# Patient Record
Sex: FEMALE | Race: WHITE | Employment: OTHER | URBAN - METROPOLITAN AREA
[De-identification: names, ages, dates, MRNs, and addresses within clinical notes are randomized per-mention and may not be internally consistent; named-entity substitution may affect disease eponyms.]

---

## 2017-02-20 PROBLEM — G47.33 OSA ON CPAP: Status: ACTIVE | Noted: 2017-02-20

## 2017-02-20 PROBLEM — Z99.89 OSA ON CPAP: Status: ACTIVE | Noted: 2017-02-20

## 2017-06-13 ENCOUNTER — HOSPITAL ENCOUNTER (OUTPATIENT)
Dept: LAB | Age: 67
Discharge: HOME | End: 2017-06-13
Attending: FAMILY MEDICINE | Admitting: FAMILY MEDICINE

## 2017-06-13 LAB
ALBUMIN SERPL-MCNC: 4.3 GM/DL (ref 3.4–5)
ALP BLD-CCNC: 100 IU/L (ref 40–129)
ALT SERPL-CCNC: 17 U/L (ref 10–40)
ANION GAP SERPL CALCULATED.3IONS-SCNC: 14 MMOL/L (ref 4–16)
AST SERPL-CCNC: 12 IU/L (ref 15–37)
BASOPHILS ABSOLUTE: 0 K/CU MM
BASOPHILS RELATIVE PERCENT: 0.4 % (ref 0–1)
BILIRUB SERPL-MCNC: 0.3 MG/DL (ref 0–1)
BUN BLDV-MCNC: 13 MG/DL (ref 6–23)
CALCIUM SERPL-MCNC: 9.6 MG/DL (ref 8.3–10.6)
CHLORIDE BLD-SCNC: 99 MMOL/L (ref 99–110)
CO2: 26 MMOL/L (ref 21–32)
CREAT SERPL-MCNC: 0.6 MG/DL (ref 0.6–1.1)
DIFFERENTIAL TYPE: ABNORMAL
EOSINOPHILS ABSOLUTE: 0.2 K/CU MM
EOSINOPHILS RELATIVE PERCENT: 2.9 % (ref 0–3)
GFR AFRICAN AMERICAN: >60 ML/MIN/1.73M2
GFR NON-AFRICAN AMERICAN: >60 ML/MIN/1.73M2
GLUCOSE FASTING: 124 MG/DL (ref 70–99)
HCT VFR BLD CALC: 39.9 % (ref 37–47)
HEMOGLOBIN: 12.6 GM/DL (ref 12.5–16)
IMMATURE NEUTROPHIL %: 0.3 % (ref 0–0.43)
LYMPHOCYTES ABSOLUTE: 2 K/CU MM
LYMPHOCYTES RELATIVE PERCENT: 27.9 % (ref 24–44)
MCH RBC QN AUTO: 29.4 PG (ref 27–31)
MCHC RBC AUTO-ENTMCNC: 31.6 % (ref 32–36)
MCV RBC AUTO: 93 FL (ref 78–100)
MONOCYTES ABSOLUTE: 0.5 K/CU MM
MONOCYTES RELATIVE PERCENT: 6.3 % (ref 0–4)
NUCLEATED RBC %: 0 %
PDW BLD-RTO: 12.7 % (ref 11.7–14.9)
PLATELET # BLD: 214 K/CU MM (ref 140–440)
PMV BLD AUTO: 9.3 FL (ref 7.5–11.1)
POTASSIUM SERPL-SCNC: 4.6 MMOL/L (ref 3.5–5.1)
RBC # BLD: 4.29 M/CU MM (ref 4.2–5.4)
SEGMENTED NEUTROPHILS ABSOLUTE COUNT: 4.4 K/CU MM
SEGMENTED NEUTROPHILS RELATIVE PERCENT: 62.2 % (ref 36–66)
SODIUM BLD-SCNC: 139 MMOL/L (ref 135–145)
T3 FREE: 2.8 PG/ML (ref 2.3–4.2)
T4 FREE: 0.86 NG/DL (ref 0.9–1.8)
TOTAL IMMATURE NEUTOROPHIL: 0.02 K/CU MM
TOTAL NUCLEATED RBC: 0 K/CU MM
TOTAL PROTEIN: 6.9 GM/DL (ref 6.4–8.2)
TSH HIGH SENSITIVITY: 1.71 UIU/ML (ref 0.27–4.2)
WBC # BLD: 7.1 K/CU MM (ref 4–10.5)

## 2017-08-11 ENCOUNTER — HOSPITAL ENCOUNTER (OUTPATIENT)
Dept: LAB | Age: 67
Discharge: HOME | End: 2017-08-11
Attending: FAMILY MEDICINE | Admitting: FAMILY MEDICINE

## 2017-08-11 LAB
T4 FREE: 0.92 NG/DL (ref 0.9–1.8)
TSH HIGH SENSITIVITY: 1.48 UIU/ML (ref 0.27–4.2)

## 2017-10-12 ENCOUNTER — HOSPITAL ENCOUNTER (OUTPATIENT)
Dept: LAB | Age: 67
Discharge: HOME | End: 2017-10-12
Attending: FAMILY MEDICINE | Admitting: FAMILY MEDICINE

## 2017-10-12 LAB
ALBUMIN SERPL-MCNC: 4.5 GM/DL (ref 3.4–5)
ALP BLD-CCNC: 107 IU/L (ref 40–128)
ALT SERPL-CCNC: 14 U/L (ref 10–40)
ANION GAP SERPL CALCULATED.3IONS-SCNC: 16 MMOL/L (ref 4–16)
AST SERPL-CCNC: 14 IU/L (ref 15–37)
BACTERIA: ABNORMAL /HPF
BILIRUB SERPL-MCNC: 0.3 MG/DL (ref 0–1)
BILIRUBIN URINE: NEGATIVE MG/DL
BLOOD, URINE: NEGATIVE
BUN BLDV-MCNC: 11 MG/DL (ref 6–23)
CALCIUM SERPL-MCNC: 9.6 MG/DL (ref 8.3–10.6)
CHLORIDE BLD-SCNC: 97 MMOL/L (ref 99–110)
CHOLESTEROL: 197 MG/DL
CLARITY: ABNORMAL
CO2: 26 MMOL/L (ref 21–32)
COLOR: YELLOW
CREAT SERPL-MCNC: 0.6 MG/DL (ref 0.6–1.1)
ESTIMATED AVERAGE GLUCOSE: 120 MG/DL
GFR AFRICAN AMERICAN: >60 ML/MIN/1.73M2
GFR NON-AFRICAN AMERICAN: >60 ML/MIN/1.73M2
GLUCOSE FASTING: 129 MG/DL (ref 70–99)
GLUCOSE, URINE: NEGATIVE MG/DL
HBA1C MFR BLD: 5.8 % (ref 4.2–6.3)
HDLC SERPL-MCNC: 51 MG/DL
KETONES, URINE: NEGATIVE MG/DL
LDL CHOLESTEROL DIRECT: 119 MG/DL
LEUKOCYTE ESTERASE, URINE: NEGATIVE
MUCUS: ABNORMAL HPF
NITRITE URINE, QUANTITATIVE: NEGATIVE
PH, URINE: 7 (ref 5–8)
POTASSIUM SERPL-SCNC: 4.3 MMOL/L (ref 3.5–5.1)
PROTEIN UA: NEGATIVE MG/DL
RBC URINE: 1 /HPF (ref 0–6)
SODIUM BLD-SCNC: 139 MMOL/L (ref 135–145)
SPECIFIC GRAVITY UA: 1.01 (ref 1–1.03)
SQUAMOUS EPITHELIAL: 26 /HPF
TOTAL PROTEIN: 7 GM/DL (ref 6.4–8.2)
TRICHOMONAS: ABNORMAL /HPF
TRIGL SERPL-MCNC: 235 MG/DL
UROBILINOGEN, URINE: NORMAL MG/DL (ref 0.2–1)
WBC UA: 1 /HPF (ref 0–5)

## 2018-01-15 ENCOUNTER — HOSPITAL ENCOUNTER (OUTPATIENT)
Dept: LAB | Age: 68
Discharge: HOME | End: 2018-01-15
Attending: FAMILY MEDICINE | Admitting: FAMILY MEDICINE

## 2018-01-15 LAB
CHOLESTEROL: 188 MG/DL
ESTIMATED AVERAGE GLUCOSE: 123 MG/DL
HBA1C MFR BLD: 5.9 % (ref 4.2–6.3)
HDLC SERPL-MCNC: 49 MG/DL
HEPATITIS C ANTIBODY: NON REACTIVE
LDL CHOLESTEROL DIRECT: 107 MG/DL
TRIGL SERPL-MCNC: 252 MG/DL

## 2018-01-23 ENCOUNTER — HOSPITAL ENCOUNTER (OUTPATIENT)
Dept: WOMENS IMAGING | Age: 68
Discharge: HOME | End: 2018-02-21
Attending: FAMILY MEDICINE | Admitting: FAMILY MEDICINE

## 2018-02-07 ENCOUNTER — HOSPITAL ENCOUNTER (OUTPATIENT)
Dept: WOMENS IMAGING | Age: 68
Discharge: HOME | End: 2018-03-08
Attending: FAMILY MEDICINE | Admitting: FAMILY MEDICINE

## 2018-02-15 ENCOUNTER — HOSPITAL ENCOUNTER (OUTPATIENT)
Dept: WOMENS IMAGING | Age: 68
Discharge: HOME | End: 2018-02-15
Attending: FAMILY MEDICINE | Admitting: FAMILY MEDICINE

## 2018-02-15 DIAGNOSIS — Z12.31 VISIT FOR SCREENING MAMMOGRAM: ICD-10-CM

## 2018-07-02 ENCOUNTER — HOSPITAL ENCOUNTER (OUTPATIENT)
Dept: LAB | Age: 68
Discharge: HOME | End: 2018-07-02
Attending: FAMILY MEDICINE | Admitting: FAMILY MEDICINE

## 2018-07-02 LAB
ALBUMIN SERPL-MCNC: 4.4 GM/DL (ref 3.4–5)
ALP BLD-CCNC: 99 IU/L (ref 40–128)
ALT SERPL-CCNC: 21 U/L (ref 10–40)
ANION GAP SERPL CALCULATED.3IONS-SCNC: 20 MMOL/L (ref 4–16)
AST SERPL-CCNC: 23 IU/L (ref 15–37)
BACTERIA: ABNORMAL /HPF
BASOPHILS ABSOLUTE: 0 K/CU MM
BASOPHILS RELATIVE PERCENT: 0.4 % (ref 0–1)
BILIRUB SERPL-MCNC: 0.3 MG/DL (ref 0–1)
BILIRUBIN URINE: NEGATIVE MG/DL
BLOOD, URINE: NEGATIVE
BUN BLDV-MCNC: 11 MG/DL (ref 6–23)
CALCIUM SERPL-MCNC: 9.4 MG/DL (ref 8.3–10.6)
CHLORIDE BLD-SCNC: 97 MMOL/L (ref 99–110)
CHOLESTEROL: 186 MG/DL
CLARITY: ABNORMAL
CO2: 22 MMOL/L (ref 21–32)
COLOR: YELLOW
CREAT SERPL-MCNC: 0.6 MG/DL (ref 0.6–1.1)
DIFFERENTIAL TYPE: ABNORMAL
EOSINOPHILS ABSOLUTE: 0.2 K/CU MM
EOSINOPHILS RELATIVE PERCENT: 3 % (ref 0–3)
ESTIMATED AVERAGE GLUCOSE: 148 MG/DL
GFR AFRICAN AMERICAN: >60 ML/MIN/1.73M2
GFR NON-AFRICAN AMERICAN: >60 ML/MIN/1.73M2
GLUCOSE BLD-MCNC: 125 MG/DL (ref 70–99)
GLUCOSE, URINE: NEGATIVE MG/DL
HBA1C MFR BLD: 6.8 % (ref 4.2–6.3)
HCT VFR BLD CALC: 42 % (ref 37–47)
HDLC SERPL-MCNC: 57 MG/DL
HEMOGLOBIN: 12.7 GM/DL (ref 12.5–16)
IMMATURE NEUTROPHIL %: 0.4 % (ref 0–0.43)
KETONES, URINE: NEGATIVE MG/DL
LDL CHOLESTEROL DIRECT: 126 MG/DL
LEUKOCYTE ESTERASE, URINE: ABNORMAL
LYMPHOCYTES ABSOLUTE: 2.1 K/CU MM
LYMPHOCYTES RELATIVE PERCENT: 26.8 % (ref 24–44)
MCH RBC QN AUTO: 28.2 PG (ref 27–31)
MCHC RBC AUTO-ENTMCNC: 30.2 % (ref 32–36)
MCV RBC AUTO: 93.1 FL (ref 78–100)
MONOCYTES ABSOLUTE: 0.5 K/CU MM
MONOCYTES RELATIVE PERCENT: 6 % (ref 0–4)
MUCUS: ABNORMAL HPF
NITRITE URINE, QUANTITATIVE: NEGATIVE
NUCLEATED RBC %: 0 %
PDW BLD-RTO: 13.5 % (ref 11.7–14.9)
PH, URINE: 6 (ref 5–8)
PLATELET # BLD: 240 K/CU MM (ref 140–440)
PMV BLD AUTO: 8.8 FL (ref 7.5–11.1)
POTASSIUM SERPL-SCNC: 3.9 MMOL/L (ref 3.5–5.1)
PROTEIN UA: NEGATIVE MG/DL
RBC # BLD: 4.51 M/CU MM (ref 4.2–5.4)
RBC URINE: <1 /HPF (ref 0–6)
SEGMENTED NEUTROPHILS ABSOLUTE COUNT: 4.9 K/CU MM
SEGMENTED NEUTROPHILS RELATIVE PERCENT: 63.4 % (ref 36–66)
SODIUM BLD-SCNC: 139 MMOL/L (ref 135–145)
SPECIFIC GRAVITY UA: 1.01 (ref 1–1.03)
SQUAMOUS EPITHELIAL: 6 /HPF
TOTAL IMMATURE NEUTOROPHIL: 0.03 K/CU MM
TOTAL NUCLEATED RBC: 0 K/CU MM
TOTAL PROTEIN: 6.9 GM/DL (ref 6.4–8.2)
TRANSITIONAL EPITHELIAL: <1 /HPF
TRICHOMONAS: ABNORMAL /HPF
TRIGL SERPL-MCNC: 218 MG/DL
UROBILINOGEN, URINE: NORMAL MG/DL (ref 0.2–1)
WBC # BLD: 7.8 K/CU MM (ref 4–10.5)
WBC UA: 9 /HPF (ref 0–5)

## 2018-09-23 ENCOUNTER — HOSPITAL ENCOUNTER (OUTPATIENT)
Age: 68
Discharge: HOME | End: 2018-09-23
Payer: COMMERCIAL

## 2018-09-23 LAB
ESTIMATED AVERAGE GLUCOSE: 128 MG/DL
HBA1C MFR BLD: 6.1 % (ref 4.2–6.3)

## 2018-09-23 PROCEDURE — 83036 HEMOGLOBIN GLYCOSYLATED A1C: CPT

## 2018-09-23 PROCEDURE — 36415 COLL VENOUS BLD VENIPUNCTURE: CPT

## 2019-01-16 ENCOUNTER — HOSPITAL ENCOUNTER (OUTPATIENT)
Age: 69
Discharge: HOME | End: 2019-01-16
Payer: COMMERCIAL

## 2019-01-16 LAB
ANION GAP SERPL CALCULATED.3IONS-SCNC: 13 MMOL/L (ref 4–16)
BUN BLDV-MCNC: 11 MG/DL (ref 6–23)
CALCIUM SERPL-MCNC: 9.4 MG/DL (ref 8.3–10.6)
CHLORIDE BLD-SCNC: 100 MMOL/L (ref 99–110)
CO2: 27 MMOL/L (ref 21–32)
CREAT SERPL-MCNC: 0.6 MG/DL (ref 0.6–1.1)
ESTIMATED AVERAGE GLUCOSE: 128 MG/DL
GFR AFRICAN AMERICAN: >60 ML/MIN/1.73M2
GFR NON-AFRICAN AMERICAN: >60 ML/MIN/1.73M2
GLUCOSE BLD-MCNC: 132 MG/DL (ref 70–99)
HBA1C MFR BLD: 6.1 % (ref 4.2–6.3)
POTASSIUM SERPL-SCNC: 4.6 MMOL/L (ref 3.5–5.1)
SODIUM BLD-SCNC: 140 MMOL/L (ref 135–145)

## 2019-01-16 PROCEDURE — 80048 BASIC METABOLIC PNL TOTAL CA: CPT

## 2019-01-16 PROCEDURE — 36415 COLL VENOUS BLD VENIPUNCTURE: CPT

## 2019-01-16 PROCEDURE — 83036 HEMOGLOBIN GLYCOSYLATED A1C: CPT

## 2019-01-30 ENCOUNTER — OFFICE VISIT (OUTPATIENT)
Dept: INTERNAL MEDICINE CLINIC | Age: 69
End: 2019-01-30
Payer: COMMERCIAL

## 2019-01-30 VITALS
WEIGHT: 234.6 LBS | BODY MASS INDEX: 46.06 KG/M2 | DIASTOLIC BLOOD PRESSURE: 64 MMHG | HEART RATE: 86 BPM | OXYGEN SATURATION: 98 % | HEIGHT: 60 IN | SYSTOLIC BLOOD PRESSURE: 116 MMHG

## 2019-01-30 DIAGNOSIS — J01.90 ACUTE NON-RECURRENT SINUSITIS, UNSPECIFIED LOCATION: Primary | ICD-10-CM

## 2019-01-30 DIAGNOSIS — F32.A DEPRESSION, UNSPECIFIED DEPRESSION TYPE: ICD-10-CM

## 2019-01-30 DIAGNOSIS — R73.03 PREDIABETES: ICD-10-CM

## 2019-01-30 DIAGNOSIS — E78.5 HYPERLIPIDEMIA, UNSPECIFIED HYPERLIPIDEMIA TYPE: ICD-10-CM

## 2019-01-30 PROCEDURE — 99213 OFFICE O/P EST LOW 20 MIN: CPT | Performed by: FAMILY MEDICINE

## 2019-01-30 RX ORDER — AMOXICILLIN AND CLAVULANATE POTASSIUM 875; 125 MG/1; MG/1
1 TABLET, FILM COATED ORAL 2 TIMES DAILY
Qty: 20 TABLET | Refills: 0 | Status: SHIPPED | OUTPATIENT
Start: 2019-01-30 | End: 2019-02-09

## 2019-01-30 RX ORDER — LOSARTAN POTASSIUM AND HYDROCHLOROTHIAZIDE 12.5; 5 MG/1; MG/1
TABLET ORAL
COMMUNITY
Start: 2018-12-30

## 2019-01-30 RX ORDER — ESCITALOPRAM OXALATE 10 MG/1
10 TABLET ORAL DAILY
Qty: 90 TABLET | Refills: 1 | Status: SHIPPED | OUTPATIENT
Start: 2019-01-30 | End: 2019-05-29 | Stop reason: SDUPTHER

## 2019-01-30 ASSESSMENT — ENCOUNTER SYMPTOMS
RHINORRHEA: 1
ABDOMINAL PAIN: 0
SHORTNESS OF BREATH: 0
EYES NEGATIVE: 1
SINUS PAIN: 1
NAUSEA: 0
WHEEZING: 0

## 2019-01-30 ASSESSMENT — PATIENT HEALTH QUESTIONNAIRE - PHQ9
1. LITTLE INTEREST OR PLEASURE IN DOING THINGS: 0
SUM OF ALL RESPONSES TO PHQ QUESTIONS 1-9: 0
SUM OF ALL RESPONSES TO PHQ9 QUESTIONS 1 & 2: 0
2. FEELING DOWN, DEPRESSED OR HOPELESS: 0
SUM OF ALL RESPONSES TO PHQ QUESTIONS 1-9: 0

## 2019-03-14 ENCOUNTER — TELEPHONE (OUTPATIENT)
Dept: INTERNAL MEDICINE CLINIC | Age: 69
End: 2019-03-14

## 2019-04-04 RX ORDER — SIMVASTATIN 20 MG
20 TABLET ORAL NIGHTLY
Qty: 90 TABLET | Refills: 0 | Status: SHIPPED | OUTPATIENT
Start: 2019-04-04 | End: 2019-06-29 | Stop reason: SDUPTHER

## 2019-05-29 RX ORDER — ESCITALOPRAM OXALATE 10 MG/1
10 TABLET ORAL DAILY
Qty: 90 TABLET | Refills: 0 | Status: SHIPPED | OUTPATIENT
Start: 2019-05-29 | End: 2019-07-30 | Stop reason: SDUPTHER

## 2019-07-01 RX ORDER — SIMVASTATIN 20 MG
TABLET ORAL
Qty: 90 TABLET | Refills: 0 | Status: SHIPPED | OUTPATIENT
Start: 2019-07-01 | End: 2019-09-27 | Stop reason: SDUPTHER

## 2019-07-22 ENCOUNTER — HOSPITAL ENCOUNTER (OUTPATIENT)
Age: 69
Discharge: HOME | End: 2019-07-22
Payer: COMMERCIAL

## 2019-07-22 DIAGNOSIS — R73.03 PREDIABETES: ICD-10-CM

## 2019-07-22 DIAGNOSIS — E78.5 HYPERLIPIDEMIA, UNSPECIFIED HYPERLIPIDEMIA TYPE: ICD-10-CM

## 2019-07-22 LAB
ALBUMIN SERPL-MCNC: 4.6 GM/DL (ref 3.4–5)
ALP BLD-CCNC: 111 IU/L (ref 40–129)
ALT SERPL-CCNC: 18 U/L (ref 10–40)
AST SERPL-CCNC: 16 IU/L (ref 15–37)
BILIRUB SERPL-MCNC: 0.3 MG/DL (ref 0–1)
BILIRUBIN DIRECT: 0.2 MG/DL (ref 0–0.3)
BILIRUBIN, INDIRECT: 0.1 MG/DL (ref 0–0.7)
CHOLESTEROL: 214 MG/DL
ESTIMATED AVERAGE GLUCOSE: 134 MG/DL
HBA1C MFR BLD: 6.3 % (ref 4.2–6.3)
HDLC SERPL-MCNC: 53 MG/DL
LDL CHOLESTEROL DIRECT: 141 MG/DL
TOTAL PROTEIN: 7.2 GM/DL (ref 6.4–8.2)
TRIGL SERPL-MCNC: 215 MG/DL

## 2019-07-22 PROCEDURE — 83721 ASSAY OF BLOOD LIPOPROTEIN: CPT

## 2019-07-22 PROCEDURE — 80076 HEPATIC FUNCTION PANEL: CPT

## 2019-07-22 PROCEDURE — 80061 LIPID PANEL: CPT

## 2019-07-22 PROCEDURE — 36415 COLL VENOUS BLD VENIPUNCTURE: CPT

## 2019-07-22 PROCEDURE — 83036 HEMOGLOBIN GLYCOSYLATED A1C: CPT

## 2019-07-30 ENCOUNTER — OFFICE VISIT (OUTPATIENT)
Dept: INTERNAL MEDICINE CLINIC | Age: 69
End: 2019-07-30
Payer: COMMERCIAL

## 2019-07-30 VITALS
OXYGEN SATURATION: 98 % | HEIGHT: 60 IN | SYSTOLIC BLOOD PRESSURE: 110 MMHG | WEIGHT: 237.8 LBS | BODY MASS INDEX: 46.68 KG/M2 | DIASTOLIC BLOOD PRESSURE: 78 MMHG | HEART RATE: 64 BPM

## 2019-07-30 DIAGNOSIS — N95.0 PMB (POSTMENOPAUSAL BLEEDING): ICD-10-CM

## 2019-07-30 DIAGNOSIS — R73.03 PREDIABETES: Primary | ICD-10-CM

## 2019-07-30 DIAGNOSIS — E78.5 HYPERLIPIDEMIA, UNSPECIFIED HYPERLIPIDEMIA TYPE: ICD-10-CM

## 2019-07-30 DIAGNOSIS — F32.A DEPRESSION, UNSPECIFIED DEPRESSION TYPE: ICD-10-CM

## 2019-07-30 PROCEDURE — 99214 OFFICE O/P EST MOD 30 MIN: CPT | Performed by: FAMILY MEDICINE

## 2019-07-30 RX ORDER — ESCITALOPRAM OXALATE 10 MG/1
10 TABLET ORAL DAILY
Qty: 90 TABLET | Refills: 0 | Status: SHIPPED | OUTPATIENT
Start: 2019-07-30 | End: 2019-12-03 | Stop reason: SDUPTHER

## 2019-08-03 ASSESSMENT — ENCOUNTER SYMPTOMS
CONSTIPATION: 0
DIARRHEA: 0
CHEST TIGHTNESS: 0
COUGH: 0
BACK PAIN: 0
BLOOD IN STOOL: 0
NAUSEA: 0
SHORTNESS OF BREATH: 0
ABDOMINAL PAIN: 0

## 2019-09-27 RX ORDER — SIMVASTATIN 20 MG
TABLET ORAL
Qty: 90 TABLET | Refills: 0 | Status: SHIPPED | OUTPATIENT
Start: 2019-09-27 | End: 2019-12-03 | Stop reason: SDUPTHER

## 2019-10-25 ENCOUNTER — HOSPITAL ENCOUNTER (OUTPATIENT)
Age: 69
Discharge: HOME | End: 2019-10-25
Payer: MEDICARE

## 2019-10-25 ENCOUNTER — HOSPITAL ENCOUNTER (OUTPATIENT)
Dept: GENERAL RADIOLOGY | Age: 69
Discharge: HOME | End: 2019-10-25
Payer: MEDICARE

## 2019-10-25 DIAGNOSIS — J43.2 CENTRILOBULAR EMPHYSEMA (HCC): ICD-10-CM

## 2019-10-25 PROCEDURE — 71046 X-RAY EXAM CHEST 2 VIEWS: CPT

## 2019-11-15 ENCOUNTER — TELEPHONE (OUTPATIENT)
Dept: INTERNAL MEDICINE CLINIC | Age: 69
End: 2019-11-15

## 2019-11-29 ENCOUNTER — HOSPITAL ENCOUNTER (OUTPATIENT)
Age: 69
Discharge: HOME | End: 2019-11-29
Payer: MEDICARE

## 2019-11-29 LAB
ESTIMATED AVERAGE GLUCOSE: 137 MG/DL
HBA1C MFR BLD: 6.4 % (ref 4.2–6.3)

## 2019-11-29 PROCEDURE — 83036 HEMOGLOBIN GLYCOSYLATED A1C: CPT

## 2019-11-29 PROCEDURE — 36415 COLL VENOUS BLD VENIPUNCTURE: CPT

## 2019-12-03 ENCOUNTER — OFFICE VISIT (OUTPATIENT)
Dept: INTERNAL MEDICINE CLINIC | Age: 69
End: 2019-12-03
Payer: MEDICARE

## 2019-12-03 VITALS
HEIGHT: 60 IN | WEIGHT: 235.6 LBS | DIASTOLIC BLOOD PRESSURE: 84 MMHG | OXYGEN SATURATION: 98 % | SYSTOLIC BLOOD PRESSURE: 135 MMHG | TEMPERATURE: 97.6 F | RESPIRATION RATE: 20 BRPM | HEART RATE: 80 BPM | BODY MASS INDEX: 46.26 KG/M2

## 2019-12-03 DIAGNOSIS — R73.03 PREDIABETES: Primary | ICD-10-CM

## 2019-12-03 DIAGNOSIS — E78.5 HYPERLIPIDEMIA, UNSPECIFIED HYPERLIPIDEMIA TYPE: ICD-10-CM

## 2019-12-03 DIAGNOSIS — F32.A DEPRESSION, UNSPECIFIED DEPRESSION TYPE: ICD-10-CM

## 2019-12-03 DIAGNOSIS — J20.9 ACUTE BRONCHITIS, UNSPECIFIED ORGANISM: ICD-10-CM

## 2019-12-03 DIAGNOSIS — B96.89 ACUTE BACTERIAL SINUSITIS: ICD-10-CM

## 2019-12-03 DIAGNOSIS — J01.90 ACUTE BACTERIAL SINUSITIS: ICD-10-CM

## 2019-12-03 DIAGNOSIS — N95.0 PMB (POSTMENOPAUSAL BLEEDING): ICD-10-CM

## 2019-12-03 PROCEDURE — 99214 OFFICE O/P EST MOD 30 MIN: CPT | Performed by: FAMILY MEDICINE

## 2019-12-03 RX ORDER — SIMVASTATIN 20 MG
20 TABLET ORAL NIGHTLY
Qty: 90 TABLET | Refills: 0 | Status: SHIPPED | OUTPATIENT
Start: 2019-12-03 | End: 2020-02-28

## 2019-12-03 RX ORDER — PREDNISONE 10 MG/1
10 TABLET ORAL 2 TIMES DAILY
Qty: 10 TABLET | Refills: 0 | Status: SHIPPED | OUTPATIENT
Start: 2019-12-03 | End: 2019-12-08

## 2019-12-03 RX ORDER — CEFDINIR 300 MG/1
CAPSULE ORAL
Refills: 0 | COMMUNITY
Start: 2019-11-25 | End: 2020-03-10

## 2019-12-03 RX ORDER — ESCITALOPRAM OXALATE 10 MG/1
10 TABLET ORAL DAILY
Qty: 90 TABLET | Refills: 0 | Status: SHIPPED | OUTPATIENT
Start: 2019-12-03 | End: 2020-03-10 | Stop reason: SDUPTHER

## 2019-12-03 RX ORDER — FLUTICASONE PROPIONATE 50 MCG
SPRAY, SUSPENSION (ML) NASAL
Refills: 0 | COMMUNITY
Start: 2019-11-25 | End: 2022-07-14

## 2019-12-05 ENCOUNTER — HOSPITAL ENCOUNTER (OUTPATIENT)
Dept: ULTRASOUND IMAGING | Age: 69
Discharge: HOME | End: 2019-12-05
Payer: MEDICARE

## 2019-12-05 DIAGNOSIS — N95.0 PMB (POSTMENOPAUSAL BLEEDING): ICD-10-CM

## 2019-12-05 PROCEDURE — 76856 US EXAM PELVIC COMPLETE: CPT

## 2019-12-08 ASSESSMENT — ENCOUNTER SYMPTOMS
DIARRHEA: 0
BLOOD IN STOOL: 0
RHINORRHEA: 1
SORE THROAT: 0
SHORTNESS OF BREATH: 0
BACK PAIN: 0
CHEST TIGHTNESS: 0
CONSTIPATION: 0
COUGH: 0
NAUSEA: 0
ABDOMINAL PAIN: 0

## 2019-12-11 ENCOUNTER — TELEPHONE (OUTPATIENT)
Dept: INTERNAL MEDICINE CLINIC | Age: 69
End: 2019-12-11

## 2019-12-11 DIAGNOSIS — N95.0 PMB (POSTMENOPAUSAL BLEEDING): Primary | ICD-10-CM

## 2020-02-12 ENCOUNTER — TELEPHONE (OUTPATIENT)
Dept: INTERNAL MEDICINE CLINIC | Age: 70
End: 2020-02-12

## 2020-02-12 NOTE — TELEPHONE ENCOUNTER
Per call from [address]: they have been unable to reach patient, she has not returned their messages.

## 2020-02-13 ENCOUNTER — TELEPHONE (OUTPATIENT)
Dept: INTERNAL MEDICINE CLINIC | Age: 70
End: 2020-02-13

## 2020-02-13 NOTE — TELEPHONE ENCOUNTER
[facility] called in stating that they received a referral and they do not take patients insurance so she will need to be referred somewhere else.

## 2020-02-28 RX ORDER — SIMVASTATIN 20 MG
TABLET ORAL
Qty: 90 TABLET | Refills: 0 | Status: SHIPPED | OUTPATIENT
Start: 2020-02-28 | End: 2020-05-29

## 2020-03-06 ENCOUNTER — HOSPITAL ENCOUNTER (OUTPATIENT)
Age: 70
Discharge: HOME | End: 2020-03-06
Payer: MEDICARE

## 2020-03-06 LAB
ESTIMATED AVERAGE GLUCOSE: 140 MG/DL
HBA1C MFR BLD: 6.5 % (ref 4.2–6.3)

## 2020-03-06 PROCEDURE — 83036 HEMOGLOBIN GLYCOSYLATED A1C: CPT

## 2020-03-06 PROCEDURE — 36415 COLL VENOUS BLD VENIPUNCTURE: CPT

## 2020-03-10 ENCOUNTER — TELEPHONE (OUTPATIENT)
Dept: INTERNAL MEDICINE CLINIC | Age: 70
End: 2020-03-10

## 2020-03-10 ENCOUNTER — OFFICE VISIT (OUTPATIENT)
Dept: INTERNAL MEDICINE CLINIC | Age: 70
End: 2020-03-10
Payer: MEDICARE

## 2020-03-10 VITALS
DIASTOLIC BLOOD PRESSURE: 82 MMHG | BODY MASS INDEX: 45.43 KG/M2 | HEIGHT: 60 IN | WEIGHT: 231.4 LBS | HEART RATE: 69 BPM | OXYGEN SATURATION: 98 % | SYSTOLIC BLOOD PRESSURE: 128 MMHG

## 2020-03-10 PROCEDURE — 99214 OFFICE O/P EST MOD 30 MIN: CPT | Performed by: FAMILY MEDICINE

## 2020-03-10 RX ORDER — DIPHENHYDRAMINE HYDROCHLORIDE 50 MG/30ML
LIQUID ORAL
COMMUNITY
End: 2022-07-14

## 2020-03-10 RX ORDER — ESCITALOPRAM OXALATE 10 MG/1
10 TABLET ORAL DAILY
Qty: 90 TABLET | Refills: 1 | Status: SHIPPED | OUTPATIENT
Start: 2020-03-10 | End: 2020-09-14 | Stop reason: SDUPTHER

## 2020-03-10 ASSESSMENT — ENCOUNTER SYMPTOMS
DIARRHEA: 0
BACK PAIN: 0
BLOOD IN STOOL: 0
ABDOMINAL PAIN: 0
NAUSEA: 0
COUGH: 0
CONSTIPATION: 0
CHEST TIGHTNESS: 0
SHORTNESS OF BREATH: 0

## 2020-03-10 ASSESSMENT — PATIENT HEALTH QUESTIONNAIRE - PHQ9
SUM OF ALL RESPONSES TO PHQ9 QUESTIONS 1 & 2: 0
1. LITTLE INTEREST OR PLEASURE IN DOING THINGS: 1
SUM OF ALL RESPONSES TO PHQ QUESTIONS 1-9: 0
SUM OF ALL RESPONSES TO PHQ9 QUESTIONS 1 & 2: 2
2. FEELING DOWN, DEPRESSED OR HOPELESS: 0
SUM OF ALL RESPONSES TO PHQ QUESTIONS 1-9: 0
SUM OF ALL RESPONSES TO PHQ QUESTIONS 1-9: 2
SUM OF ALL RESPONSES TO PHQ QUESTIONS 1-9: 2
1. LITTLE INTEREST OR PLEASURE IN DOING THINGS: 0
2. FEELING DOWN, DEPRESSED OR HOPELESS: 1

## 2020-03-10 NOTE — TELEPHONE ENCOUNTER
Called to let you know she called Dr Flakita Nguyen office and after giving them some information they said they will call her back and let her know if he can see her.

## 2020-03-10 NOTE — PROGRESS NOTES
Comfort Carter  1950  65 y.o.  female    Chief Complaint   Patient presents with    3 Month Follow-Up         History of Present Illness  Comfort Carter is a 65 y.o. female who presents today for a check up. Last labs reviewed. No Cp or Sob. Pt still has not followed with Gyn for her PMB. She is aware of the risks, but states her sister  of lung cancer. She states she tried to get a Gyn-female in [place] but many do not take her insurance. -DM II- Recent Hba1c 6.5. Pt to change diet and increase exercise  -Depression- On Lexapro. No SI or plan  -HLD- Mixed, , HDL 53, . On Zocor 20  -HTN-Stable on Hyzaar and Metoprolol  -Cardiomyopathy- managed by cardiology      Review of Systems   Constitutional: Negative for diaphoresis and fever. Respiratory: Negative for cough, chest tightness and shortness of breath. Cardiovascular: Negative for chest pain and palpitations. Gastrointestinal: Negative for abdominal pain, blood in stool, constipation, diarrhea and nausea. Genitourinary: Positive for vaginal bleeding (spotting). Negative for difficulty urinating, dysuria, pelvic pain and vaginal discharge. Musculoskeletal: Negative for back pain. Neurological: Negative for dizziness, light-headedness and headaches. Psychiatric/Behavioral: Positive for dysphoric mood. Negative for suicidal ideas.        Allergies   Allergen Reactions    Latex Rash    Norvasc [Amlodipine Besylate]     Ace Inhibitors      Cough    Soybean-Containing Drug Products        Past Medical History:   Diagnosis Date    Arthritis     Right Hip    Asthma     Cardiomyopathy, nonischemic ([address]     hypertensive type    COPD (chronic obstructive pulmonary disease) ([facility] 2014    per PFT 2014    Edema     Essential hypertension     History of blood transfusion     Hyperlipidemia     Impaired fasting glucose     Major depressive disorder, single episode, unspecified     Mitral valve regurgitation     Obesity  Obstructive sleep apnea on CPAP     Osteopenia     Reflux esophagitis        Past Surgical History:   Procedure Laterality Date    CARDIAC CATHETERIZATION      X 2 - Dr. Radha Archibald; No interventions    CATARACT REMOVAL      COLONOSCOPY      COLONOSCOPY  10/20/2014    colon polyp, internal hemorrhoids    DILATION AND CURETTAGE OF UTERUS      X 2    EYE SURGERY  8years old   [address] [place] [address]  as a child       History reviewed. No pertinent family history. Social History     Tobacco Use    Smoking status: Never Smoker    Smokeless tobacco: Never Used   Substance Use Topics    Alcohol use: Yes     Alcohol/week: 0.0 standard drinks     Comment: Occassional    Drug use: No       Current Outpatient Medications   Medication Sig Dispense Refill    diphenhydrAMINE HCl (SLEEP AID) 50 MG/30ML LIQD Take by mouth      escitalopram (LEXAPRO) 10 MG tablet Take 1 tablet by mouth daily 90 tablet 1    simvastatin (ZOCOR) 20 MG tablet TAKE 1 TABLET BY MOUTH EVERY NIGHT 90 tablet 0    montelukast (SINGULAIR) 10 MG tablet TAKE 1 TABLET BY MOUTH EVERY NIGHT 90 tablet 3    fluticasone (FLONASE) 50 MCG/ACT nasal spray SHAKE LQ AND U 2 SPRAYS IEN QD  0    Glycopyrrolate (LONHALA MAGNAIR REFILL KIT) 25 MCG/ML SOLN Inhale 1 vial into the lungs 2 times daily 60 mL 1    albuterol sulfate HFA (PROAIR HFA) 108 (90 Base) MCG/ACT inhaler Inhale 2 puffs into the lungs every 6 hours as needed for Wheezing 1 Inhaler 3    losartan-hydrochlorothiazide (HYZAAR) 50-12.5 MG per tablet       acetaminophen (TYLENOL) 500 MG tablet Take 500 mg by mouth every 6 hours as needed for Pain      Homeopathic Products ([employer] LEG CRAMPS PAIN RELIEF PO) Take by mouth      metoprolol succinate (TOPROL XL) 25 MG extended release tablet TK 1 T PO QD  11    Nasal Wash SOLN by Nasal route as needed. No current facility-administered medications for this visit.         OBJECTIVE:    /82   Pulse 69   Ht 5' (1.524 m)   Wt 231 lb She plans to call Dr. Dia November or check with her insurance again and let me know. Pt aware of the risk of postponing this issue  The patient is asked to make an attempt to improve diet and exercise patterns to aid in medical management of this problem. Keep f/u with cardiology and other specialists           Return in about 4 months (around 7/10/2020) for Depression.     Electronically Signed by Roberto Gaytan, DO

## 2020-05-29 RX ORDER — SIMVASTATIN 20 MG
TABLET ORAL
Qty: 90 TABLET | Refills: 0 | Status: SHIPPED | OUTPATIENT
Start: 2020-05-29 | End: 2020-08-06 | Stop reason: SDUPTHER

## 2020-06-15 ENCOUNTER — HOSPITAL ENCOUNTER (OUTPATIENT)
Age: 70
Discharge: HOME | End: 2020-06-15
Payer: MEDICARE

## 2020-06-15 LAB
ALBUMIN SERPL-MCNC: 4.3 GM/DL (ref 3.4–5)
ALP BLD-CCNC: 104 IU/L (ref 40–128)
ALT SERPL-CCNC: 19 U/L (ref 10–40)
ANION GAP SERPL CALCULATED.3IONS-SCNC: 13 MMOL/L (ref 4–16)
AST SERPL-CCNC: 16 IU/L (ref 15–37)
BILIRUB SERPL-MCNC: 0.3 MG/DL (ref 0–1)
BUN BLDV-MCNC: 13 MG/DL (ref 6–23)
CALCIUM SERPL-MCNC: 9.3 MG/DL (ref 8.3–10.6)
CHLORIDE BLD-SCNC: 101 MMOL/L (ref 99–110)
CHOLESTEROL: 196 MG/DL
CO2: 26 MMOL/L (ref 21–32)
CREAT SERPL-MCNC: 0.6 MG/DL (ref 0.6–1.1)
ESTIMATED AVERAGE GLUCOSE: 140 MG/DL
GFR AFRICAN AMERICAN: >60 ML/MIN/1.73M2
GFR NON-AFRICAN AMERICAN: >60 ML/MIN/1.73M2
GLUCOSE BLD-MCNC: 125 MG/DL (ref 70–99)
HBA1C MFR BLD: 6.5 % (ref 4.2–6.3)
HDLC SERPL-MCNC: 56 MG/DL
LDL CHOLESTEROL DIRECT: 113 MG/DL
POTASSIUM SERPL-SCNC: 3.8 MMOL/L (ref 3.5–5.1)
SODIUM BLD-SCNC: 140 MMOL/L (ref 135–145)
TOTAL PROTEIN: 6.7 GM/DL (ref 6.4–8.2)
TRIGL SERPL-MCNC: 265 MG/DL

## 2020-06-15 PROCEDURE — 36415 COLL VENOUS BLD VENIPUNCTURE: CPT

## 2020-06-15 PROCEDURE — 80061 LIPID PANEL: CPT

## 2020-06-15 PROCEDURE — 83036 HEMOGLOBIN GLYCOSYLATED A1C: CPT

## 2020-06-15 PROCEDURE — 80053 COMPREHEN METABOLIC PANEL: CPT

## 2020-06-15 PROCEDURE — 83721 ASSAY OF BLOOD LIPOPROTEIN: CPT

## 2020-06-26 ENCOUNTER — HOSPITAL ENCOUNTER (OUTPATIENT)
Age: 70
Discharge: HOME | End: 2020-06-26
Payer: MEDICARE

## 2020-06-26 LAB
ANION GAP SERPL CALCULATED.3IONS-SCNC: 12 MMOL/L (ref 4–16)
BUN BLDV-MCNC: 11 MG/DL (ref 6–23)
CALCIUM SERPL-MCNC: 9.4 MG/DL (ref 8.3–10.6)
CHLORIDE BLD-SCNC: 97 MMOL/L (ref 99–110)
CO2: 27 MMOL/L (ref 21–32)
CREAT SERPL-MCNC: 0.6 MG/DL (ref 0.6–1.1)
GFR AFRICAN AMERICAN: >60 ML/MIN/1.73M2
GFR NON-AFRICAN AMERICAN: >60 ML/MIN/1.73M2
GLUCOSE BLD-MCNC: 152 MG/DL (ref 70–99)
POTASSIUM SERPL-SCNC: 3.7 MMOL/L (ref 3.5–5.1)
SODIUM BLD-SCNC: 136 MMOL/L (ref 135–145)

## 2020-06-26 PROCEDURE — 36415 COLL VENOUS BLD VENIPUNCTURE: CPT

## 2020-06-26 PROCEDURE — 80048 BASIC METABOLIC PNL TOTAL CA: CPT

## 2020-08-06 RX ORDER — SIMVASTATIN 20 MG
TABLET ORAL
Qty: 90 TABLET | Refills: 0 | Status: SHIPPED | OUTPATIENT
Start: 2020-08-06 | End: 2020-09-14 | Stop reason: SDUPTHER

## 2020-09-14 ENCOUNTER — VIRTUAL VISIT (OUTPATIENT)
Dept: INTERNAL MEDICINE CLINIC | Age: 70
End: 2020-09-14
Payer: MEDICARE

## 2020-09-14 ENCOUNTER — TELEMEDICINE (OUTPATIENT)
Dept: INTERNAL MEDICINE CLINIC | Age: 70
End: 2020-09-14
Payer: MEDICARE

## 2020-09-14 VITALS — HEIGHT: 60 IN | WEIGHT: 230 LBS | BODY MASS INDEX: 45.16 KG/M2

## 2020-09-14 PROBLEM — Z79.4 TYPE 2 DIABETES MELLITUS WITHOUT COMPLICATION, WITH LONG-TERM CURRENT USE OF INSULIN (HCC): Status: ACTIVE | Noted: 2020-09-14

## 2020-09-14 PROBLEM — E11.9 TYPE 2 DIABETES MELLITUS WITHOUT COMPLICATION, WITH LONG-TERM CURRENT USE OF INSULIN (HCC): Status: ACTIVE | Noted: 2020-09-14

## 2020-09-14 PROBLEM — F32.A DEPRESSION: Status: ACTIVE | Noted: 2020-09-14

## 2020-09-14 PROCEDURE — G0438 PPPS, INITIAL VISIT: HCPCS | Performed by: FAMILY MEDICINE

## 2020-09-14 PROCEDURE — 99214 OFFICE O/P EST MOD 30 MIN: CPT | Performed by: FAMILY MEDICINE

## 2020-09-14 RX ORDER — ESCITALOPRAM OXALATE 10 MG/1
10 TABLET ORAL DAILY
Qty: 90 TABLET | Refills: 1 | Status: SHIPPED | OUTPATIENT
Start: 2020-09-14 | End: 2021-06-08

## 2020-09-14 RX ORDER — SIMVASTATIN 20 MG
TABLET ORAL
Qty: 90 TABLET | Refills: 1 | Status: SHIPPED | OUTPATIENT
Start: 2020-09-14 | End: 2021-12-27 | Stop reason: CLARIF

## 2020-09-14 ASSESSMENT — ENCOUNTER SYMPTOMS
CONSTIPATION: 0
ABDOMINAL PAIN: 0
CHEST TIGHTNESS: 0
DIARRHEA: 0
BLOOD IN STOOL: 0
NAUSEA: 0
BACK PAIN: 0
COUGH: 0
SHORTNESS OF BREATH: 0

## 2020-09-14 ASSESSMENT — PATIENT HEALTH QUESTIONNAIRE - PHQ9
2. FEELING DOWN, DEPRESSED OR HOPELESS: 0
SUM OF ALL RESPONSES TO PHQ9 QUESTIONS 1 & 2: 0
1. LITTLE INTEREST OR PLEASURE IN DOING THINGS: 0
SUM OF ALL RESPONSES TO PHQ QUESTIONS 1-9: 0
SUM OF ALL RESPONSES TO PHQ QUESTIONS 1-9: 0

## 2020-09-14 ASSESSMENT — LIFESTYLE VARIABLES: HOW OFTEN DO YOU HAVE A DRINK CONTAINING ALCOHOL: 0

## 2020-09-14 NOTE — PROGRESS NOTES
Medicare Annual Wellness Visit  Name: Petey Salas Date: 2020   MRN: [id number] Sex: Female   Age: 65 y.o. Ethnicity: Non-/Non    : 1950 Race: Katharine Soria is here for Medicare AWV    Screenings for behavioral, psychosocial and functional/safety risks, and cognitive dysfunction are all negative except as indicated below. These results, as well as other patient data from the [address] form, are documented in Flowsheets linked to this Encounter. Allergies   Allergen Reactions    Latex Rash    Norvasc [Amlodipine Besylate]     Ace Inhibitors      Cough    Soybean-Containing Drug Products          Prior to Visit Medications    Medication Sig Taking? Authorizing Provider   escitalopram (LEXAPRO) 10 MG tablet Take 1 tablet by mouth daily Yes Angelo Innocent, DO   simvastatin (ZOCOR) 20 MG tablet TAKE 1 TABLET BY MOUTH EVERY NIGHT Yes Angelo Innocent, DO   furosemide (LASIX) 20 MG tablet  Yes Historical Provider, MD   Glycopyrrolate (LONHALA MAGNAIR REFILL KIT) 25 MCG/ML SOLN Inhale 1 vial into the lungs 2 times daily Yes Yunior Resendez MD   diphenhydrAMINE HCl (SLEEP AID) 50 MG/30ML LIQD Take by mouth Yes Historical Provider, MD   montelukast (SINGULAIR) 10 MG tablet TAKE 1 TABLET BY MOUTH EVERY NIGHT Yes Yunior Resendez MD   fluticasone (FLONASE) 50 MCG/ACT nasal spray SHAKE LQ AND U 2 SPRAYS IEN QD Yes Historical Provider, MD   albuterol sulfate HFA (PROAIR HFA) 108 (90 Base) MCG/ACT inhaler Inhale 2 puffs into the lungs every 6 hours as needed for Wheezing Yes Yunior Resendez MD   losartan-hydrochlorothiazide (HYZAAR) 50-12.5 MG per tablet  Yes Historical Provider, MD   acetaminophen (TYLENOL) 500 MG tablet Take 500 mg by mouth every 6 hours as needed for Pain Yes Historical Provider, MD   metoprolol succinate (TOPROL XL) 25 MG extended release tablet TK 1 T PO QD Yes Historical Provider, MD   Nasal Wash SOLN by Nasal route as needed. Yes Historical Provider, MD         Past Medical History:   Diagnosis Date    Arthritis     Right Hip    Asthma     Cardiomyopathy, nonischemic ([facility]     hypertensive type    COPD (chronic obstructive pulmonary disease) ([facility] 2014    per PFT 2014    Edema     Essential hypertension     History of blood transfusion     Hyperlipidemia     Impaired fasting glucose     Major depressive disorder, single episode, unspecified     Mitral valve regurgitation     Obesity     Obstructive sleep apnea on CPAP     Osteopenia     Reflux esophagitis        Past Surgical History:   Procedure Laterality Date    CARDIAC CATHETERIZATION      X 2 - Dr. Layla White; No interventions    CATARACT REMOVAL      COLONOSCOPY      COLONOSCOPY  10/20/2014    colon polyp, internal hemorrhoids    DILATION AND CURETTAGE OF UTERUS      X 2    EYE SURGERY  8years old    WISDOM TOOTH EXTRACTION  as a child       No family history on file. CareTeam (Including outside providers/suppliers regularly involved in providing care):   Patient Care Team:  Kat Anaya DO as PCP - General  Kat Anaya DO as PCP - [facility] Empaneled Provider  Stephanie Escalante MD as Consulting Physician (Cardiology)  Aldo Enrique MD as Surgeon (Orthopedic Surgery)  Mahsa Mandel MD as Consulting Physician (Pulmonology)  [place] (Optometry)  Guerrero Dwyer MD as Consulting Physician (Ophthalmology)    Wt Readings from Last 3 Encounters:   09/14/20 230 lb (104.3 kg)   08/25/20 230 lb (104.3 kg)   05/21/20 235 lb (106.6 kg)     Vitals:    09/14/20 0859   Weight: 230 lb (104.3 kg)   Height: 5' (1.524 m)     Body mass index is 44.92 kg/m². Based upon direct observation of the patient, evaluation of cognition reveals recent and remote memory intact. Patient's complete Health Risk Assessment and screening values have been reviewed and are found in Flowsheets.  The following problems were reviewed today and where indicated follow up appointments were made and/or referrals ordered. Positive Risk Factor Screenings with Interventions:     General Health:  General  In general, how would you say your health is?: Good  In the past 7 days, have you experienced any of the following? New or Increased Pain, New or Increased Fatigue, Loneliness, Social Isolation, Stress or Anger?: (!) Stress  Do you get the social and emotional support that you need?: Yes  Do you have a Living Will?: (!) No  General Health Risk Interventions:  · Stress: patient's comments regarding reasons for stress and/or anger: Stress: patient's comments regarding reasons for stress and/or anger: Patient stated she has a neighbor who is slightly mentally impaired that will do things for her, but then expect pt to order her food. Advised pt to speak with the , and maybe come up with something to help relieve the situation, or speak with a family member of  her neighbor  · No Living Will: Patient stated she had previously completed a will, but never filed it. Advised her that she could contact an  and possibly do a video conference due to Rene. Also informed patient that we have the forms in our office for a Living Will    Health Habits/Nutrition:  Health Habits/Nutrition  Do you exercise for at least 20 minutes 2-3 times per week?: Yes  Have you lost any weight without trying in the past 3 months?: No  Do you eat fewer than 2 meals per day?: No  Have you seen a dentist within the past year?: Yes  Body mass index is 44.92 kg/m².   Health Habits/Nutrition Interventions:  · Nutritional issues:  patient is not ready to address his/her nutritional/weight issues at this time    Hearing/Vision:  No exam data present  Hearing/Vision  Do you or your family notice any trouble with your hearing?: (!) Yes  Do you have difficulty driving, watching TV, or doing any of your daily activities because of your eyesight?: No  Have you had an eye exam within the past year?: Yes  Hearing/Vision Interventions:  · Hearing concerns:  patient declines any further evaluation/treatment for hearing issues, Patient stated she does wear hearing aides and has the caption phone    Safety:  Safety  Do you have working smoke detectors?: Yes  Have all throw rugs been removed or fastened?: (!) No  Do you have non-slip mats or surfaces in all bathtubs/showers?: (!) No  Do all of your stairways have a railing or banister?: Yes  Are your doorways, halls and stairs free of clutter?: Yes  Do you always fasten your seatbelt when you are in a car?: Yes  Safety Interventions:  · Home safety tips provided verbally, encouraging patient to remove throw rugs and to purchase a non-slip mat for her shower    ADL:  ADLs  In the past 7 days, did you need help from others to perform any of the following everyday activities? Eating, dressing, grooming, bathing, toileting, or walking/balance?: None  In the past 7 days, did you need help from others to take care of any of the following? Laundry, housekeeping, banking/finances, shopping, telephone use, food preparation, transportation, or taking medications?: [employer], Shopping, Laundry  ADL Interventions:  · Patient declines any further evaluation/treatment for this issue stating she does have someone from [employer] that comes every 2 weeks to help with housekeeping and laundry, but stated she is trying to get to where she does all of her own laundry.  She stated she has someone do her shopping for her    Personalized Preventive Plan   Current Health Maintenance Status  Immunization History   Administered Date(s) Administered    Influenza Virus Vaccine 11/12/2014, 09/26/2019    Influenza, Triv, 3 Years and older, IM (Eva Sparks (5 yrs and older) 12/27/2018    Tdap (Boostrix, Adacel) 10/01/2017    Zoster Recombinant (Shingrix) 09/26/2019        Health Maintenance   Topic Date Due    Diabetic foot exam  02/26/1958    Diabetic retinal exam  02/26/1958  Diabetic microalbuminuria test  02/26/1966    Pneumococcal 65+ years Vaccine (1 of 1 - PPSV23) 02/26/2013    Colon cancer screen colonoscopy  10/20/2019    Annual Wellness Visit (AWV)  10/25/2019    Breast cancer screen  02/15/2020    Flu vaccine (1) 09/01/2020    A1C test (Diabetic or Prediabetic)  06/15/2021    Lipid screen  06/15/2021    Potassium monitoring  06/26/2021    Creatinine monitoring  06/26/2021    DTaP/Tdap/Td vaccine (2 - Td) 10/01/2027    DEXA (modify frequency per FRAX score)  Completed    Shingles Vaccine  Completed    Hepatitis C screen  Completed    Hepatitis A vaccine  Aged Out    Hepatitis B vaccine  Aged Out    Hib vaccine  Aged Out    Meningococcal (ACWY) vaccine  Aged Out     Recommendations for [employer] Due: see orders and patient instructions/AVS.    Unable to obtain 3 vital signs due to patient not having equipment to take temperature/BP. Recommended screening schedule for the next 5-10 years is provided to the patient in written form: see Patient Instructions/AVS.    Cathy Wasserman is a 65 y.o. female being evaluated by a Virtual Visit (audio visit) encounter to address concerns as mentioned above. A caregiver was present when appropriate. Due to this being a TeleHealth encounter (During HEMBR-38 public health emergency), evaluation of the following organ systems was limited: Vitals/Constitutional/EENT/Resp/CV/GI//MS/Neuro/Skin/Heme-Lymph-Imm. Pursuant to the emergency declaration under the [address] [place] [address] authority and the [employer] Act, this Virtual Visit was conducted with patient's (and/or legal guardian's) consent, to reduce the patient's risk of exposure to COVID-19 and provide necessary medical care.   The patient (and/or legal guardian) has also been advised to contact this office for worsening conditions or problems, and seek emergency medical treatment and/or call 911 if deemed necessary. Patient identification was verified at the start of the visit: Yes    Total time spent for this encounter: Not billed by time    Services were provided through a audio synchronous discussion virtually to substitute for in-person clinic visit. Patient and provider were located at their individual homes. --Sebastián Nearing on 9/14/2020 at 9:10 AM    An electronic signature was used to authenticate this note. Marisela Ribeiro, 9/14/2020, performed the documented evaluation under the direct supervision of the attending physician.

## 2020-09-14 NOTE — PATIENT INSTRUCTIONS
Personalized Preventive Plan for Kenney Casarez - 9/14/2020  Medicare offers a range of preventive health benefits. Some of the tests and screenings are paid in full while other may be subject to a deductible, co-insurance, and/or copay. Some of these benefits include a comprehensive review of your medical history including lifestyle, illnesses that may run in your family, and various assessments and screenings as appropriate. After reviewing your medical record and screening and assessments performed today your provider may have ordered immunizations, labs, imaging, and/or referrals for you. A list of these orders (if applicable) as well as your Preventive Care list are included within your After Visit Summary for your review. Other Preventive Recommendations:    · A preventive eye exam performed by an eye specialist is recommended every 1-2 years to screen for glaucoma; cataracts, macular degeneration, and other eye disorders. · A preventive dental visit is recommended every 6 months. · Try to get at least 150 minutes of exercise per week or 10,000 steps per day on a pedometer . · Order or download the FREE \"Exercise & Physical Activity: Your Everyday Guide\" from [employer]. Call 1-297.529.3833 or search [employer] online. · You need 3895-8168 mg of calcium and 4506-0208 IU of vitamin D per day. It is possible to meet your calcium requirement with diet alone, but a vitamin D supplement is usually necessary to meet this goal.  · When exposed to the sun, use a sunscreen that protects against both UVA and UVB radiation with an SPF of 30 or greater. Reapply every 2 to 3 hours or after sweating, drying off with a towel, or swimming. · Always wear a seat belt when traveling in a car. Always wear a helmet when riding a bicycle or motorcycle.

## 2020-09-19 PROBLEM — E78.5 HYPERLIPIDEMIA: Status: ACTIVE | Noted: 2020-09-19

## 2020-09-24 ENCOUNTER — HOSPITAL ENCOUNTER (OUTPATIENT)
Dept: WOMENS IMAGING | Age: 70
Discharge: HOME | End: 2020-09-24
Payer: MEDICARE

## 2020-09-24 PROCEDURE — 77067 SCR MAMMO BI INCL CAD: CPT

## 2020-10-07 ENCOUNTER — TELEPHONE (OUTPATIENT)
Dept: INTERNAL MEDICINE CLINIC | Age: 70
End: 2020-10-07

## 2020-10-07 NOTE — TELEPHONE ENCOUNTER
Patient left a voicemail with updates. She stated she seen her gynecologist and there was no cancer, no infection and was informed the discharge is normal for her. She stated the mammogram was negative. Patient stated she is scheduled for a consult for her colonoscopy. She seen Dr. Jose Adkins for her feet and he will be doing a pressure test on both of her legs.

## 2020-10-30 LAB — DIABETIC RETINOPATHY: NEGATIVE

## 2020-12-11 ENCOUNTER — TELEPHONE (OUTPATIENT)
Dept: INTERNAL MEDICINE CLINIC | Age: 70
End: 2020-12-11

## 2020-12-11 DIAGNOSIS — N95.1 POST MENOPAUSAL SYNDROME: Primary | ICD-10-CM

## 2020-12-11 NOTE — TELEPHONE ENCOUNTER
Patient called in requesting to have a Dexa Scan. Her GYN ordered but she doesn't want to have her scan in [place] she would like to have it here in [facility] and was told that she would have to get a new order.

## 2020-12-30 ENCOUNTER — HOSPITAL ENCOUNTER (OUTPATIENT)
Dept: WOMENS IMAGING | Age: 70
Discharge: HOME | End: 2020-12-30
Payer: MEDICARE

## 2020-12-30 PROCEDURE — 77080 DXA BONE DENSITY AXIAL: CPT

## 2021-03-02 ENCOUNTER — TELEPHONE (OUTPATIENT)
Dept: INTERNAL MEDICINE CLINIC | Age: 71
End: 2021-03-02

## 2021-06-08 RX ORDER — ESCITALOPRAM OXALATE 10 MG/1
10 TABLET ORAL DAILY
Qty: 90 TABLET | Refills: 0 | Status: SHIPPED | OUTPATIENT
Start: 2021-06-08 | End: 2022-04-07 | Stop reason: SDUPTHER

## 2021-09-13 ASSESSMENT — PATIENT HEALTH QUESTIONNAIRE - PHQ9
SUM OF ALL RESPONSES TO PHQ9 QUESTIONS 1 & 2: 0
1. LITTLE INTEREST OR PLEASURE IN DOING THINGS: 0
SUM OF ALL RESPONSES TO PHQ QUESTIONS 1-9: 0
SUM OF ALL RESPONSES TO PHQ QUESTIONS 1-9: 0
2. FEELING DOWN, DEPRESSED OR HOPELESS: 0
SUM OF ALL RESPONSES TO PHQ QUESTIONS 1-9: 0

## 2021-09-13 ASSESSMENT — LIFESTYLE VARIABLES: HOW OFTEN DO YOU HAVE A DRINK CONTAINING ALCOHOL: 0

## 2021-09-15 ASSESSMENT — LIFESTYLE VARIABLES
HOW OFTEN DO YOU HAVE A DRINK CONTAINING ALCOHOL: NEVER
AUDIT-C TOTAL SCORE: INCOMPLETE
AUDIT TOTAL SCORE: INCOMPLETE

## 2021-10-07 ENCOUNTER — TELEPHONE (OUTPATIENT)
Dept: INTERNAL MEDICINE CLINIC | Age: 71
End: 2021-10-07

## 2021-10-07 ENCOUNTER — HOSPITAL ENCOUNTER (OUTPATIENT)
Age: 71
Discharge: HOME | End: 2021-10-07
Payer: MEDICARE

## 2021-10-07 DIAGNOSIS — E11.9 TYPE 2 DIABETES MELLITUS WITHOUT COMPLICATION, WITH LONG-TERM CURRENT USE OF INSULIN (HCC): Primary | ICD-10-CM

## 2021-10-07 DIAGNOSIS — Z79.4 TYPE 2 DIABETES MELLITUS WITHOUT COMPLICATION, WITH LONG-TERM CURRENT USE OF INSULIN (HCC): Primary | ICD-10-CM

## 2021-10-07 DIAGNOSIS — E11.9 TYPE 2 DIABETES MELLITUS WITHOUT COMPLICATION, WITH LONG-TERM CURRENT USE OF INSULIN (HCC): ICD-10-CM

## 2021-10-07 DIAGNOSIS — Z79.4 TYPE 2 DIABETES MELLITUS WITHOUT COMPLICATION, WITH LONG-TERM CURRENT USE OF INSULIN (HCC): ICD-10-CM

## 2021-10-07 LAB
CREATININE URINE: 211.8 MG/DL (ref 28–217)
ESTIMATED AVERAGE GLUCOSE: 134 MG/DL
HBA1C MFR BLD: 6.3 % (ref 4.2–6.3)
MICROALBUMIN/CREAT 24H UR: 2.7 MG/DL
MICROALBUMIN/CREAT UR-RTO: 12.7 MG/G CREAT (ref 0–30)

## 2021-10-07 PROCEDURE — 82570 ASSAY OF URINE CREATININE: CPT

## 2021-10-07 PROCEDURE — 83036 HEMOGLOBIN GLYCOSYLATED A1C: CPT

## 2021-10-07 PROCEDURE — 36415 COLL VENOUS BLD VENIPUNCTURE: CPT

## 2021-10-07 PROCEDURE — 82043 UR ALBUMIN QUANTITATIVE: CPT

## 2021-10-07 NOTE — PROGRESS NOTES
[facility] lab calling to request new orders. Pt's previous A1c and Micro  2021. Orders entered.

## 2021-10-11 ENCOUNTER — VIRTUAL VISIT (OUTPATIENT)
Dept: INTERNAL MEDICINE CLINIC | Age: 71
End: 2021-10-11
Payer: MEDICARE

## 2021-10-11 DIAGNOSIS — Z00.00 ROUTINE GENERAL MEDICAL EXAMINATION AT A HEALTH CARE FACILITY: Primary | ICD-10-CM

## 2021-10-11 PROCEDURE — G0439 PPPS, SUBSEQ VISIT: HCPCS | Performed by: FAMILY MEDICINE

## 2021-10-11 RX ORDER — AMPICILLIN TRIHYDRATE 250 MG
1 CAPSULE ORAL DAILY
COMMUNITY
End: 2022-04-07

## 2021-10-11 SDOH — ECONOMIC STABILITY: FOOD INSECURITY: WITHIN THE PAST 12 MONTHS, THE FOOD YOU BOUGHT JUST DIDN'T LAST AND YOU DIDN'T HAVE MONEY TO GET MORE.: SOMETIMES TRUE

## 2021-10-11 SDOH — ECONOMIC STABILITY: FOOD INSECURITY: WITHIN THE PAST 12 MONTHS, YOU WORRIED THAT YOUR FOOD WOULD RUN OUT BEFORE YOU GOT MONEY TO BUY MORE.: SOMETIMES TRUE

## 2021-10-11 ASSESSMENT — SOCIAL DETERMINANTS OF HEALTH (SDOH): HOW HARD IS IT FOR YOU TO PAY FOR THE VERY BASICS LIKE FOOD, HOUSING, MEDICAL CARE, AND HEATING?: SOMEWHAT HARD

## 2021-10-11 ASSESSMENT — PATIENT HEALTH QUESTIONNAIRE - PHQ9
SUM OF ALL RESPONSES TO PHQ9 QUESTIONS 1 & 2: 0
SUM OF ALL RESPONSES TO PHQ QUESTIONS 1-9: 0
SUM OF ALL RESPONSES TO PHQ QUESTIONS 1-9: 0
1. LITTLE INTEREST OR PLEASURE IN DOING THINGS: 0
2. FEELING DOWN, DEPRESSED OR HOPELESS: 0
SUM OF ALL RESPONSES TO PHQ QUESTIONS 1-9: 0

## 2021-10-11 ASSESSMENT — LIFESTYLE VARIABLES: HOW OFTEN DO YOU HAVE A DRINK CONTAINING ALCOHOL: 0

## 2021-10-11 NOTE — PATIENT INSTRUCTIONS
Personalized Preventive Plan for Jono Thomas - 10/11/2021  Medicare offers a range of preventive health benefits. Some of the tests and screenings are paid in full while other may be subject to a deductible, co-insurance, and/or copay. Some of these benefits include a comprehensive review of your medical history including lifestyle, illnesses that may run in your family, and various assessments and screenings as appropriate. After reviewing your medical record and screening and assessments performed today your provider may have ordered immunizations, labs, imaging, and/or referrals for you. A list of these orders (if applicable) as well as your Preventive Care list are included within your After Visit Summary for your review. Other Preventive Recommendations:    · A preventive eye exam performed by an eye specialist is recommended every 1-2 years to screen for glaucoma; cataracts, macular degeneration, and other eye disorders. · A preventive dental visit is recommended every 6 months. · Try to get at least 150 minutes of exercise per week or 10,000 steps per day on a pedometer . · Order or download the FREE \"Exercise & Physical Activity: Your Everyday Guide\" from [employer]. Call 9-734.643.9698 or search [employer] online. · You need 3356-3328 mg of calcium and 1373-2480 IU of vitamin D per day. It is possible to meet your calcium requirement with diet alone, but a vitamin D supplement is usually necessary to meet this goal.  · When exposed to the sun, use a sunscreen that protects against both UVA and UVB radiation with an SPF of 30 or greater. Reapply every 2 to 3 hours or after sweating, drying off with a towel, or swimming. · Always wear a seat belt when traveling in a car. Always wear a helmet when riding a bicycle or motorcycle.

## 2021-10-11 NOTE — PROGRESS NOTES
CERTIFICATE OF WORK      December 27, 2020      Re: Natasha Yun  [address]  [facility] [address]      This is to certify that Natasha Yun has been under my care from 12/27/2020 and can return to regular work on 01/03/2020    RESTRICTIONS: none      REMARKS: none        SIGNATURE:___________________________________________          Natalee Gatica MD  [facility]  [address]  [facility] [address]  Dept Phone: [phone]     Medicare Annual Wellness Visit  Name: Mark Anthony Zhou Date: 10/11/2021   MRN: [id number] Sex: Female   Age: 66 y.o. Ethnicity: Non- / Non    : 1950 Race: White (non-)      Vania Luque is here for Medicare AWV    Screenings for behavioral, psychosocial and functional/safety risks, and cognitive dysfunction are all negative except as indicated below. These results, as well as other patient data from the [address] form, are documented in Flowsheets linked to this Encounter. Allergies   Allergen Reactions    Latex Rash    Norvasc [Amlodipine Besylate]     Ace Inhibitors      Cough    Soybean-Containing Drug Products        Prior to Visit Medications    Medication Sig Taking?  Authorizing Provider   Red Yeast Rice 600 MG CAPS Take 1 capsule by mouth daily Indications: Pt reports 1-2 daily Yes Historical Provider, MD   Coenzyme Q10 (COQ10 PO) Take 1 capsule by mouth daily Yes Historical Provider, MD   Potassium 99 MG TABS Take 1 tablet by mouth daily Indications: OTC Yes Historical Provider, MD   umeclidinium-vilanterol (ANORO ELLIPTA) 62.5-25 MCG/INH AEPB inhaler Inhale 1 puff into the lungs daily Yes Mariana Vogel MD   escitalopram (LEXAPRO) 10 MG tablet TAKE 1 TABLET BY MOUTH DAILY Yes Matilda Jama DO   Calcium Carb-Cholecalciferol 600-800 MG-UNIT CHEW DAILY Yes Historical Provider, MD   albuterol sulfate  (90 Base) MCG/ACT inhaler INHALE 2 PUFFS INTO THE LUNGS EVERY 6 HOURS AS NEEDED FOR WHEEZING Yes Mariana Vogel MD   montelukast (SINGULAIR) 10 MG tablet TAKE 1 TABLET BY MOUTH EVERY NIGHT Yes Mariana Vogel MD   diphenhydrAMINE HCl (SLEEP AID) 50 MG/30ML LIQD Take by mouth Yes Historical Provider, MD   fluticasone (FLONASE) 50 MCG/ACT nasal spray SHAKE LQ AND U 2 SPRAYS IEN QD Yes Historical Provider, MD   losartan-hydrochlorothiazide (HYZAAR) 50-12.5 MG per tablet  Yes Historical Provider, MD   acetaminophen (TYLENOL) 500 MG tablet Take 500 mg by mouth every 6 hours as needed for Pain Yes Historical Provider, MD   metoprolol succinate (TOPROL XL) 25 MG extended release tablet TK 1 T PO QD Yes Historical Provider, MD   simvastatin (ZOCOR) 20 MG tablet TAKE 1 TABLET BY MOUTH EVERY NIGHT  Patient not taking: Reported on 10/11/2021  Reggy Sicard, DO   furosemide (LASIX) 20 MG tablet   Historical Provider, MD   Glycopyrrolate [facility] RE[facility]) 25 MCG/ML SOLN Inhale 1 vial into the lungs 2 times daily  Patient not taking: Reported on 10/11/2021  Honor Ahumada, MD   Nasal Wash SOLN by Nasal route as needed.   Patient not taking: Reported on 10/11/2021  Historical Provider, MD       Past Medical History:   Diagnosis Date    Arthritis     Right Hip    Asthma     Cardiomyopathy, nonischemic ([address]     hypertensive type    COPD (chronic obstructive pulmonary disease) ([facility] 2014    per PFT 2014    Edema     Essential hypertension     History of blood transfusion     Hyperlipidemia     Major depressive disorder, single episode, unspecified     Mitral valve regurgitation     Obesity     Obstructive sleep apnea on CPAP     Osteopenia     Reflux esophagitis        Past Surgical History:   Procedure Laterality Date    CARDIAC CATHETERIZATION      X 2 - Dr. Rosalinda Azul; No interventions    CATARACT REMOVAL      COLONOSCOPY      COLONOSCOPY  10/20/2014    colon polyp, internal hemorrhoids    DILATION AND CURETTAGE OF UTERUS      X 2    EYE SURGERY  8years old   [facility]  as a child       Family History   Adopted: Yes   Problem Relation Age of Onset    Heart Disease Mother     Other Father         killed in the [facility] remains found    No Known Problems Sister     No Known Problems Brother        CareTeam (Including outside providers/suppliers regularly involved in providing care):   Patient Care Team:  Reggy Sicard, DO as PCP - General  Reggy Sicard, DO as PCP - [facility] Empaneled Provider  Petty Torsten Chiu MD as Consulting Physician (Cardiology)  Kymberly Wilson MD as Surgeon (Orthopedic Surgery)  Randy Enriquez MD as Consulting Physician (Pulmonology)  Dean Huber (Optometry)  Zee Hernandez MD as Consulting Physician (Ophthalmology)    Wt Readings from Last 3 Encounters:   08/25/21 239 lb 8 oz (108.6 kg)   04/21/21 232 lb (105.2 kg)   12/01/20 237 lb (107.5 kg)     There were no vitals filed for this visit. There is no height or weight on file to calculate BMI. Based upon direct observation of the patient, evaluation of cognition reveals recent and remote memory intact. Patient's complete Health Risk Assessment and screening values have been reviewed and are found in Flowsheets. The following problems were reviewed today and where indicated follow up appointments were made and/or referrals ordered. Positive Risk Factor Screenings with Interventions:          General Health and ACP:  General  In general, how would you say your health is?: Fair  In the past 7 days, have you experienced any of the following?  New or Increased Pain, New or Increased Fatigue, Loneliness, Social Isolation, Stress or Anger?: None of These  Do you get the social and emotional support that you need?: Yes  Do you have a Living Will?: (!) No  Advance Directives     Power of [address] [place] [address] Will ACP-Advance Directive ACP-Power of     Not on File Not on File Not on File Not on File      General Health Risk Interventions:  · No Living Will: Advance Care Planning addressed with patient today    Health Habits/Nutrition:  Health Habits/Nutrition  Do you exercise for at least 20 minutes 2-3 times per week?: (!) No  Have you lost any weight without trying in the past 3 months?: No  Do you eat only one meal per day?: No  Have you seen the dentist within the past year?: Yes     Health Habits/Nutrition Interventions:  · Inadequate physical activity:  patient is not ready to increase his/her physical activity level at this time    Hearing/Vision:  No exam data present  Hearing/Vision  Do you or your family notice any trouble with your hearing that hasn't been managed with hearing aids?: (!) Yes (pt states she has hearing aids, states they fall out of her ears easily and is going to find out what's going on)  Do you have difficulty driving, watching TV, or doing any of your daily activities because of your eyesight?: No  Have you had an eye exam within the past year?: Yes  Hearing/Vision Interventions:  · Hearing concerns:  patient declines any further evaluation/treatment for hearing issues, states she has another ENT she is going to see, states she doesn't need a referral for her insurance    Safety:  Safety  Do you have working smoke detectors?: Yes  Have all throw rugs been removed or fastened?: Yes  Do you have non-slip mats or surfaces in all bathtubs/showers?: (!) No  Do all of your stairways have a railing or banister?: Yes  Are your doorways, halls and stairs free of clutter?: Yes  Do you always fasten your seatbelt when you are in a car?: Yes  Safety Interventions:  · Home safety tips provided verbally     Personalized Preventive Plan   Current Health Maintenance Status  Immunization History   Administered Date(s) Administered    COVID-19, Ramos Peter, PF, 30mcg/0.3mL 02/02/2021, 02/23/2021    Influenza Virus Vaccine 11/12/2014, 09/26/2019    Influenza, Triv, 3 Years and older, IM (Afluria (5 yrs and older) 12/27/2018    Tdap (Boostrix, Adacel) 10/01/2017    Zoster Recombinant (Shingrix) 09/26/2019        Health Maintenance   Topic Date Due    Diabetic foot exam  Never done    Pneumococcal 65+ years Vaccine (1 of 1 - PPSV23) Never done    Lipid screen  06/15/2021    Potassium monitoring  06/26/2021    Creatinine monitoring  06/26/2021    Annual Wellness Visit (AWV)  09/15/2021    Breast cancer screen  09/24/2022    A1C test (Diabetic or Prediabetic)  10/07/2022    Diabetic microalbuminuria test 10/07/2022    Diabetic retinal exam  10/30/2022    Colon cancer screen colonoscopy  03/05/2026    DTaP/Tdap/Td vaccine (2 - Td or Tdap) 10/01/2027    DEXA (modify frequency per FRAX score)  Completed    Flu vaccine  Completed    Shingles Vaccine  Completed    COVID-19 Vaccine  Completed    Hepatitis C screen  Completed    Hepatitis A vaccine  Aged Out    Hib vaccine  Aged Out    Meningococcal (ACWY) vaccine  Aged Out     Recommendations for [place] Due: see orders and patient instructions/AVS. Discussed pneumococcal vaccination needed. Unable to obtain 3 vital signs due to patient not having equipment to take blood pressure/temperature. Recommended screening schedule for the next 5-10 years is provided to the patient in written form: see Patient Instructions/AVS. Patient states Dr. Alanis Cano does her foot exams-LPN requested notes for scanning/updating chart. Kalia Banks LPN, 43/95/3693, performed the documented evaluation under the direct supervision of the attending physician. Linda Hollins, was evaluated through a synchronous (real-time) audio encounter. The patient (or guardian if applicable) is aware that this is a billable service. Verbal consent to proceed has been obtained within the past 12 months. The visit was conducted pursuant to the emergency declaration under the [address] [place] [address], [address] [place] [address] authority and the [employer] General Act. Patient identification was verified, and a caregiver was present when appropriate. The patient was located in the state of [place], where the provider was credentialed to provide care. Total time spent for this encounter: Not billed by time    --Susan Emerson LPN on 23/35/9007 at 3:18 PM    An electronic signature was used to authenticate this note.

## 2022-04-07 ENCOUNTER — TELEMEDICINE (OUTPATIENT)
Dept: INTERNAL MEDICINE CLINIC | Age: 72
End: 2022-04-07
Payer: MEDICARE

## 2022-04-07 DIAGNOSIS — F32.A DEPRESSION, UNSPECIFIED DEPRESSION TYPE: ICD-10-CM

## 2022-04-07 DIAGNOSIS — L03.119 CELLULITIS OF LOWER EXTREMITY, UNSPECIFIED LATERALITY: ICD-10-CM

## 2022-04-07 DIAGNOSIS — Z79.4 TYPE 2 DIABETES MELLITUS WITHOUT COMPLICATION, WITH LONG-TERM CURRENT USE OF INSULIN (HCC): Primary | ICD-10-CM

## 2022-04-07 DIAGNOSIS — I10 ESSENTIAL HYPERTENSION: ICD-10-CM

## 2022-04-07 DIAGNOSIS — E11.9 TYPE 2 DIABETES MELLITUS WITHOUT COMPLICATION, WITH LONG-TERM CURRENT USE OF INSULIN (HCC): Primary | ICD-10-CM

## 2022-04-07 DIAGNOSIS — E78.5 HYPERLIPIDEMIA, UNSPECIFIED HYPERLIPIDEMIA TYPE: ICD-10-CM

## 2022-04-07 PROCEDURE — 99214 OFFICE O/P EST MOD 30 MIN: CPT | Performed by: FAMILY MEDICINE

## 2022-04-07 RX ORDER — SIMVASTATIN 20 MG
20 TABLET ORAL EVERY EVENING
Qty: 90 TABLET | Refills: 1 | Status: SHIPPED | OUTPATIENT
Start: 2022-04-07 | End: 2022-10-03

## 2022-04-07 RX ORDER — ESCITALOPRAM OXALATE 10 MG/1
10 TABLET ORAL DAILY
Qty: 90 TABLET | Refills: 1 | Status: SHIPPED | OUTPATIENT
Start: 2022-04-07 | End: 2022-10-03

## 2022-04-07 RX ORDER — CEPHALEXIN 500 MG/1
500 CAPSULE ORAL 4 TIMES DAILY
Qty: 20 CAPSULE | Refills: 0 | Status: SHIPPED | OUTPATIENT
Start: 2022-04-07 | End: 2022-07-14

## 2022-04-07 RX ORDER — IBUPROFEN 200 MG
400 TABLET ORAL EVERY 6 HOURS PRN
Status: ON HOLD | COMMUNITY
End: 2022-06-30 | Stop reason: HOSPADM

## 2022-04-07 ASSESSMENT — ENCOUNTER SYMPTOMS
NAUSEA: 0
BACK PAIN: 0
ABDOMINAL PAIN: 0
SHORTNESS OF BREATH: 1
COUGH: 0

## 2022-04-07 NOTE — PROGRESS NOTES
2022    TELEHEALTH EVALUATION -- Audio/Visual (During LDXUA-69 public health emergency)    HPI:    Wilmer Bang (:  1950) has requested an audio/video evaluation for the following concern(s):  Patient Active Problem List    Diagnosis Date Noted    Hyperlipidemia 2020    Cardi[facility]ischemic [facility]     Essential hypertension     Type 2 diabetes mellitus without complication, with long-term current use of insulin ([facility] 2020    Depression 2020    YOLANDA on CPAP 2017    Centrilobular emphysema ([facility] 10/22/2015    Mild intermittent asthma without complication      DM II- Pt managing with her diet  HTN- stable on mediations  HLD- She has been off her cholesterol medication for as  Depression- she wants to start Lexapro. She was using [employer] and it did not help  She has some slight redness and warmth to bilateral shins  Pt states she has been dealing with bedbugs and cockroaches in her home. She plans to get a new home  Pt getting meals on wheels    Review of Systems   Constitutional: Negative for diaphoresis and fever. Respiratory: Positive for shortness of breath (LEDBETTER- chronic). Negative for cough. Cardiovascular: Negative for chest pain and palpitations. Gastrointestinal: Negative for abdominal pain and nausea. Genitourinary: Negative for difficulty urinating. Musculoskeletal: Negative for back pain. Neurological: Negative for dizziness and headaches. Psychiatric/Behavioral: Negative for dysphoric mood. Prior to Visit Medications    Medication Sig Taking?  Authorizing Provider   ibuprofen (ADVIL;MOTRIN) 200 MG tablet Take 400 mg by mouth every 6 hours as needed for Pain Yes Historical Provider, MD   escitalopram (LEXAPRO) 10 MG tablet Take 1 tablet by mouth daily Yes Belia Looney DO   simvastatin (ZOCOR) 20 MG tablet Take 1 tablet by mouth every evening Yes Belia Looney DO   cephALEXin (KEFLEX) 500 MG capsule Take 1 capsule by mouth 4 times daily Yes Pawan Chairez DO   montelukast (SINGULAIR) 10 MG tablet Take 1 tablet by mouth nightly Yes Criselda Rollins MD   Coenzyme Q10 (COQ10 PO) Take 1 capsule by mouth daily Yes Historical Provider, MD   Potassium 99 MG TABS Take 1 tablet by mouth daily Indications: OTC Yes Historical Provider, MD   umeclidinium-vilanterol (ANORO ELLIPTA) 62.5-25 MCG/INH AEPB inhaler Inhale 1 puff into the lungs daily Yes Criselda Rollins MD   Calcium Carb-Cholecalciferol 600-800 MG-UNIT CHEW DAILY Yes Historical Provider, MD   albuterol sulfate  (90 Base) MCG/ACT inhaler INHALE 2 PUFFS INTO THE LUNGS EVERY 6 HOURS AS NEEDED FOR WHEEZING Yes Criselda Rollins MD   diphenhydrAMINE HCl (SLEEP AID) 50 MG/30ML LIQD Take by mouth Yes Historical Provider, MD   fluticasone (FLONASE) 50 MCG/ACT nasal spray SHAKE LQ AND U 2 SPRAYS IEN QD Yes Historical Provider, MD   losartan-hydrochlorothiazide (HYZAAR) 50-12.5 MG per tablet  Yes Historical Provider, MD   metoprolol succinate (TOPROL XL) 25 MG extended release tablet TK 1 T PO QD Yes Historical Provider, MD       Social History     Tobacco Use    Smoking status: Never Smoker    Smokeless tobacco: Never Used   Substance Use Topics    Alcohol use:  Yes     Alcohol/week: 0.0 standard drinks     Comment: Occassional    Drug use: No        Past Medical History:   Diagnosis Date    Arthritis     Right Hip    Asthma     [facility]     hypertensive type    COPD (chronic obstructive pulmonary disease) [facility] 2014    per PFT 2014    Edema     Essential hypertension     History of blood transfusion     Hyperlipidemia     Major depressive disorder, single episode, unspecified     Mitral valve regurgitation     Obesity     Obstructive sleep apnea on CPAP     Osteopenia     Reflux esophagitis        PHYSICAL EXAMINATION:  [ INSTRUCTIONS:  \"[x]\" Indicates a positive item  \"[]\" Indicates a negative item  -- DELETE ALL ITEMS NOT EXAMINED]  Vital Signs: (As obtained by patient/caregiver or practitioner observation)    Blood pressure- 142/98 (No meds used today)  Heart rate-75       Constitutional: [x] Appears well-developed and well-nourished [x] No apparent distress      [] Abnormal-   Mental status  [x] Alert and awake  [x] Oriented to person/place/time [x]Able to follow commands      Eyes:  EOM    [x]  Normal  [] Abnormal-  Sclera  [x]  Normal  [] Abnormal -         Discharge []  None visible  [] Abnormal -    HENT:   [x] Normocephalic, atraumatic. [] Abnormal   [] Mouth/Throat: Mucous membranes are moist.     External Ears [x] Normal  [] Abnormal-     Neck: [x] No visualized mass     Pulmonary/Chest: [x] Respiratory effort normal.  [x] No visualized signs of difficulty breathing or respiratory distress        [] Abnormal-      Musculoskeletal:   [] Normal gait with no signs of ataxia         [x] Normal range of motion of neck        [] Abnormal-       Neurological:        [x] No Facial Asymmetry (Cranial nerve 7 motor function) (limited exam to video visit)          [] No gaze palsy        [] Abnormal-         Skin:        [] No significant exanthematous lesions or discoloration noted on facial skin         [x] Abnormal- slightly red bilateral lower legs           Psychiatric:       [x] Normal Affect [] No Hallucinations        [] Abnormal-     Other pertinent observable physical exam findings-     ASSESSMENT/PLAN:  1. Type 2 diabetes mellitus without complication, with long-term current use of insulin ([facility] chronic    - Comprehensive Metabolic Panel; Future  - Hemoglobin A1C; Future    2. Depression, unspecified depression type, chronic    -Start Lexapro  - escitalopram (LEXAPRO) 10 MG tablet; Take 1 tablet by mouth daily  Dispense: 90 tablet; Refill: 1    3. Hyperlipidemia, unspecified hyperlipidemia type, chronic    -Start Zocor  - Lipid Panel; Future  - simvastatin (ZOCOR) 20 MG tablet;  Take 1 tablet by mouth every evening Dispense: 90 tablet; Refill: 1    4. Essential hypertension, chronic  Continue medications  - CBC with Auto Differential; Future    5. Cellulitis of lower extremity, unspecified laterality    *Start  - cephALEXin (KEFLEX) 500 MG capsule; Take 1 capsule by mouth 4 times daily  Dispense: 20 capsule; Refill: 0    Persist RTO or call  Return for Follow up in 3  for Diabetes, HTN, HLD. Su Alcaraz, was evaluated through a synchronous (real-time) audio-video encounter. The patient (or guardian if applicable) is aware that this is a billable service, which includes applicable co-pays. This Virtual Visit was conducted with patient's (and/or legal guardian's) consent. The visit was conducted pursuant to the emergency declaration under the [address] [place] [address], [address] [place] [address] waiver authority and the [employer] General Act. Patient identification was verified, and a caregiver was present when appropriate. The patient was located at home in a state where the provider was licensed to provide care. Total time spent on this encounter: Not billed by time    --Tyler Millan DO     An electronic signature was used to authenticate this note.

## 2022-04-20 ENCOUNTER — TELEPHONE (OUTPATIENT)
Dept: INTERNAL MEDICINE CLINIC | Age: 72
End: 2022-04-20

## 2022-04-20 NOTE — TELEPHONE ENCOUNTER
Pt called stating that several months ago she had vaginal bloody discharge. Pt saw a female GYN in [facility] and and exam with a cancer work up which showed no cancer. Bloody discharge stopped and now is back. Pt states the GYN she saw in [facility] moved to [place] and pt is looking for another female GYN in [place] [address]. Advised pt to call her ins co to see who is in network and call us back and we will send a referral. Pt voiced understanding.

## 2022-05-02 ENCOUNTER — TELEPHONE (OUTPATIENT)
Dept: INTERNAL MEDICINE CLINIC | Age: 72
End: 2022-05-02

## 2022-05-02 DIAGNOSIS — N95.0 PMB (POSTMENOPAUSAL BLEEDING): Primary | ICD-10-CM

## 2022-05-02 NOTE — TELEPHONE ENCOUNTER
Pt states she was seen by Dr. Jordan Rogers for her PMB. She states told it was 'not cancer' and she states the Gyn didn't pursue it. She called [facility] but they did not take her insurance. Pt still with symptoms.  I will refer to [facility]- Referral ordered

## 2022-05-02 NOTE — TELEPHONE ENCOUNTER
----- Message from Ashley Ekaterina sent at 4/26/2022  1:53 PM EDT -----  Subject: Referral Request    QUESTIONS   Reason for referral request? Pt needs a new referral for post menopausal   bleeding. The OBGYN that she was referred to was not in network. Has the physician seen you for this condition before? No   Preferred Specialist (if applicable)? Do you already have an appointment scheduled? Additional Information for Provider?   ---------------------------------------------------------------------------  --------------  CALL BACK INFO  What is the best way for the office to contact you? OK to leave message on   voicemail  Preferred Call Back Phone Number? [phone]  ---------------------------------------------------------------------------  --------------  SCRIPT ANSWERS  Relationship to Patient? Third Party  Third Party Type? Hospital?   Representative Name?  Mary Munoz

## 2022-06-09 ENCOUNTER — HOSPITAL ENCOUNTER (OUTPATIENT)
Age: 72
Discharge: HOME | End: 2022-06-09
Payer: MEDICARE

## 2022-06-09 DIAGNOSIS — E78.5 HYPERLIPIDEMIA, UNSPECIFIED HYPERLIPIDEMIA TYPE: ICD-10-CM

## 2022-06-09 DIAGNOSIS — E11.9 TYPE 2 DIABETES MELLITUS WITHOUT COMPLICATION, WITH LONG-TERM CURRENT USE OF INSULIN (HCC): ICD-10-CM

## 2022-06-09 DIAGNOSIS — Z79.4 TYPE 2 DIABETES MELLITUS WITHOUT COMPLICATION, WITH LONG-TERM CURRENT USE OF INSULIN (HCC): ICD-10-CM

## 2022-06-09 DIAGNOSIS — I10 ESSENTIAL HYPERTENSION: ICD-10-CM

## 2022-06-09 LAB
ALBUMIN SERPL-MCNC: 4.3 GM/DL (ref 3.4–5)
ALP BLD-CCNC: 97 IU/L (ref 40–128)
ALT SERPL-CCNC: 18 U/L (ref 10–40)
ANION GAP SERPL CALCULATED.3IONS-SCNC: 15 MMOL/L (ref 4–16)
AST SERPL-CCNC: 17 IU/L (ref 15–37)
BASOPHILS ABSOLUTE: 0 K/CU MM
BASOPHILS RELATIVE PERCENT: 0.3 % (ref 0–1)
BILIRUB SERPL-MCNC: 0.3 MG/DL (ref 0–1)
BUN BLDV-MCNC: 11 MG/DL (ref 6–23)
CALCIUM SERPL-MCNC: 8.8 MG/DL (ref 8.3–10.6)
CHLORIDE BLD-SCNC: 101 MMOL/L (ref 99–110)
CHOLESTEROL: 159 MG/DL
CO2: 26 MMOL/L (ref 21–32)
CREAT SERPL-MCNC: 0.6 MG/DL (ref 0.6–1.1)
DIFFERENTIAL TYPE: ABNORMAL
EOSINOPHILS ABSOLUTE: 0.3 K/CU MM
EOSINOPHILS RELATIVE PERCENT: 3.8 % (ref 0–3)
ESTIMATED AVERAGE GLUCOSE: 143 MG/DL
GFR AFRICAN AMERICAN: >60 ML/MIN/1.73M2
GFR NON-AFRICAN AMERICAN: >60 ML/MIN/1.73M2
GLUCOSE BLD-MCNC: 119 MG/DL (ref 70–99)
HBA1C MFR BLD: 6.6 % (ref 4.2–6.3)
HCT VFR BLD CALC: 39.5 % (ref 37–47)
HDLC SERPL-MCNC: 50 MG/DL
HEMOGLOBIN: 12 GM/DL (ref 12.5–16)
IMMATURE NEUTROPHIL %: 0.5 % (ref 0–0.43)
LDL CHOLESTEROL CALCULATED: 74 MG/DL
LYMPHOCYTES ABSOLUTE: 1.6 K/CU MM
LYMPHOCYTES RELATIVE PERCENT: 21.5 % (ref 24–44)
MCH RBC QN AUTO: 27.4 PG (ref 27–31)
MCHC RBC AUTO-ENTMCNC: 30.4 % (ref 32–36)
MCV RBC AUTO: 90.2 FL (ref 78–100)
MONOCYTES ABSOLUTE: 0.5 K/CU MM
MONOCYTES RELATIVE PERCENT: 6.1 % (ref 0–4)
NUCLEATED RBC %: 0 %
PDW BLD-RTO: 13.9 % (ref 11.7–14.9)
PLATELET # BLD: 224 K/CU MM (ref 140–440)
PMV BLD AUTO: 9.4 FL (ref 7.5–11.1)
POTASSIUM SERPL-SCNC: 4.1 MMOL/L (ref 3.5–5.1)
RBC # BLD: 4.38 M/CU MM (ref 4.2–5.4)
SEGMENTED NEUTROPHILS ABSOLUTE COUNT: 5 K/CU MM
SEGMENTED NEUTROPHILS RELATIVE PERCENT: 67.8 % (ref 36–66)
SODIUM BLD-SCNC: 142 MMOL/L (ref 135–145)
TOTAL IMMATURE NEUTOROPHIL: 0.04 K/CU MM
TOTAL NUCLEATED RBC: 0 K/CU MM
TOTAL PROTEIN: 6.5 GM/DL (ref 6.4–8.2)
TRIGL SERPL-MCNC: 176 MG/DL
WBC # BLD: 7.4 K/CU MM (ref 4–10.5)

## 2022-06-09 PROCEDURE — 83036 HEMOGLOBIN GLYCOSYLATED A1C: CPT

## 2022-06-09 PROCEDURE — 85025 COMPLETE CBC W/AUTO DIFF WBC: CPT

## 2022-06-09 PROCEDURE — 80061 LIPID PANEL: CPT

## 2022-06-09 PROCEDURE — 36415 COLL VENOUS BLD VENIPUNCTURE: CPT

## 2022-06-09 PROCEDURE — 80053 COMPREHEN METABOLIC PANEL: CPT

## 2022-06-22 LAB
LEFT VENTRICULAR EJECTION FRACTION MODE: NORMAL
LEFT VENTRICULAR EJECTION FRACTION MODE: NORMAL
LV EF: NORMAL %
LV EF: NORMAL %

## 2022-06-27 ENCOUNTER — HOSPITAL ENCOUNTER (OUTPATIENT)
Age: 72
Discharge: HOME | End: 2022-06-27
Payer: MEDICARE

## 2022-06-27 LAB
ANION GAP SERPL CALCULATED.3IONS-SCNC: 12 MMOL/L (ref 4–16)
APTT: 31.3 SECONDS (ref 25.1–37.1)
BASOPHILS ABSOLUTE: 0 K/CU MM
BASOPHILS RELATIVE PERCENT: 0.5 % (ref 0–1)
BUN BLDV-MCNC: 15 MG/DL (ref 6–23)
CALCIUM SERPL-MCNC: 9.5 MG/DL (ref 8.3–10.6)
CHLORIDE BLD-SCNC: 101 MMOL/L (ref 99–110)
CO2: 27 MMOL/L (ref 21–32)
CREAT SERPL-MCNC: 0.7 MG/DL (ref 0.6–1.1)
DIFFERENTIAL TYPE: ABNORMAL
EOSINOPHILS ABSOLUTE: 0.3 K/CU MM
EOSINOPHILS RELATIVE PERCENT: 3.4 % (ref 0–3)
GFR AFRICAN AMERICAN: >60 ML/MIN/1.73M2
GFR NON-AFRICAN AMERICAN: >60 ML/MIN/1.73M2
GLUCOSE BLD-MCNC: 122 MG/DL (ref 70–99)
HCT VFR BLD CALC: 41.2 % (ref 37–47)
HEMOGLOBIN: 12.7 GM/DL (ref 12.5–16)
IMMATURE NEUTROPHIL %: 0.2 % (ref 0–0.43)
INR BLD: 0.88 INDEX
LYMPHOCYTES ABSOLUTE: 2.2 K/CU MM
LYMPHOCYTES RELATIVE PERCENT: 27.3 % (ref 24–44)
MCH RBC QN AUTO: 27 PG (ref 27–31)
MCHC RBC AUTO-ENTMCNC: 30.8 % (ref 32–36)
MCV RBC AUTO: 87.7 FL (ref 78–100)
MONOCYTES ABSOLUTE: 0.5 K/CU MM
MONOCYTES RELATIVE PERCENT: 6 % (ref 0–4)
NUCLEATED RBC %: 0 %
PDW BLD-RTO: 13.8 % (ref 11.7–14.9)
PLATELET # BLD: 262 K/CU MM (ref 140–440)
PMV BLD AUTO: 9.3 FL (ref 7.5–11.1)
POTASSIUM SERPL-SCNC: 4.4 MMOL/L (ref 3.5–5.1)
PROTHROMBIN TIME: 11.3 SECONDS (ref 11.7–14.5)
RBC # BLD: 4.7 M/CU MM (ref 4.2–5.4)
SEGMENTED NEUTROPHILS ABSOLUTE COUNT: 5.1 K/CU MM
SEGMENTED NEUTROPHILS RELATIVE PERCENT: 62.6 % (ref 36–66)
SODIUM BLD-SCNC: 140 MMOL/L (ref 135–145)
TOTAL IMMATURE NEUTOROPHIL: 0.02 K/CU MM
TOTAL NUCLEATED RBC: 0 K/CU MM
WBC # BLD: 8.1 K/CU MM (ref 4–10.5)

## 2022-06-27 PROCEDURE — 36415 COLL VENOUS BLD VENIPUNCTURE: CPT

## 2022-06-27 PROCEDURE — 85025 COMPLETE CBC W/AUTO DIFF WBC: CPT

## 2022-06-27 PROCEDURE — 85610 PROTHROMBIN TIME: CPT

## 2022-06-27 PROCEDURE — 80048 BASIC METABOLIC PNL TOTAL CA: CPT

## 2022-06-27 PROCEDURE — 85730 THROMBOPLASTIN TIME PARTIAL: CPT

## 2022-06-30 ENCOUNTER — HOSPITAL ENCOUNTER (OUTPATIENT)
Dept: CARDIAC CATH/INVASIVE PROCEDURES | Age: 72
Discharge: HOME | End: 2022-06-30
Attending: INTERNAL MEDICINE | Admitting: INTERNAL MEDICINE
Payer: MEDICARE

## 2022-06-30 VITALS
SYSTOLIC BLOOD PRESSURE: 149 MMHG | WEIGHT: 230 LBS | TEMPERATURE: 95.6 F | OXYGEN SATURATION: 95 % | RESPIRATION RATE: 18 BRPM | BODY MASS INDEX: 45.16 KG/M2 | DIASTOLIC BLOOD PRESSURE: 89 MMHG | HEIGHT: 60 IN | HEART RATE: 80 BPM

## 2022-06-30 PROCEDURE — C1751 CATH, INF, PER/CENT/MIDLINE: HCPCS

## 2022-06-30 PROCEDURE — 6370000000 HC RX 637 (ALT 250 FOR IP): Performed by: INTERNAL MEDICINE

## 2022-06-30 PROCEDURE — 2500000003 HC RX 250 WO HCPCS

## 2022-06-30 PROCEDURE — 2580000003 HC RX 258: Performed by: INTERNAL MEDICINE

## 2022-06-30 PROCEDURE — C1894 INTRO/SHEATH, NON-LASER: HCPCS

## 2022-06-30 PROCEDURE — 2709999900 HC NON-CHARGEABLE SUPPLY

## 2022-06-30 PROCEDURE — 6360000002 HC RX W HCPCS

## 2022-06-30 PROCEDURE — 6360000004 HC RX CONTRAST MEDICATION

## 2022-06-30 PROCEDURE — 93460 R&L HRT ART/VENTRICLE ANGIO: CPT

## 2022-06-30 PROCEDURE — C1769 GUIDE WIRE: HCPCS

## 2022-06-30 PROCEDURE — C1887 CATHETER, GUIDING: HCPCS

## 2022-06-30 RX ORDER — SODIUM CHLORIDE 0.9 % (FLUSH) 0.9 %
5-40 SYRINGE (ML) INJECTION EVERY 12 HOURS SCHEDULED
Status: DISCONTINUED | OUTPATIENT
Start: 2022-06-30 | End: 2022-06-30 | Stop reason: HOSPADM

## 2022-06-30 RX ORDER — DIPHENHYDRAMINE HCL 25 MG
25 TABLET ORAL ONCE
Status: COMPLETED | OUTPATIENT
Start: 2022-06-30 | End: 2022-06-30

## 2022-06-30 RX ORDER — SODIUM CHLORIDE 0.9 % (FLUSH) 0.9 %
5-40 SYRINGE (ML) INJECTION PRN
Status: DISCONTINUED | OUTPATIENT
Start: 2022-06-30 | End: 2022-06-30 | Stop reason: HOSPADM

## 2022-06-30 RX ORDER — MORPHINE SULFATE 2 MG/ML
1 INJECTION, SOLUTION INTRAMUSCULAR; INTRAVENOUS
Status: DISCONTINUED | OUTPATIENT
Start: 2022-06-30 | End: 2022-06-30 | Stop reason: HOSPADM

## 2022-06-30 RX ORDER — ACETAMINOPHEN 325 MG/1
650 TABLET ORAL EVERY 4 HOURS PRN
Status: DISCONTINUED | OUTPATIENT
Start: 2022-06-30 | End: 2022-06-30 | Stop reason: HOSPADM

## 2022-06-30 RX ORDER — SODIUM CHLORIDE 9 MG/ML
INJECTION, SOLUTION INTRAVENOUS CONTINUOUS
Status: DISCONTINUED | OUTPATIENT
Start: 2022-06-30 | End: 2022-06-30 | Stop reason: HOSPADM

## 2022-06-30 RX ORDER — SODIUM CHLORIDE 9 MG/ML
INJECTION, SOLUTION INTRAVENOUS PRN
Status: DISCONTINUED | OUTPATIENT
Start: 2022-06-30 | End: 2022-06-30 | Stop reason: HOSPADM

## 2022-06-30 RX ORDER — DIAZEPAM 5 MG/1
5 TABLET ORAL ONCE
Status: COMPLETED | OUTPATIENT
Start: 2022-06-30 | End: 2022-06-30

## 2022-06-30 RX ORDER — ATROPINE SULFATE 0.4 MG/ML
0.5 AMPUL (ML) INJECTION
Status: DISCONTINUED | OUTPATIENT
Start: 2022-06-30 | End: 2022-06-30 | Stop reason: HOSPADM

## 2022-06-30 RX ADMIN — DIAZEPAM 5 MG: 5 TABLET ORAL at 08:25

## 2022-06-30 RX ADMIN — SODIUM CHLORIDE: 9 INJECTION, SOLUTION INTRAVENOUS at 08:25

## 2022-06-30 RX ADMIN — DIPHENHYDRAMINE HCL 25 MG: 25 TABLET ORAL at 08:25

## 2022-06-30 NOTE — PROGRESS NOTES
Discharge instructions reviewed. Questions answered. Belongings gathered and checked with admission list. PIV removed. Radial site WNL.

## 2022-06-30 NOTE — FLOWSHEET NOTE
Upon entering room, patient sitting up in chair and moaning. Skin warm and dry, breathing even, alert and oriented, denies any pain. Patient states \"this procedure has worn me out today! \" This RN gave emotional support and suggested that maybe she ought to stay in the hospital overnight if she was feeling weak, and that I would notify her physician. Patient states \"I'm not staying overnight! I won't do it. \" This RN re-enforced education regarding returning to ED if instance of bleeding, chest pain, or stroke-like symptoms. Patient verbalized understanding. Patient able to walk to wheelchair without additional distress.

## 2022-06-30 NOTE — H&P
[address] [place] [address], [address] [place] [address]                              HISTORY AND PHYSICAL    PATIENT NAME: Rishabh Francois                     :        1950  MED REC NO:   [id number]                          ROOM:  ACCOUNT NO:   [de-identified]                           ADMIT DATE: 2022  PROVIDER:     Harper Cullen MD    INDICATION:  Heart failure. HISTORY OF PRESENT ILLNESS:  This is a 64-year-old female patient who  has had a remote heart catheterization done about 20 years ago _____. At that time, she was found to have nonobstructive coronary artery  disease present. The patient underwent a stress test recently. Stress  test shows LV dysfunction present. EF is down to 28%. Her baseline is  around 49% in 2019, now it is down to 28%. LV is dilated. Mild to  moderate yaneli-infarct ischemia is noted. Balanced ischemia is also  present. Frequent PVCs were noted. Therefore, she is here for heart  catheterization. The patient does have dyspnea present. Stress test shows ischemia noted, EF is 28%. PAST MEDICAL HISTORY:  History of diastolic dysfunction, congestive  heart failure systolic, hypertension, hyperlipidemia, asthma, COPD  present. She sees Dr. Yue Garcia for that. No history of stroke. No  seizures. No diabetes. PAST SURGICAL HISTORY:  Cataract surgery, colonoscopy done. SOCIAL HISTORY:  Does not smoke. Does not drink. ALLERGIES:  _____ causing cough. MEDICATIONS:  She is on inhalers, Hyzaar 50/12.5 once a day, metoprolol  25 mg once a day, red yeast rice and Singulair. PHYSICAL EXAMINATION:  GENERAL:  The patient is awake, alert, and answering questions, not in  acute distress. VITAL SIGNS:  Temperature afebrile. Pulse is 70. Blood pressure is  139/88. HEENT:  Head is normocephalic and atraumatic. Pupils are equal and  reactive. CHEST:  Equal expansion.   LUNGS:  Clear to auscultation. No wheezing or rhonchi appreciated. HEART:  Regular rhythm. ABDOMEN:  Soft and nontender. Bowel sounds are present. No  hepatosplenomegaly or guarding appreciated. EXTREMITIES:  No cyanosis or clubbing noted. NEUROLOGIC:  Cranial nerves II through II are grossly intact. LABORATORY DATA:  BUN is 15, creatinine 0.7. CBC is normal.    IMPRESSION:  This is a 68-year-old female patient. She is here for  heart catheterization because of abnormal stress test.  The plan is for  heart cath right and left and we will make further recommendations based  on that.         Janessa Ponce MD    D: 06/30/2022 8:30:57       T: 06/30/2022 13:09:18     [id number]  Job#: [id number]     Doc#: [id number]    CC:

## 2022-06-30 NOTE — PROCEDURES
[address] [place] [address], [address] [place] [address]                            CARDIAC CATHETERIZATION    PATIENT NAME: Lise Pagan                     :        1950  MED REC NO:   [id number]                          ROOM:  ACCOUNT NO:   [de-identified]                           ADMIT DATE: 2022  PROVIDER:     Lizzette Benjamin MD    DATE OF PROCEDURE:  2022    PROCEDURE:  Right and left heart catheterization performed. PROCEDURE:  This is a 66-year-old female patient brought to the cath lab  today. Informed consent was obtained from the patient. The patient was  prepped and draped in the usual sterile fashion. The patient was  injected with 5 mL of 2% lidocaine in the right radial and right  brachial vein. A 5-Ugandan sheath was placed in the right brachial vein. 5/6-Ugandan sheath was placed in the right radial artery. [place] catheter was used. Right heart catheterization was performed. Using a TIG catheter, right coronary angiography was performed. The  right coronary angiogram revealed the right coronary artery is a  medium-sized vessel. It is a dominant vessel. Right coronary artery  has mild disease noted. CECILIA-3 flow is noted. Using a TIG catheter, left coronary angiography was performed. The left  coronary angiogram revealed the left main is short and it is patent. It  bifurcates into LAD and circumflex artery. Circ is a large-sized vessel, it gives off a large OM1 branch and OM2  branch. There is mild disease noted. CECILIA-3 flow is present. LAD is a  medium-sized vessel, it reaches and wraps the apex. It gives off a  medium-sized diagonal branch. LAD has mild disease present. Right heart catheterization shows the right RA pressure is 16/14 with a  mean of 13 and RV pressure is 58/11 with a mean of 16, pulmonary  capillary wedge pressure is 30/39 with a mean of 30 present. IMPRESSION:  1.   Left main is short and is patent. 2.  LAD is a medium-sized vessel, reaches and wraps the apex. Diagonal  branch. There is a mild disease note. 3.  Circ is a medium-sized vessel. It gives off OM1 and OM2. Mild  disease noted. 4.  Right coronary artery has mild disease note. 5.  EDP is around 25 mmHg present. The right heart pressures are elevated and pulmonary capillary wedge  mean pressure is around 30 mmHg present and PA mean is 30 mmHg also  present. 1.  The patient has a nonobstructive coronary artery disease present. 2.  The patient has elevated right heart pressure present. PLAN:  Evaluate for mitral regurgitation. We will see as an outpatient,  adjust her medications and probably consider KRISTAL to evaluate mitral  valve. The patient tolerated the procedure well. No complications were noted.     Blood loss 20cc    Delmar Ramirez MD    D: 06/30/2022 10:43:23       T: 06/30/2022 13:16:32     [email or web address]  Job#: [id number]     Doc#: [id number]    CC:

## 2022-06-30 NOTE — OP NOTE
Operative Note      Patient: Jono Thomas  YOB: 1950  MRN: [id number]    Date of Procedure: 6/30/22    Pre-Op Diagnosis: CHEST PAIN AND CHF    Post-Op Diagnosis: Same       Estimated Blood Loss (mL): Minimal    Complications: None      Electronically signed by Denita Parra MD on 6/30/2022 at 10:39 AM    DICTATED [id number]  LEFT MAIN PATENT  LAD MILD DX  LCX MILD DX  RCA  MILD DX  LVEDP 25  RA-16/14/13  RV-58/11/16  PA-MEAN 30  PCWP-30/39/30    NON OBSTRUCTIVE CAD  ELEVATED RIGHT HEART PRESSURES  WILL EVALUATE AS OUTPATIENT FOR MITRAL REGURGITATION   OPTIMIZE MEDICAL TREATMENT  HOME LATER TODAY  RIGHT RADIAL AND BRACHIAL APPROACH  NO COMPLICATIONS    Electronically signed by Denita Parra MD on 6/30/22 at 10:46 AM EDT

## 2022-07-13 ENCOUNTER — INITIAL CONSULT (OUTPATIENT)
Dept: OBGYN | Age: 72
End: 2022-07-13
Payer: MEDICARE

## 2022-07-13 DIAGNOSIS — N89.8 VAGINAL DISCHARGE: ICD-10-CM

## 2022-07-13 DIAGNOSIS — N95.0 PMB (POSTMENOPAUSAL BLEEDING): ICD-10-CM

## 2022-07-13 PROCEDURE — 1123F ACP DISCUSS/DSCN MKR DOCD: CPT | Performed by: OBSTETRICS & GYNECOLOGY

## 2022-07-13 PROCEDURE — 99203 OFFICE O/P NEW LOW 30 MIN: CPT | Performed by: OBSTETRICS & GYNECOLOGY

## 2022-07-13 RX ORDER — CARVEDILOL 6.25 MG/1
6.25 TABLET ORAL 2 TIMES DAILY WITH MEALS
COMMUNITY

## 2022-07-13 ASSESSMENT — PATIENT HEALTH QUESTIONNAIRE - PHQ9
SUM OF ALL RESPONSES TO PHQ9 QUESTIONS 1 & 2: 0
4. FEELING TIRED OR HAVING LITTLE ENERGY: 1
2. FEELING DOWN, DEPRESSED OR HOPELESS: 0
3. TROUBLE FALLING OR STAYING ASLEEP: 0
7. TROUBLE CONCENTRATING ON THINGS, SUCH AS READING THE NEWSPAPER OR WATCHING TELEVISION: 0
SUM OF ALL RESPONSES TO PHQ QUESTIONS 1-9: 2
6. FEELING BAD ABOUT YOURSELF - OR THAT YOU ARE A FAILURE OR HAVE LET YOURSELF OR YOUR FAMILY DOWN: 0
5. POOR APPETITE OR OVEREATING: 1
8. MOVING OR SPEAKING SO SLOWLY THAT OTHER PEOPLE COULD HAVE NOTICED. OR THE OPPOSITE, BEING SO FIGETY OR RESTLESS THAT YOU HAVE BEEN MOVING AROUND A LOT MORE THAN USUAL: 0
1. LITTLE INTEREST OR PLEASURE IN DOING THINGS: 0
9. THOUGHTS THAT YOU WOULD BE BETTER OFF DEAD, OR OF HURTING YOURSELF: 0
SUM OF ALL RESPONSES TO PHQ QUESTIONS 1-9: 2
10. IF YOU CHECKED OFF ANY PROBLEMS, HOW DIFFICULT HAVE THESE PROBLEMS MADE IT FOR YOU TO DO YOUR WORK, TAKE CARE OF THINGS AT HOME, OR GET ALONG WITH OTHER PEOPLE: 0

## 2022-07-13 NOTE — PROGRESS NOTES
7/13/22    Lora Lundberg  1950    Chief Complaint   Patient presents with    New Patient     pt here for pmb on and off x 2 yrs, small-large amount, bright red with clots. states had pap @ physicans and surgeons 4/25/22-normal.  small amount of discharge today.          Lora Lundberg is a 74 y.o. female who presents today for evaluation of PMB    Past Medical History:   Diagnosis Date    Anemia     Arthritis     Right Hip    Asthma     Cardiomyopathy, nonischemic ([address]     hypertensive type    COPD (chronic obstructive pulmonary disease) ([address] 2014    per PFT 2014    Edema     Essential hypertension     History of blood transfusion     Hyperlipidemia     Major depressive disorder, single episode, unspecified     Mitral valve regurgitation     Obesity     Obstructive sleep apnea on CPAP     Osteopenia     PMB (postmenopausal bleeding)     Reflux esophagitis     Type 2 diabetes mellitus without complication (HCC)     Vaginal discharge        Past Surgical History:   Procedure Laterality Date    CARDIAC CATHETERIZATION      X 2 - Dr. Mcmanus Falling; No interventions    CATARACT REMOVAL      COLONOSCOPY      COLONOSCOPY  10/20/2014    colon polyp, internal hemorrhoids    DILATION AND CURETTAGE OF UTERUS      X 2    EYE SURGERY  8years old   [facility] WISDOM TOOTH EXTRACTION  as a child       Social History     Tobacco Use    Smoking status: Never Smoker    Smokeless tobacco: Never Used   Vaping Use    Vaping Use: Never used   Substance Use Topics    Alcohol use: Not Currently     Alcohol/week: 0.0 standard drinks     Comment: Occassional    Drug use: No       Family History   Adopted: Yes   Problem Relation Age of Onset    Heart Disease Mother     Other Father         killed in the [address] [place] [address] remains found    No Known Problems Sister     No Known Problems Brother        Current Outpatient Medications   Medication Sig Dispense Refill    empagliflozin (JARDIANCE) 10 MG tablet Take 10 mg by mouth daily      carvedilol (COREG) 6.25 MG tablet Take 6.25 mg by mouth 2 times daily (with meals)      escitalopram (LEXAPRO) 10 MG tablet Take 1 tablet by mouth daily 90 tablet 1    simvastatin (ZOCOR) 20 MG tablet Take 1 tablet by mouth every evening 90 tablet 1    montelukast (SINGULAIR) 10 MG tablet Take 1 tablet by mouth nightly 90 tablet 3    Coenzyme Q10 (COQ10 PO) Take 1 capsule by mouth daily       Potassium 99 MG TABS Take 1 tablet by mouth daily Indications: OTC      umeclidinium-vilanterol (ANORO ELLIPTA) 62.5-25 MCG/INH AEPB inhaler Inhale 1 puff into the lungs daily 1 each 5    Calcium Carb-Cholecalciferol 600-800 MG-UNIT CHEW DAILY      albuterol sulfate  (90 Base) MCG/ACT inhaler INHALE 2 PUFFS INTO THE LUNGS EVERY 6 HOURS AS NEEDED FOR WHEEZING 6.7 g 1    diphenhydrAMINE HCl (SLEEP AID) 50 MG/30ML LIQD Take by mouth      fluticasone (FLONASE) 50 MCG/ACT nasal spray SHAKE LQ AND U 2 SPRAYS IEN QD  0    losartan-hydrochlorothiazide (HYZAAR) 50-12.5 MG per tablet       cephALEXin (KEFLEX) 500 MG capsule Take 1 capsule by mouth 4 times daily (Patient not taking: Reported on 6/30/2022) 20 capsule 0    metoprolol succinate (TOPROL XL) 25 MG extended release tablet TK 1 T PO QD  11     No current facility-administered medications for this visit. Allergies   Allergen Reactions    Latex Rash    Norvasc [Amlodipine Besylate]     Ace Inhibitors      Cough    Soybean-Containing Drug Products        No obstetric history on file.     Immunization History   Administered Date(s) Administered    COVID-19, PFIZER PURPLE top, DILUTE for use, (age 15 y+), 30mcg/0.3mL 02/02/2021, 02/23/2021, 11/23/2021    Influenza Virus Vaccine 11/12/2014, 09/26/2019    Influenza, Triv, 3 Years and older, IM (Afluria (5 yrs and older) 12/27/2018    Tdap (Boostrix, Adacel) 10/01/2017    Zoster Recombinant (Shingrix) 09/26/2019       Review of Systems  All other systems reviewed and are negative    There were no vitals taken for this visit. Physical Exam  Nursing note reviewed. HENT:      Head: Normocephalic. Nose: Nose normal.   Eyes:      Extraocular Movements: Extraocular movements intact. Pulmonary:      Effort: Pulmonary effort is normal.   Abdominal:      General: Abdomen is flat. Palpations: Abdomen is soft. Musculoskeletal:      Cervical back: Normal range of motion. Skin:     General: Skin is warm. Neurological:      Mental Status: She is alert. No results found for this visit on 07/13/22. ASSESSMENT AND PLAN   Diagnosis Orders   1. PMB (postmenopausal bleeding)  US NON OB TRANSVAGINAL   2. Vaginal discharge       HD  Return for follow up appointment, surgery, ultrasound.     Adalgisa Adorno MD

## 2022-07-14 ENCOUNTER — OFFICE VISIT (OUTPATIENT)
Dept: INTERNAL MEDICINE CLINIC | Age: 72
End: 2022-07-14
Payer: MEDICARE

## 2022-07-14 VITALS
HEIGHT: 60 IN | SYSTOLIC BLOOD PRESSURE: 112 MMHG | WEIGHT: 228 LBS | DIASTOLIC BLOOD PRESSURE: 80 MMHG | HEART RATE: 58 BPM | OXYGEN SATURATION: 94 % | BODY MASS INDEX: 44.76 KG/M2

## 2022-07-14 DIAGNOSIS — E11.9 TYPE 2 DIABETES MELLITUS WITHOUT COMPLICATION, WITH LONG-TERM CURRENT USE OF INSULIN (HCC): Primary | ICD-10-CM

## 2022-07-14 DIAGNOSIS — F32.A DEPRESSION, UNSPECIFIED DEPRESSION TYPE: ICD-10-CM

## 2022-07-14 DIAGNOSIS — I25.10 CORONARY ARTERY DISEASE INVOLVING NATIVE CORONARY ARTERY OF NATIVE HEART WITHOUT ANGINA PECTORIS: ICD-10-CM

## 2022-07-14 DIAGNOSIS — E78.5 HYPERLIPIDEMIA, UNSPECIFIED HYPERLIPIDEMIA TYPE: ICD-10-CM

## 2022-07-14 DIAGNOSIS — I38 VHD (VALVULAR HEART DISEASE): ICD-10-CM

## 2022-07-14 DIAGNOSIS — Z79.4 TYPE 2 DIABETES MELLITUS WITHOUT COMPLICATION, WITH LONG-TERM CURRENT USE OF INSULIN (HCC): Primary | ICD-10-CM

## 2022-07-14 DIAGNOSIS — I10 ESSENTIAL HYPERTENSION: ICD-10-CM

## 2022-07-14 PROCEDURE — 3044F HG A1C LEVEL LT 7.0%: CPT | Performed by: FAMILY MEDICINE

## 2022-07-14 PROCEDURE — 1123F ACP DISCUSS/DSCN MKR DOCD: CPT | Performed by: FAMILY MEDICINE

## 2022-07-14 PROCEDURE — 99214 OFFICE O/P EST MOD 30 MIN: CPT | Performed by: FAMILY MEDICINE

## 2022-07-14 ASSESSMENT — ENCOUNTER SYMPTOMS
ABDOMINAL PAIN: 0
COUGH: 0
SHORTNESS OF BREATH: 0
NAUSEA: 0

## 2022-07-14 NOTE — PROGRESS NOTES
Juliette eBrman (:  1950) is a 74 y.o. female,established patient, here for evaluation of the following chief complaint(s):  Follow-up, Diabetes, Depression, and Hyperlipidemia         ASSESSMENT/PLAN:  1. Type 2 diabetes mellitus without complication, with long-term current use of insulin ([facility] chronic  - Hemoglobin A1C; Future  Continue Jardiance    2. Depression, unspecified depression type, chronic  Continue Lexapro    3. Hyperlipidemia, unspecified hyperlipidemia type  Continue Simvastatin  The patient is asked to make an attempt to improve diet and exercise patterns     4. Essential hypertension  Continue medications    5. Coronary artery disease involving native coronary artery of native heart without angina pectoris    6. VHD (valvular heart disease)  Patient to have a KRISTAL  Keep f/u with cardiology    On this date 2022 I have spent 30 minutes reviewing previous notes, test results and face to face with the patient discussing the diagnosis and importance of compliance with the treatment plan as well as documenting on the day of the visit. Return for Follow up in 4 to 5 months for DM. HTN. HLD.        Lab Results   Component Value Date    WBC 8.1 2022    HGB 12.7 2022    HCT 41.2 2022    MCV 87.7 2022     2022     Lab Results   Component Value Date    CHOL 159 2022     Lab Results   Component Value Date    TRIG 176 (H) 2022     Lab Results   Component Value Date    HDL 50 2022     Lab Results   Component Value Date    LDLCALC 74 2022    LDLDIRECT 113 (H) 06/15/2020     Lab Results   Component Value Date    LABA1C 6.6 (H) 2022     Lab Results   Component Value Date     2022     Lab Results   Component Value Date     2022    K 4.4 2022     2022    CO2 27 2022    BUN 15 2022    CREATININE 0.7 2022    GLUCOSE 122 (H) 2022    CALCIUM 9.5 2022    PROT 6.5 06/09/2022    LABALBU 4.3 06/09/2022    BILITOT 0.3 06/09/2022    ALKPHOS 97 06/09/2022    AST 17 06/09/2022    ALT 18 06/09/2022    LABGLOM >60 06/27/2022    GFRAA >60 06/27/2022         Subjective   SUBJECTIVE/OBJECTIVE:    HISTORY OF PRESENT ILLNESS:  This is a 74 y.o. female here for the following:  Patient Active Problem List    Diagnosis Date Noted    PMB (postmenopausal bleeding)     Vaginal discharge     Hyperlipidemia 09/19/2020    Cardiomyopathy, nonischemic ([facility]     Essential hypertension     Type 2 diabetes mellitus without complication, with long-term current use of insulin ([facility] 09/14/2020    Depression 09/14/2020    YOLANDA on CPAP 02/20/2017    Centrilobular emphysema ([facility] 10/22/2015    Mild intermittent asthma without complication 07/56/0388      DM II- On Jardiance per cardiology due to her heart. Last Hba1c 6.6 controlled  HLD- On Simvastatin  Depression- On Lexapro 10 mg  VHD- moderate to severe MR, EF 26 %-Patient was seen by cardiology. Pt to have a KRISTAL  CAD-LHC- nonobstructive CAD, elevated right heart pressure present  HTN- stable on medications  PMB- she was seen by Gyn and had a repeat pelvic Us      Review of Systems   Constitutional:  Negative for diaphoresis and fever. Respiratory:  Negative for cough and shortness of breath. Cardiovascular:  Negative for chest pain and palpitations. Gastrointestinal:  Negative for abdominal pain and nausea. Genitourinary:  Negative for difficulty urinating. Neurological:  Negative for dizziness and headaches. Psychiatric/Behavioral:  Negative for dysphoric mood.       Allergies   Allergen Reactions    Latex Rash    Norvasc [Amlodipine Besylate]     Ace Inhibitors      Cough    Soybean-Containing Drug Products      Current Outpatient Medications   Medication Sig Dispense Refill    NONFORMULARY Sleep aide(Equate)      empagliflozin (JARDIANCE) 10 MG tablet Take 10 mg by mouth daily      carvedilol (COREG) 6.25 MG tablet Take 6.25 mg by mouth 2 times daily (with meals)      escitalopram (LEXAPRO) 10 MG tablet Take 1 tablet by mouth daily 90 tablet 1    simvastatin (ZOCOR) 20 MG tablet Take 1 tablet by mouth every evening 90 tablet 1    montelukast (SINGULAIR) 10 MG tablet Take 1 tablet by mouth nightly 90 tablet 3    Coenzyme Q10 (COQ10 PO) Take 1 capsule by mouth daily       Potassium 99 MG TABS Take 1 tablet by mouth daily Indications: OTC      umeclidinium-vilanterol (ANORO ELLIPTA) 62.5-25 MCG/INH AEPB inhaler Inhale 1 puff into the lungs daily 1 each 5    Calcium Carb-Cholecalciferol 600-800 MG-UNIT CHEW DAILY      albuterol sulfate  (90 Base) MCG/ACT inhaler INHALE 2 PUFFS INTO THE LUNGS EVERY 6 HOURS AS NEEDED FOR WHEEZING 6.7 g 1    losartan-hydrochlorothiazide (HYZAAR) 50-12.5 MG per tablet        No current facility-administered medications for this visit. Vitals:    07/14/22 1436   BP: 112/80   Site: Right Upper Arm   Position: Sitting   Cuff Size: Medium Adult   Pulse: 58   SpO2: 94%   Weight: 228 lb (103.4 kg)   Height: 5' (1.524 m)     Objective   Physical Exam  Vitals reviewed. Constitutional:       General: She is not in acute distress. Eyes:      Extraocular Movements: Extraocular movements intact. Cardiovascular:      Rate and Rhythm: Normal rate and regular rhythm. Pulmonary:      Effort: Pulmonary effort is normal. No respiratory distress. Breath sounds: Normal breath sounds. Abdominal:      Palpations: Abdomen is soft. Tenderness: There is no abdominal tenderness. Musculoskeletal:      Cervical back: Neck supple. Right lower leg: Edema present. Left lower leg: Edema present. Neurological:      Mental Status: She is alert and oriented to person, place, and time. Psychiatric:         Mood and Affect: Mood normal.              An electronic signature was used to authenticate this note. --Aparna Ricci,      This dictation was generated by voice recognition computer software.   Although

## 2022-07-27 ENCOUNTER — HOSPITAL ENCOUNTER (OUTPATIENT)
Dept: NON INVASIVE DIAGNOSTICS | Age: 72
Discharge: HOME | End: 2022-07-27
Payer: MEDICARE

## 2022-07-27 VITALS
HEART RATE: 57 BPM | RESPIRATION RATE: 18 BRPM | SYSTOLIC BLOOD PRESSURE: 118 MMHG | OXYGEN SATURATION: 98 % | DIASTOLIC BLOOD PRESSURE: 67 MMHG

## 2022-07-27 LAB
LV EF: 35 %
LVEF MODALITY: NORMAL

## 2022-07-27 PROCEDURE — 93312 ECHO TRANSESOPHAGEAL: CPT

## 2022-07-27 PROCEDURE — 7100000001 HC PACU RECOVERY - ADDTL 15 MIN

## 2022-07-27 PROCEDURE — 7100000000 HC PACU RECOVERY - FIRST 15 MIN

## 2022-07-27 NOTE — H&P
[address] [place] [address], [address] [facility]                              HISTORY AND PHYSICAL    PATIENT NAME: Diego Luu                     :        1950  MED REC NO:   [id number]                          ROOM:  ACCOUNT NO:   [de-identified]                           ADMIT DATE: 2022  PROVIDER:     Brook Garcia MD    INDICATION:  The patient is here for KRISTAL. HISTORY OF PRESENT ILLNESS:  This is a 66-year-old female patient who  underwent a heart catheterization. Her heart catheterization showed  that left main was patent. LAD was a medium size vessel, it reaches and  wraps the apex. Mild disease is noted. Circumflex has mild disease  noted, RCA has mild disease noted. She was found to have elevated right  heart pressures noted. She is referred for KRISTAL because she had mitral  regurgitation noted. The patient has no complaints at this time present. Her EF is 28%. PAST MEDICAL HISTORY:  She is having systolic heart failure present. EF  is 28%. Hypertension, hyperlipidemia, mitral regurgitation, asthma,  COPD. She sees Dr. Thi Silverio for her lung issues. No history of  stroke, no diabetes, and no seizures present. PAST SURGICAL HISTORY:  Cataract surgery, colonoscopy. Heart  catheterization done in 2022. CURRENT MEDICATIONS:  She is on Jardiance, Coreg, Lexapro, Zocor, and  titrating her medications for heart failure. Losartan and  hydrochlorothiazide. ALLERGIES:  NORVASC, LATEX, _____, ACE INHIBITORS. PHYSICAL EXAMINATION:  GENERAL:  The patient is awake, alert, and answered questions, appears  in no acute distress. VITAL SIGNS:  Temperature is afebrile, pulse is 65, blood pressure is  135/70. HEENT:  Head is atraumatic. Pupils are equal and reactive to light. CHEST:  Equal to expansion. LUNGS:  Clear to auscultation. No wheezing or rhonchi appreciated.   CARDIOVASCULAR:  Regular rhythm. ABDOMEN:  Soft and nontender. Bowel sounds are present. No  hepatosplenomegaly or guarding appreciated. EXTREMITIES: No cyanosis or clubbing noted. NEUROLOGIC:  Cranial nerves are grossly intact. IMPRESSION:  This is a 68-year-old female patient who has a history of  nonischemic cardiomyopathy present. PLAN:  The plan is to have routine visit. She is here for KRISTAL to  evaluate for mitral regurgitation. ASA is III. Mallampati is III.         Janessa Ponce MD    D: 07/27/2022 9:03:56       T: 07/27/2022 11:14:16     [email or web address]  Job#: [id number]     Doc#: [id number]    CC:

## 2022-07-27 NOTE — PROCEDURES
[address] [place] [address], [address] [place] [address]                                 ECHOCARDIOGRAM    PATIENT NAME: Padma Renee                     :        1950  MED REC NO:   [id number]                          ROOM:  ACCOUNT NO:   [de-identified]                           ADMIT DATE: 2022  PROVIDER:     Denita Parra MD    KRISTAL    INDICATION:  Mitral regurgitation. This is a 20-year-old female patient, who is brought to noninvasive lab. Informed consent was obtained from the patient. The patient's posterior  oropharynx was anesthetized using lidocaine gel. A mouthguard was  placed. The patient received 5 mg of Versed and 50 mcg of fentanyl. KRISTAL probe was advanced to the posterior oropharynx and mid esophagus. Images were obtained. Left atrium is moderately enlarged. No clot or thrombus noted in the  left atrium or left atrial appendage. Mitral valve leaflets appear  normal.  There is a central jet regurgitation of moderate mitral  regurgitation noted in the central jet. The annulus is dilated. No  mitral annular calcification noted. No ruptured chordae noted. LV  dysfunction is present. EF is around 30-40% range present. No  pericardial effusion noted. No ASD or PFO noted. Color Doppler and  bubble study both were negative. Tricuspid valve is normal.  Pulmonic  valve is normal.  Aortic valve is normal.  Ascending aorta is normal.    IMPRESSION:  1. Left atrium is moderately enlarged. 2.  No clot or thrombus noted in the left atrium or left atrial  appendage. 3.  Central mitral regurgitation noted, moderate mitral regurgitation  noted with central jet, single jet present due to LV dysfunction and  mitral _____ dilatation present. 4.  No ASD or PFO noted. 5.  Tricuspid valve, pulmonic valve and aortic valve is normal.    PLAN:  1. The plan at this time is to optimize medical treatment.   2.  QRS is not wide, so I am not sure if she will tolerate BiV AICD. 3.  Continue medical treatment for heart failure. The patient tolerated the procedure well. No complications noted.         Delmar Ramirez MD    D: 07/27/2022 10:32:48       T: 07/27/2022 12:05:48     [email or web address]  Job#: [id number]     Doc#: [id number]    CC:

## 2022-08-05 NOTE — PROGRESS NOTES
Received cardiac clearance, able to schedule patient. Called patient to schedule.  Left message on voice mail

## 2022-08-16 NOTE — PROGRESS NOTES
left a message on pt's mobile phone encouraging pt. to call back to complete PAT phone assessment and to go over pre-op instructions. PAT number given.

## 2022-08-17 ENCOUNTER — ANESTHESIA EVENT (OUTPATIENT)
Dept: OPERATING ROOM | Age: 72
End: 2022-08-17
Payer: MEDICARE

## 2022-08-17 ASSESSMENT — COPD QUESTIONNAIRES: CAT_SEVERITY: MODERATE

## 2022-08-17 NOTE — ANESTHESIA PRE PROCEDURE
Department of Anesthesiology  Preprocedure Note       Name:  Braxton Agustin   Age:  74 y.o.  :  1950                                          MRN:  [id number]         Date:  2022      Surgeon: Joey Phillips):  Miguel Merino MD    Procedure: Procedure(s):  DILATATION AND CURETTAGE HYSTEROSCOPY    Medications prior to admission:   Prior to Admission medications    Medication Sig Start Date End Date Taking? Authorizing Provider   umeclidinium-vilanterol (ANORO ELLIPTA) 62.5-25 MCG/INH AEPB inhaler Inhale 1 puff into the lungs in the morning. 22   Cristal Miles MD   NONFORMULARY Sleep aide(Equate)    Historical Provider, MD   empagliflozin (JARDIANCE) 10 MG tablet Take 10 mg by mouth daily    Historical Provider, MD   carvedilol (COREG) 6.25 MG tablet Take 6.25 mg by mouth 2 times daily (with meals)    Historical Provider, MD   escitalopram (LEXAPRO) 10 MG tablet Take 1 tablet by mouth daily 22   Grzegorz Dykes DO   simvastatin (ZOCOR) 20 MG tablet Take 1 tablet by mouth every evening 22   Grzegorz Dykes DO   montelukast (SINGULAIR) 10 MG tablet Take 1 tablet by mouth nightly 21   Cristal Miles MD   Coenzyme Q10 (COQ10 PO) Take 1 capsule by mouth daily     Historical Provider, MD   Potassium 99 MG TABS Take 1 tablet by mouth daily Indications: OTC    Historical Provider, MD   Calcium Carb-Cholecalciferol 600-800 MG-UNIT [address] 3/5/21   Historical Provider, MD   albuterol sulfate  (90 Base) MCG/ACT inhaler INHALE 2 PUFFS INTO THE LUNGS EVERY 6 HOURS AS NEEDED FOR WHEEZING 3/17/21   Clemente Mac MD   losartan-hydrochlorothiazide (HYZAAR) 50-12.5 MG per tablet  18   Historical Provider, MD       Current medications:    No current facility-administered medications for this encounter.      Current Outpatient Medications   Medication Sig Dispense Refill    umeclidinium-vilanterol (ANORO ELLIPTA) 62.5-25 MCG/INH AEPB inhaler Inhale 1 puff into the lungs in the morning. 1 each 5    NONFORMULARY Sleep aide(Equate)      empagliflozin (JARDIANCE) 10 MG tablet Take 10 mg by mouth daily      carvedilol (COREG) 6.25 MG tablet Take 6.25 mg by mouth 2 times daily (with meals)      escitalopram (LEXAPRO) 10 MG tablet Take 1 tablet by mouth daily 90 tablet 1    simvastatin (ZOCOR) 20 MG tablet Take 1 tablet by mouth every evening 90 tablet 1    montelukast (SINGULAIR) 10 MG tablet Take 1 tablet by mouth nightly 90 tablet 3    Coenzyme Q10 (COQ10 PO) Take 1 capsule by mouth daily       Potassium 99 MG TABS Take 1 tablet by mouth daily Indications: OTC      Calcium Carb-Cholecalciferol 600-800 MG-UNIT CHEW DAILY      albuterol sulfate  (90 Base) MCG/ACT inhaler INHALE 2 PUFFS INTO THE LUNGS EVERY 6 HOURS AS NEEDED FOR WHEEZING 6.7 g 1    losartan-hydrochlorothiazide (HYZAAR) 50-12.5 MG per tablet          Allergies: Allergies   Allergen Reactions    Latex Rash    Norvasc [Amlodipine Besylate]     Ace Inhibitors      Cough    Soybean-Containing Drug Products        Problem List:    Patient Active Problem List   Diagnosis Code    Centrilobular emphysema (HCC) J43.2    Mild intermittent asthma without complication A52.09    YOLANDA on CPAP G47.33, Z99.89    Type 2 diabetes mellitus without complication, with long-term current use of insulin (HCC) E11.9, Z79.4    Depression F32. A    Hyperlipidemia E78.5    Cardiomyopathy, nonischemic (HCC) I42.8    Essential hypertension I10    PMB (postmenopausal bleeding) N95.0    Vaginal discharge N89.8       Past Medical History:        Diagnosis Date    Anemia     Arthritis     Right Hip    Asthma     Cardiomyopathy, nonischemic ([address]     hypertensive type    COPD [facility]ch[facility] obstructive [facility] disease) [facility] 2014    per PFT 2014    Edema     Essential hypertension     History of blood transfusion     Hyperlipidemia     Major depressive disorder, single episode, unspecified     Mitral valve regurgitation     Obesity     Obstructive sleep apnea on CPAP     Osteopenia     PMB (postmenopausal bleeding)     Reflux esophagitis     Type 2 diabetes mellitus without complication (HCC)     Vaginal discharge        Past Surgical History:        Procedure Laterality Date    CARDIAC CATHETERIZATION      X 2 - Dr. Shanna Pitt; No interventions    CATARACT REMOVAL      COLONOSCOPY      COLONOSCOPY  10/20/2014    colon polyp, internal hemorrhoids    DILATION AND CURETTAGE OF UTERUS      X 2    EYE SURGERY  8years old   [facility] WISDOM TOOTH EXTRACTION  as a child       Social History:    Social History     Tobacco Use    Smoking status: Never    Smokeless tobacco: Never   Substance Use Topics    Alcohol use: Not Currently     Alcohol/week: 0.0 standard drinks     Comment: Occassional                                Counseling given: Not Answered      Vital Signs (Current): There were no vitals filed for this visit.                                            BP Readings from Last 3 Encounters:   07/27/22 118/67   07/14/22 112/80   06/30/22 (!) 149/89       NPO Status:                                                                                 BMI:   Wt Readings from Last 3 Encounters:   08/01/22 228 lb (103.4 kg)   07/14/22 228 lb (103.4 kg)   06/30/22 230 lb (104.3 kg)     There is no height or weight on file to calculate BMI.    CBC:   Lab Results   Component Value Date/Time    WBC 8.1 06/27/2022 09:16 AM    RBC 4.70 06/27/2022 09:16 AM    HGB 12.7 06/27/2022 09:16 AM    HCT 41.2 06/27/2022 09:16 AM    MCV 87.7 06/27/2022 09:16 AM    RDW 13.8 06/27/2022 09:16 AM     06/27/2022 09:16 AM       CMP:   Lab Results   Component Value Date/Time     06/27/2022 09:16 AM    K 4.4 06/27/2022 09:16 AM     06/27/2022 09:16 AM    CO2 27 06/27/2022 09:16 AM    BUN 15 06/27/2022 09:16 AM    CREATININE 0.7 06/27/2022 09:16 AM    GFRAA >60 06/27/2022 09:16 AM    LABGLOM >60 06/27/2022 09:16 AM    GLUCOSE 122 06/27/2022 09:16 AM    PROT 6.5 06/09/2022 10:01 AM    PROT 6.6 06/15/2012 08:46 AM    CALCIUM 9.5 06/27/2022 09:16 AM    BILITOT 0.3 06/09/2022 10:01 AM    ALKPHOS 97 06/09/2022 10:01 AM    AST 17 06/09/2022 10:01 AM    ALT 18 06/09/2022 10:01 AM       POC Tests: No results for input(s): POCGLU, POCNA, POCK, POCCL, POCBUN, POCHEMO, POCHCT in the last 72 hours. Coags:   Lab Results   Component Value Date/Time    PROTIME 11.3 06/27/2022 09:16 AM    INR 0.88 06/27/2022 09:16 AM    APTT 31.3 06/27/2022 09:16 AM       HCG (If Applicable): No results found for: PREGTESTUR, PREGSERUM, HCG, HCGQUANT     ABGs: No results found for: PHART, PO2ART, RER9ISI, AYT8FGQ, BEART, F2CONXMX     Type & Screen (If Applicable):  No results found for: LABABO, LABRH    Drug/Infectious Status (If Applicable):  Lab Results   Component Value Date/Time    HEPCAB NON REACTIVE 01/15/2018 10:14 AM       COVID-19 Screening (If Applicable): No results found for: COVID19        Anesthesia Evaluation  Patient summary reviewed  Airway:           Dental:          Pulmonary:   (+) COPD: moderate,  sleep apnea: on CPAP,  asthma:                            Cardiovascular:  Exercise tolerance: poor (<4 METS),   (+) hypertension:, valvular problems/murmurs: MR, CHF:, LEDBETTER:,             Echocardiogram reviewed               ROS comment: D/W Dr Keo Isabel, Clears for surgery,  NICM  Keep IVF low, Tx pressors    Her EF is 28%.     PAST MEDICAL HISTORY:  She is having systolic heart failure present. EF  is 28%. Hypertension, hyperlipidemia, mitral regurgitation, asthma,  COPD. She sees Dr. Connie Navarro for her lung issues. No history of  stroke, no diabetes, and no seizures present. Conclusions      Summary   Left ventricular systolic function is abnormal.   No evidence of thrombus within the left atrial appendage. Negative bubble study; no ASD or PFO noted. Severe mitral regurgitation.  central jet   EF around 30-40% range   AV is normal trileaflet   No

## 2022-08-17 NOTE — PROGRESS NOTES
.Surgery @ [facility] on 8/23/22 you will be called 8/22/22 with times               1. Do not eat or drink anything after midnight - unless instructed by your doctor prior to surgery. This includes                   no water, ice chips, chewing gum or mints. 2. Follow your directions as prescribed by the doctor for your procedure and medications. May take your carvedilol the morning of the procedure with a tiny sip of water. Use your inhalers the morning of the procedure and bring them with you the day of your procedure. Also bring your cpap with you. 3. Check with your Doctor regarding stopping vitamins, supplements, blood thinners (Plavix, Coumadin, Lovenox, Effient, Pradaxa, Xarelto, Fragmin or                   other blood thinners) and follow their instructions. Stop vitamins, supplements and NSAIDS:    4. Do not smoke, vape or use chewing tobacco morning of surgery. Do not drink any alcoholic beverages 24 hours prior to surgery. This includes NA Beer. No street drugs 7 days prior to surgery. 5. You may brush your teeth and gargle the morning of surgery. DO NOT SWALLOW WATER   6. You MUST make arrangements for a responsible adult to take you home after your surgery and be able to check on you every couple                   hours for the day. You will not be allowed to leave alone or drive yourself home. It is strongly suggested someone stay with you the first 24                   hrs. Your surgery will be cancelled if you do not have a ride home. 7. Please wear simple, loose fitting clothing to the hospital.  Valeria Garcia not bring valuables (money, credit cards, checkbooks, etc.) Do not wear any                   makeup (including no eye makeup) or nail polish on your fingers or toes. 8. DO NOT wear any jewelry or piercings on day of surgery. All body piercing jewelry must be removed.              9. If you have dentures, they will be removed before going to the OR; we will provide you a container. If you wear contact lenses or glasses,                  they will be removed; please bring a case for them. 10. If you  have a Living Will and Durable Power of  for Healthcare, please bring in a copy. 11. Please bring picture ID,  insurance card, paperwork from the doctors office    (H & P, Consent, & card for implantable devices). 12. Take a shower the morning of your procedure with Hibiclens or an anti-bacterial soap. Do not apply any make-up, deodorant, lotion, oil or powder. 13.  Enter thru the main entrance wearing a mask on the day of surgery.

## 2022-08-18 ENCOUNTER — OFFICE VISIT (OUTPATIENT)
Dept: OBGYN | Age: 72
End: 2022-08-18
Payer: MEDICARE

## 2022-08-18 VITALS — DIASTOLIC BLOOD PRESSURE: 73 MMHG | BODY MASS INDEX: 44.14 KG/M2 | SYSTOLIC BLOOD PRESSURE: 114 MMHG | WEIGHT: 226 LBS

## 2022-08-18 DIAGNOSIS — N95.0 PMB (POSTMENOPAUSAL BLEEDING): Primary | ICD-10-CM

## 2022-08-18 PROCEDURE — 99213 OFFICE O/P EST LOW 20 MIN: CPT | Performed by: OBSTETRICS & GYNECOLOGY

## 2022-08-18 PROCEDURE — 1123F ACP DISCUSS/DSCN MKR DOCD: CPT | Performed by: OBSTETRICS & GYNECOLOGY

## 2022-08-23 ENCOUNTER — HOSPITAL ENCOUNTER (OUTPATIENT)
Age: 72
Setting detail: OUTPATIENT SURGERY
Discharge: HOME | End: 2022-08-23
Attending: OBSTETRICS & GYNECOLOGY | Admitting: OBSTETRICS & GYNECOLOGY
Payer: MEDICARE

## 2022-08-23 ENCOUNTER — ANESTHESIA (OUTPATIENT)
Dept: OPERATING ROOM | Age: 72
End: 2022-08-23
Payer: MEDICARE

## 2022-08-23 VITALS
HEART RATE: 60 BPM | RESPIRATION RATE: 18 BRPM | OXYGEN SATURATION: 96 % | SYSTOLIC BLOOD PRESSURE: 149 MMHG | TEMPERATURE: 97.3 F | DIASTOLIC BLOOD PRESSURE: 80 MMHG

## 2022-08-23 DIAGNOSIS — N89.8 DISCHARGE OF VAGINA: ICD-10-CM

## 2022-08-23 DIAGNOSIS — N95.0 POSTMENOPAUSAL BLEEDING: ICD-10-CM

## 2022-08-23 DIAGNOSIS — N95.0 PMB (POSTMENOPAUSAL BLEEDING): Primary | ICD-10-CM

## 2022-08-23 LAB
ANION GAP SERPL CALCULATED.3IONS-SCNC: 9 MMOL/L (ref 4–16)
BASOPHILS ABSOLUTE: 0 K/CU MM
BASOPHILS RELATIVE PERCENT: 0.3 % (ref 0–1)
BUN BLDV-MCNC: 14 MG/DL (ref 6–23)
CALCIUM SERPL-MCNC: 9.5 MG/DL (ref 8.3–10.6)
CHLORIDE BLD-SCNC: 101 MMOL/L (ref 99–110)
CO2: 31 MMOL/L (ref 21–32)
CREAT SERPL-MCNC: 0.6 MG/DL (ref 0.6–1.1)
DIFFERENTIAL TYPE: ABNORMAL
EOSINOPHILS ABSOLUTE: 0.2 K/CU MM
EOSINOPHILS RELATIVE PERCENT: 3.8 % (ref 0–3)
GFR AFRICAN AMERICAN: >60 ML/MIN/1.73M2
GFR NON-AFRICAN AMERICAN: >60 ML/MIN/1.73M2
GLUCOSE BLD-MCNC: 116 MG/DL (ref 70–99)
GLUCOSE BLD-MCNC: 128 MG/DL (ref 70–99)
HCT VFR BLD CALC: 40.2 % (ref 37–47)
HEMOGLOBIN: 12.7 GM/DL (ref 12.5–16)
IMMATURE NEUTROPHIL %: 0.3 % (ref 0–0.43)
LYMPHOCYTES ABSOLUTE: 1.6 K/CU MM
LYMPHOCYTES RELATIVE PERCENT: 24.4 % (ref 24–44)
MCH RBC QN AUTO: 27.6 PG (ref 27–31)
MCHC RBC AUTO-ENTMCNC: 31.6 % (ref 32–36)
MCV RBC AUTO: 87.4 FL (ref 78–100)
MONOCYTES ABSOLUTE: 0.4 K/CU MM
MONOCYTES RELATIVE PERCENT: 5.7 % (ref 0–4)
NUCLEATED RBC %: 0 %
PDW BLD-RTO: 14.4 % (ref 11.7–14.9)
PLATELET # BLD: 199 K/CU MM (ref 140–440)
PMV BLD AUTO: 8.9 FL (ref 7.5–11.1)
POTASSIUM SERPL-SCNC: 3.9 MMOL/L (ref 3.5–5.1)
PREGNANCY TEST URINE, POC: NEGATIVE
RBC # BLD: 4.6 M/CU MM (ref 4.2–5.4)
SEGMENTED NEUTROPHILS ABSOLUTE COUNT: 4.2 K/CU MM
SEGMENTED NEUTROPHILS RELATIVE PERCENT: 65.5 % (ref 36–66)
SODIUM BLD-SCNC: 141 MMOL/L (ref 135–145)
TOTAL IMMATURE NEUTOROPHIL: 0.02 K/CU MM
TOTAL NUCLEATED RBC: 0 K/CU MM
WBC # BLD: 6.3 K/CU MM (ref 4–10.5)

## 2022-08-23 PROCEDURE — 7100000001 HC PACU RECOVERY - ADDTL 15 MIN: Performed by: OBSTETRICS & GYNECOLOGY

## 2022-08-23 PROCEDURE — 85025 COMPLETE CBC W/AUTO DIFF WBC: CPT

## 2022-08-23 PROCEDURE — 3700000001 HC ADD 15 MINUTES (ANESTHESIA): Performed by: OBSTETRICS & GYNECOLOGY

## 2022-08-23 PROCEDURE — 80048 BASIC METABOLIC PNL TOTAL CA: CPT

## 2022-08-23 PROCEDURE — 3600000003 HC SURGERY LEVEL 3 BASE: Performed by: OBSTETRICS & GYNECOLOGY

## 2022-08-23 PROCEDURE — 58558 HYSTEROSCOPY BIOPSY: CPT | Performed by: OBSTETRICS & GYNECOLOGY

## 2022-08-23 PROCEDURE — 3700000000 HC ANESTHESIA ATTENDED CARE: Performed by: OBSTETRICS & GYNECOLOGY

## 2022-08-23 PROCEDURE — 7100000010 HC PHASE II RECOVERY - FIRST 15 MIN: Performed by: OBSTETRICS & GYNECOLOGY

## 2022-08-23 PROCEDURE — 81025 URINE PREGNANCY TEST: CPT

## 2022-08-23 PROCEDURE — 6360000002 HC RX W HCPCS: Performed by: NURSE ANESTHETIST, CERTIFIED REGISTERED

## 2022-08-23 PROCEDURE — 7100000011 HC PHASE II RECOVERY - ADDTL 15 MIN: Performed by: OBSTETRICS & GYNECOLOGY

## 2022-08-23 PROCEDURE — 2500000003 HC RX 250 WO HCPCS: Performed by: NURSE ANESTHETIST, CERTIFIED REGISTERED

## 2022-08-23 PROCEDURE — 3600000013 HC SURGERY LEVEL 3 ADDTL 15MIN: Performed by: OBSTETRICS & GYNECOLOGY

## 2022-08-23 PROCEDURE — 2580000003 HC RX 258: Performed by: ANESTHESIOLOGY

## 2022-08-23 PROCEDURE — 82962 GLUCOSE BLOOD TEST: CPT

## 2022-08-23 PROCEDURE — 6360000002 HC RX W HCPCS: Performed by: ANESTHESIOLOGY

## 2022-08-23 PROCEDURE — 6370000000 HC RX 637 (ALT 250 FOR IP): Performed by: OBSTETRICS & GYNECOLOGY

## 2022-08-23 PROCEDURE — 2709999900 HC NON-CHARGEABLE SUPPLY: Performed by: OBSTETRICS & GYNECOLOGY

## 2022-08-23 PROCEDURE — 88305 TISSUE EXAM BY PATHOLOGIST: CPT

## 2022-08-23 PROCEDURE — 7100000000 HC PACU RECOVERY - FIRST 15 MIN: Performed by: OBSTETRICS & GYNECOLOGY

## 2022-08-23 RX ORDER — HYDROCODONE BITARTRATE AND ACETAMINOPHEN 5; 325 MG/1; MG/1
1 TABLET ORAL EVERY 6 HOURS PRN
Qty: 12 TABLET | Refills: 0 | Status: SHIPPED | OUTPATIENT
Start: 2022-08-23 | End: 2022-08-26

## 2022-08-23 RX ORDER — OXYCODONE HYDROCHLORIDE 5 MG/1
5 TABLET ORAL
Status: DISCONTINUED | OUTPATIENT
Start: 2022-08-23 | End: 2022-08-23 | Stop reason: HOSPADM

## 2022-08-23 RX ORDER — FENTANYL CITRATE 50 UG/ML
50 INJECTION, SOLUTION INTRAMUSCULAR; INTRAVENOUS EVERY 5 MIN PRN
Status: DISCONTINUED | OUTPATIENT
Start: 2022-08-23 | End: 2022-08-23 | Stop reason: HOSPADM

## 2022-08-23 RX ORDER — HYDRALAZINE HYDROCHLORIDE 20 MG/ML
10 INJECTION INTRAMUSCULAR; INTRAVENOUS
Status: DISCONTINUED | OUTPATIENT
Start: 2022-08-23 | End: 2022-08-23 | Stop reason: HOSPADM

## 2022-08-23 RX ORDER — DIPHENHYDRAMINE HYDROCHLORIDE 50 MG/ML
12.5 INJECTION INTRAMUSCULAR; INTRAVENOUS
Status: DISCONTINUED | OUTPATIENT
Start: 2022-08-23 | End: 2022-08-23 | Stop reason: HOSPADM

## 2022-08-23 RX ORDER — HALOPERIDOL 5 MG/ML
1 INJECTION INTRAMUSCULAR
Status: DISCONTINUED | OUTPATIENT
Start: 2022-08-23 | End: 2022-08-23 | Stop reason: HOSPADM

## 2022-08-23 RX ORDER — ETOMIDATE 2 MG/ML
INJECTION INTRAVENOUS PRN
Status: DISCONTINUED | OUTPATIENT
Start: 2022-08-23 | End: 2022-08-23 | Stop reason: SDUPTHER

## 2022-08-23 RX ORDER — PROCHLORPERAZINE EDISYLATE 5 MG/ML
5 INJECTION INTRAMUSCULAR; INTRAVENOUS
Status: DISCONTINUED | OUTPATIENT
Start: 2022-08-23 | End: 2022-08-23 | Stop reason: HOSPADM

## 2022-08-23 RX ORDER — SODIUM CHLORIDE, SODIUM LACTATE, POTASSIUM CHLORIDE, CALCIUM CHLORIDE 600; 310; 30; 20 MG/100ML; MG/100ML; MG/100ML; MG/100ML
INJECTION, SOLUTION INTRAVENOUS CONTINUOUS
Status: DISCONTINUED | OUTPATIENT
Start: 2022-08-23 | End: 2022-08-23 | Stop reason: HOSPADM

## 2022-08-23 RX ORDER — LABETALOL HYDROCHLORIDE 5 MG/ML
10 INJECTION, SOLUTION INTRAVENOUS
Status: DISCONTINUED | OUTPATIENT
Start: 2022-08-23 | End: 2022-08-23 | Stop reason: HOSPADM

## 2022-08-23 RX ORDER — EPHEDRINE SULFATE 50 MG/ML
INJECTION INTRAVENOUS PRN
Status: DISCONTINUED | OUTPATIENT
Start: 2022-08-23 | End: 2022-08-23 | Stop reason: SDUPTHER

## 2022-08-23 RX ORDER — MIDAZOLAM HYDROCHLORIDE 2 MG/2ML
2 INJECTION, SOLUTION INTRAMUSCULAR; INTRAVENOUS
Status: DISCONTINUED | OUTPATIENT
Start: 2022-08-23 | End: 2022-08-23 | Stop reason: HOSPADM

## 2022-08-23 RX ORDER — FENTANYL CITRATE 50 UG/ML
INJECTION, SOLUTION INTRAMUSCULAR; INTRAVENOUS PRN
Status: DISCONTINUED | OUTPATIENT
Start: 2022-08-23 | End: 2022-08-23 | Stop reason: SDUPTHER

## 2022-08-23 RX ORDER — MEPERIDINE HYDROCHLORIDE 25 MG/ML
6.25 INJECTION INTRAMUSCULAR; INTRAVENOUS; SUBCUTANEOUS EVERY 5 MIN PRN
Status: DISCONTINUED | OUTPATIENT
Start: 2022-08-23 | End: 2022-08-23 | Stop reason: HOSPADM

## 2022-08-23 RX ORDER — IPRATROPIUM BROMIDE AND ALBUTEROL SULFATE 2.5; .5 MG/3ML; MG/3ML
1 SOLUTION RESPIRATORY (INHALATION)
Status: DISCONTINUED | OUTPATIENT
Start: 2022-08-23 | End: 2022-08-23 | Stop reason: HOSPADM

## 2022-08-23 RX ORDER — ONDANSETRON 2 MG/ML
INJECTION INTRAMUSCULAR; INTRAVENOUS PRN
Status: DISCONTINUED | OUTPATIENT
Start: 2022-08-23 | End: 2022-08-23 | Stop reason: SDUPTHER

## 2022-08-23 RX ORDER — LIDOCAINE HYDROCHLORIDE 20 MG/ML
INJECTION, SOLUTION INTRAVENOUS PRN
Status: DISCONTINUED | OUTPATIENT
Start: 2022-08-23 | End: 2022-08-23 | Stop reason: SDUPTHER

## 2022-08-23 RX ORDER — CELECOXIB 200 MG/1
200 CAPSULE ORAL ONCE
Status: COMPLETED | OUTPATIENT
Start: 2022-08-23 | End: 2022-08-23

## 2022-08-23 RX ORDER — ACETAMINOPHEN 500 MG
1000 TABLET ORAL ONCE
Status: COMPLETED | OUTPATIENT
Start: 2022-08-23 | End: 2022-08-23

## 2022-08-23 RX ORDER — PROPOFOL 10 MG/ML
INJECTION, EMULSION INTRAVENOUS PRN
Status: DISCONTINUED | OUTPATIENT
Start: 2022-08-23 | End: 2022-08-23 | Stop reason: SDUPTHER

## 2022-08-23 RX ADMIN — EPHEDRINE SULFATE 5 MG: 50 INJECTION, SOLUTION INTRAVENOUS at 12:29

## 2022-08-23 RX ADMIN — PROPOFOL 20 MG: 10 INJECTION, EMULSION INTRAVENOUS at 12:25

## 2022-08-23 RX ADMIN — FENTANYL CITRATE 50 MCG: 50 INJECTION, SOLUTION INTRAMUSCULAR; INTRAVENOUS at 12:58

## 2022-08-23 RX ADMIN — LIDOCAINE HYDROCHLORIDE 40 MG: 20 INJECTION, SOLUTION INTRAVENOUS at 12:00

## 2022-08-23 RX ADMIN — FENTANYL CITRATE 50 MCG: 50 INJECTION, SOLUTION INTRAMUSCULAR; INTRAVENOUS at 12:23

## 2022-08-23 RX ADMIN — LIDOCAINE HYDROCHLORIDE 60 MG: 20 INJECTION, SOLUTION INTRAVENOUS at 11:59

## 2022-08-23 RX ADMIN — PROPOFOL 20 MG: 10 INJECTION, EMULSION INTRAVENOUS at 12:01

## 2022-08-23 RX ADMIN — ONDANSETRON 4 MG: 2 INJECTION INTRAMUSCULAR; INTRAVENOUS at 12:35

## 2022-08-23 RX ADMIN — ACETAMINOPHEN 1000 MG: 500 TABLET ORAL at 10:30

## 2022-08-23 RX ADMIN — EPHEDRINE SULFATE 5 MG: 50 INJECTION, SOLUTION INTRAVENOUS at 12:30

## 2022-08-23 RX ADMIN — ETOMIDATE 4 MG: 2 INJECTION INTRAVENOUS at 11:59

## 2022-08-23 RX ADMIN — PROPOFOL 20 MG: 10 INJECTION, EMULSION INTRAVENOUS at 11:59

## 2022-08-23 RX ADMIN — FENTANYL CITRATE 50 MCG: 50 INJECTION, SOLUTION INTRAMUSCULAR; INTRAVENOUS at 11:59

## 2022-08-23 RX ADMIN — ETOMIDATE 4 MG: 2 INJECTION INTRAVENOUS at 12:01

## 2022-08-23 RX ADMIN — SODIUM CHLORIDE, POTASSIUM CHLORIDE, SODIUM LACTATE AND CALCIUM CHLORIDE: 600; 310; 30; 20 INJECTION, SOLUTION INTRAVENOUS at 10:28

## 2022-08-23 RX ADMIN — CELECOXIB 200 MG: 200 CAPSULE ORAL at 10:30

## 2022-08-23 ASSESSMENT — PAIN DESCRIPTION - ONSET
ONSET: ON-GOING

## 2022-08-23 ASSESSMENT — PAIN DESCRIPTION - FREQUENCY
FREQUENCY: CONTINUOUS

## 2022-08-23 ASSESSMENT — PAIN DESCRIPTION - ORIENTATION
ORIENTATION: LOWER

## 2022-08-23 ASSESSMENT — PAIN DESCRIPTION - LOCATION
LOCATION: ABDOMEN

## 2022-08-23 ASSESSMENT — PAIN SCALES - GENERAL
PAINLEVEL_OUTOF10: 0
PAINLEVEL_OUTOF10: 7
PAINLEVEL_OUTOF10: 2
PAINLEVEL_OUTOF10: 2

## 2022-08-23 ASSESSMENT — PAIN DESCRIPTION - PAIN TYPE
TYPE: SURGICAL PAIN

## 2022-08-23 ASSESSMENT — PAIN - FUNCTIONAL ASSESSMENT
PAIN_FUNCTIONAL_ASSESSMENT: PREVENTS OR INTERFERES SOME ACTIVE ACTIVITIES AND ADLS
PAIN_FUNCTIONAL_ASSESSMENT: ACTIVITIES ARE NOT PREVENTED
PAIN_FUNCTIONAL_ASSESSMENT: ACTIVITIES ARE NOT PREVENTED

## 2022-08-23 ASSESSMENT — PAIN DESCRIPTION - DESCRIPTORS
DESCRIPTORS: CRAMPING

## 2022-08-23 NOTE — H&P
Department of Gynecology  Attending Pre-operative History and Physical        DIAGNOSIS: Postmenopausal bleeding    INDICATION: Same    PROCEDURE: Hysteroscopy D&C    History obtained from patient    HISTORY OF PRESENT ILLNESS:                     The patient is a 74 y.o. female with significant past medical history of thickened endometrial stripe and postmenopausal bleeding      Past Medical History:        Diagnosis Date    Anemia     Arthritis     Right Hip    Asthma     Cardiomyopathy, nonischemic ([address]     hypertensive type    COPD (chronic obstructive pulmonary disease) ([address] 2014    per PFT 2014    Edema     Essential hypertension     History of blood transfusion     Hyperlipidemia     Major depressive disorder, single episode, unspecified     Mitral valve regurgitation     Obesity     Obstructive sleep apnea on CPAP     Osteopenia     PMB (postmenopausal bleeding)     Reflux esophagitis     Type 2 diabetes mellitus without complication ([address]     Vaginal discharge      Past Surgical History:        Procedure Laterality Date    CARDIAC CATHETERIZATION      X 2 - Dr. Laney Edward; No interventions    CATARACT REMOVAL      COLONOSCOPY      COLONOSCOPY  10/20/2014    colon polyp, internal hemorrhoids    DILATION AND CURETTAGE OF UTERUS      X 2    EYE SURGERY  8years old    [address]  as a child       OB History   No obstetric history on file. Medications Prior to Admission:   Medications Prior to Admission: umeclidinium-vilanterol (ANORO ELLIPTA) 62.5-25 MCG/INH AEPB inhaler, Inhale 1 puff into the lungs in the morning.   NONFORMULARY, Sleep aide(Equate)  empagliflozin (JARDIANCE) 10 MG tablet, Take 10 mg by mouth daily  carvedilol (COREG) 6.25 MG tablet, Take 6.25 mg by mouth 2 times daily (with meals)  escitalopram (LEXAPRO) 10 MG tablet, Take 1 tablet by mouth daily  simvastatin (ZOCOR) 20 MG tablet, Take 1 tablet by mouth every evening  montelukast (SINGULAIR) 10 MG tablet, Take 1 tablet by mouth nightly  Coenzyme Q10 (COQ10 PO), Take 1 capsule by mouth daily   Potassium 99 MG TABS, Take 1 tablet by mouth daily Indications: OTC  Calcium Carb-Cholecalciferol 600-800 MG-UNIT CHEW, DAILY  albuterol sulfate  (90 Base) MCG/ACT inhaler, INHALE 2 PUFFS INTO THE LUNGS EVERY 6 HOURS AS NEEDED FOR WHEEZING  losartan-hydrochlorothiazide (HYZAAR) 50-12.5 MG per tablet,     Allergies:  Latex, Norvasc [amlodipine besylate], Ace inhibitors, and Soybean-containing drug products     Family History:       Adopted: Yes   Problem Relation Age of Onset    Heart Disease Mother     Other Father         killed in the [address] [place] [address] remains found    No Known Problems Sister     No Known Problems Brother        PHYSICAL EXAM:    Vitals:  BP (!) 148/72   Pulse 64   Temp 97.8 °F (36.6 °C) (Temporal)   Resp 18   SpO2 97%     E  ASSESSMENT AND PLAN:      1. Patient is a 74 y.o. female with above specified procedure planned hysteroscopy D&C with anesthesia    2. Procedure options, risks and benefits reviewed with patient. Patient expresses understanding.

## 2022-08-23 NOTE — PROGRESS NOTES
1247- pt received from OR. Monitors placed and alarms on. Report received from [facility]. Nu pad placed. 1258- pt medicated for complaints of pain. 1305- pt resting comfortably with eyes closed. Arouses easily to name being called. 1310- pt denies any nausea. Tolerating ice chips well. 1315- pt resting comfortably. Denies any chest pain or shortness of breath. Dr Denis Montoya at bedside speaking with pt.  1325- pt repositioned in bed. Linens adjusted. Pt clean and dry. Nu pad changed, scant amount of blood tinged drainage noted. 1330- pt transported to [facility] by RN and bedside report given to [employer].

## 2022-08-23 NOTE — OP NOTE
Department of Obstetrics and Gynecology   Operative Report        Pre-operative Diagnosis: Thickened endometrial stripe, postmenopausal bleeding  Post-operative Diagnosis:  Same and endometrial polyp    Procedure: Hysteroscopy D&C with polypectomy  Surgeon:  Stacey Palencia MD     Assistant(s): None  Anesthesia: MAC  Findings: Large endometrial polyp  Estimated blood loss: 10 cc  Specimens: Polyp with endometrial curette scrapings    Complications:  none    Condition:  good    Patient was taken the operating room where anesthesia established and found to be adequate. Patient was placed in a low lithotomy position utilizing yellowfin stirrups and prepped and draped in usual sterile fashion. Cervix was grasped single-tooth tenaculum and sequentially dilated. 30 degree hysteroscope with normal saline irrigation was then passed to the fundus. A cavity filling polyp was noted. No other lesions were seen. With a laparoscopic fundal grasper and multiple passes the polyp was grasped and pulled free. Endometrial scrapings were obtained with a Pipelle. All instruments removed from the vagina. Patient tolerated the procedure well. All counts correct in the procedure. Patient was taken recovery room in satisfactory condition.

## 2022-08-23 NOTE — DISCHARGE INSTRUCTIONS
exercise regularly, you should not restart until the doctor tells you that it is OK to do so. It is OK to walk as your body tolerates. Medication:     You may resume all your usual medications after surgery, unless told otherwise. While you will be sent home with strong pain medications containing small amounts of narcotics, you may also take milder medications such as Naprosyn (Aleve), Acetaminophen (Tylenol), or Ibuprofen (Motrin) for your pain. At first, take the narcotic pain medicines during the day, and use the milder medications to take the edge off in between. Take your pain medication when you first begin to feel discomfort. Do not wait until your pain is intense to take your medications. The narcotic pain medications may cause nausea or constipation. As you heal, you may find that you feel better without those medications. If Acetaminophen (Tylenol) or Ibuprofen (Motrin) relieves the pain, use these medications instead. Incision:     Abdominal incision:   You may get the incision wet in the shower but avoid tub baths for two weeks. If the incision appears dirty or caked, you may clean it using hydrogen peroxide or a cotton swab. If you have small plastic bandages called steri-strips on the incision, they may gradually fall off in the shower. You may trim the curled edges with scissors. They should fall off within one to two weeks; if not, you may gently pull them off. Vaginal Bleeding: It is normal to experience vaginal spotting and light discharge for up to a month after surgery, whether incision was abdominal or vaginal.     Bladder Catheter: If you are discharged from the hospital with a catheter, you should return to the physicians office as instructed by your surgeon for catheter removal.     Return Visit: You will need to return to see your surgeon two weeks after the date of your surgery.  At that time, your incision will be checked and any questions you may have will be answered (i.e. what more you can do physically; such as driving a car, exercise). Returning to work: This depends on the type of surgery you had and how quickly you recover. Your doctor will determine when its appropriate for you to return to work. Leave surgical dressing/bandages on for 24 hours. You may shower or bathe in the morning. You may experience some shoulder pain (especially on the right) and chest pain. This is normal and caused by the gas injected into the abdomen. The gas is inserted to create a working and viewing space inside the abdomen during surgery. No intercourse, douching or tampons for 7 days. Expect some vaginal bleeding   Take pain medication as directed. You may resume your normal daily activities as tolerated. Resume your normal diet. Try to avoid constipation by eating high fiber foods or adding a fiber supplement such as Metamucil, Fibercon, or Benefiber; especially while taking a narcotic pain medication. [facility]  [phone]    Do not drive, work around [address] [place] [address] or use equipment. Do not drink any alcoholic beverages. Do not smoke while alone. Avoid making important decisions. Plan to spend a quiet, relaxed evening @ home. Resume normal activities as you begin to feel better. Eat lightly for your first meal, then gradually increase your diet to what is normal for you. In case of nausea, avoid food and drink only clear liquids. Resume food as nausea ceases. Notify your surgeon if you experience fever, chills, large amount of bleeding, difficulty breathing, persistent nausea and vomiting or any other disturbing problem. Call for a follow-up appointment with your surgeon.

## 2022-08-23 NOTE — PROGRESS NOTES
1410- Discharge instructions provided to patient and friend Mi Serra called. Understanding voiced. 1422- Out to car per wheelchair. Home with friend.

## 2022-08-23 NOTE — ANESTHESIA POSTPROCEDURE EVALUATION
Department of Anesthesiology  Postprocedure Note    Patient: Theresa Fields  MRN: [id number]  YOB: 1950  Date of evaluation: 8/23/2022      Procedure Summary     Date: 08/23/22 Room / Location: [facility] OR [address] [place] [address]    Anesthesia Start: 1152 Anesthesia Stop: 1245    Procedure: DILATATION AND CURETTAGE HYSTEROSCOPY (Vagina ) Diagnosis:       Postmenopausal bleeding      Discharge of vagina      (Postmenopausal bleeding [N95.0])      (Discharge of vagina [N89.8])    Surgeons: Daljit Dominguez MD Responsible Provider: Jan Recinos MD    Anesthesia Type: General ASA Status: 3          Anesthesia Type: General    Ben Phase I:      Ben Phase II:        Anesthesia Post Evaluation    Patient location during evaluation: PACU  Patient participation: complete - patient participated  Level of consciousness: awake and alert  Pain score: 0  Airway patency: patent  Nausea & Vomiting: no nausea and no vomiting  Complications: no  Cardiovascular status: blood pressure returned to baseline and hemodynamically stable  Respiratory status: acceptable, spontaneous ventilation, nonlabored ventilation and face mask  Hydration status: stable

## 2022-08-31 NOTE — PROGRESS NOTES
8/31/22    Abhay Cho  1950    Chief Complaint   Patient presents with    Pre-op Exam     Pre-op today. Hysteroscopy D&C 8/23/22 due to PMB and vaginal discharge. Consent signed.          Abhay Cho is a 74 y.o. female who presents today for evaluation of complaints as above including postmenopausal bleeding    Past Medical History:   Diagnosis Date    Anemia     Arthritis     Right Hip    Asthma     Cardiomyopathy, nonischemic ([address]     hypertensive type    COPD (chronic obstructive pulmonary disease) ([address] 2014    per PFT 2014    Edema     Essential hypertension     History of blood transfusion     Hyperlipidemia     Major depressive disorder, single episode, unspecified     Mitral valve regurgitation     Obesity     Obstructive sleep apnea on CPAP     Osteopenia     PMB (postmenopausal bleeding)     Reflux esophagitis     Type 2 diabetes mellitus without complication ([address]     Vaginal discharge        Past Surgical History:   Procedure Laterality Date    CARDIAC CATHETERIZATION      X 2 - Dr. Juan Carlos Kelly; No interventions    CATARACT REMOVAL      COLONOSCOPY      COLONOSCOPY  10/20/2014    colon polyp, internal hemorrhoids    DILATION AND CURETTAGE OF UTERUS      X 2    DILATION AND CURETTAGE OF UTERUS N/A 8/23/2022    DILATATION AND CURETTAGE HYSTEROSCOPY, POLYPECTOMY performed by David Rosenbaum MD at [address] [place] [address]  8years old    [address]  as a child       Social History     Tobacco Use    Smoking status: Never    Smokeless tobacco: Never   Vaping Use    Vaping Use: Never used   Substance Use Topics    Alcohol use: Not Currently     Alcohol/week: 0.0 standard drinks     Comment: Occassional    Drug use: No       Family History   Adopted: Yes   Problem Relation Age of Onset    Heart Disease Mother     Other Father         killed in the [address] [place] [address] remains found    No Known Problems Sister     No Known Problems Brother        Current Outpatient Medications   Medication Sig Dispense Refill    umeclidinium-vilanterol (ANORO ELLIPTA) 62.5-25 MCG/INH AEPB inhaler Inhale 1 puff into the lungs in the morning. 1 each 5    NONFORMULARY Sleep aide(Equate)      empagliflozin (JARDIANCE) 10 MG tablet Take 10 mg by mouth daily      carvedilol (COREG) 6.25 MG tablet Take 6.25 mg by mouth 2 times daily (with meals)      escitalopram (LEXAPRO) 10 MG tablet Take 1 tablet by mouth daily 90 tablet 1    simvastatin (ZOCOR) 20 MG tablet Take 1 tablet by mouth every evening 90 tablet 1    montelukast (SINGULAIR) 10 MG tablet Take 1 tablet by mouth nightly 90 tablet 3    Coenzyme Q10 (COQ10 PO) Take 1 capsule by mouth daily       Potassium 99 MG TABS Take 1 tablet by mouth daily Indications: OTC      Calcium Carb-Cholecalciferol 600-800 MG-UNIT CHEW DAILY      albuterol sulfate  (90 Base) MCG/ACT inhaler INHALE 2 PUFFS INTO THE LUNGS EVERY 6 HOURS AS NEEDED FOR WHEEZING 6.7 g 1    losartan-hydrochlorothiazide (HYZAAR) 50-12.5 MG per tablet        No current facility-administered medications for this visit. Allergies   Allergen Reactions    Latex Rash    Norvasc [Amlodipine Besylate]     Ace Inhibitors      Cough    Soybean-Containing Drug Products        No obstetric history on file. Immunization History   Administered Date(s) Administered    COVID-19, PFIZER PURPLE top, DILUTE for use, (age 15 y+), 30mcg/0.3mL 02/02/2021, 02/23/2021, 11/23/2021    Influenza Virus Vaccine 11/12/2014, 09/26/2019    Influenza, Triv, 3 Years and older, IM (Afluria (5 yrs and older) 12/27/2018    Tdap (Boostrix, Adacel) 10/01/2017    Zoster Recombinant (Shingrix) 09/26/2019       Review of Systems  All other systems reviewed and are negative    /73   Wt 226 lb (102.5 kg)   BMI 44.14 kg/m²     Physical Exam    No results found for this visit on 08/18/22. ASSESSMENT AND PLAN   Diagnosis Orders   1. PMB (postmenopausal bleeding)        Hysteroscopy D&C    Return for surgery, postop.     Cherise Maynard, MD

## 2022-09-03 NOTE — ANESTHESIA PRE PROCEDURE
Department of Anesthesiology  Preprocedure Note       Name:  Jono Thomas   Age:  74 y.o.  :  1950                                          MRN:  [id number]         Date:  9/3/2022      Surgeon: Lord Taylor):  Girish Murray MD    Procedure: Procedure(s):  DILATATION AND CURETTAGE HYSTEROSCOPY, POLYPECTOMY    Medications prior to admission:   Prior to Admission medications    Medication Sig Start Date End Date Taking? Authorizing Provider   umeclidinium-vilanterol (ANORO ELLIPTA) 62.5-25 MCG/INH AEPB inhaler Inhale 1 puff into the lungs in the morning. 22   Steven Griffin MD   NONFORMULARY Sleep aide(Equate)    Historical Provider, MD   empagliflozin (JARDIANCE) 10 MG tablet Take 10 mg by mouth daily    Historical Provider, MD   carvedilol (COREG) 6.25 MG tablet Take 6.25 mg by mouth 2 times daily (with meals)    Historical Provider, MD   escitalopram (LEXAPRO) 10 MG tablet Take 1 tablet by mouth daily 22   Aparna Ricci DO   simvastatin (ZOCOR) 20 MG tablet Take 1 tablet by mouth every evening 22   Aparna Ricci, DO   montelukast (SINGULAIR) 10 MG tablet Take 1 tablet by mouth nightly 21   Steven Griffin MD   Coenzyme Q10 (COQ10 PO) Take 1 capsule by mouth daily     Historical Provider, MD   Potassium 99 MG TABS Take 1 tablet by mouth daily Indications: OTC    Historical Provider, MD   Calcium Carb-Cholecalciferol 600-800 MG-UNIT [address] 3/5/21   Historical Provider, MD   albuterol sulfate  (90 Base) MCG/ACT inhaler INHALE 2 PUFFS INTO THE LUNGS EVERY 6 HOURS AS NEEDED FOR WHEEZING 3/17/21   Clemente Lundy MD   losartan-hydrochlorothiazide (HYZAAR) 50-12.5 MG per tablet  18   Historical Provider, MD       Current medications:    No current facility-administered medications for this encounter.      Current Outpatient Medications   Medication Sig Dispense Refill    umeclidinium-vilanterol (ANORO ELLIPTA) 62.5-25 MCG/INH AEPB inhaler Inhale 1 puff into the lungs in the morning. 1 each 5    NONFORMULARY Sleep aide(Equate)      empagliflozin (JARDIANCE) 10 MG tablet Take 10 mg by mouth daily      carvedilol (COREG) 6.25 MG tablet Take 6.25 mg by mouth 2 times daily (with meals)      escitalopram (LEXAPRO) 10 MG tablet Take 1 tablet by mouth daily 90 tablet 1    simvastatin (ZOCOR) 20 MG tablet Take 1 tablet by mouth every evening 90 tablet 1    montelukast (SINGULAIR) 10 MG tablet Take 1 tablet by mouth nightly 90 tablet 3    Coenzyme Q10 (COQ10 PO) Take 1 capsule by mouth daily       Potassium 99 MG TABS Take 1 tablet by mouth daily Indications: OTC      Calcium Carb-Cholecalciferol 600-800 MG-UNIT CHEW DAILY      albuterol sulfate  (90 Base) MCG/ACT inhaler INHALE 2 PUFFS INTO THE LUNGS EVERY 6 HOURS AS NEEDED FOR WHEEZING 6.7 g 1    losartan-hydrochlorothiazide (HYZAAR) 50-12.5 MG per tablet          Allergies: Allergies   Allergen Reactions    Latex Rash    Norvasc [Amlodipine Besylate]     Ace Inhibitors      Cough    Soybean-Containing Drug Products        Problem List:    Patient Active Problem List   Diagnosis Code    Centrilobular emphysema (HCC) J43.2    Mild intermittent asthma without complication D77.97    YOLANDA on CPAP G47.33, Z99.89    Type 2 diabetes mellitus without complication, with long-term current use of insulin (HCC) E11.9, Z79.4    Depression F32. A    Hyperlipidemia E78.5    Cardiomyopathy, nonischemic (HCC) I42.8    Essential hypertension I10    PMB (postmenopausal bleeding) N95.0    Vaginal discharge N89.8       Past Medical History:        Diagnosis Date    Anemia     Arthritis     Right Hip    Asthma     Cardiomy[facility], [facility]ischemic [facility]     hypertensive type    COPD (chronic obstructive pulmonary disease) ([facility] 2014    per PFT 2014    Edema     Essential hypertension     History of blood transfusion     Hyperlipidemia     Major depressive disorder, single episode, unspecified     Mitral valve regurgitation     Obesity     Obstructive sleep apnea on CPAP     Osteopenia     PMB (postmenopausal bleeding)     Reflux esophagitis     Type 2 diabetes mellitus without complication (HCC)     Vaginal discharge        Past Surgical History:        Procedure Laterality Date    CARDIAC CATHETERIZATION      X 2 - Dr. Toby James; No interventions    CATARACT REMOVAL      COLONOSCOPY      COLONOSCOPY  10/20/2014    colon polyp, internal hemorrhoids    DILATION AND CURETTAGE OF UTERUS      X 2    DILATION AND CURETTAGE OF UTERUS N/A 8/23/2022    DILATATION AND CURETTAGE HYSTEROSCOPY, POLYPECTOMY performed by Miguel Merino MD at [facility]  8years old   [place] WISDOM TOOTH EXTRACTION  as a child       Social History:    Social History     Tobacco Use    Smoking status: Never    Smokeless tobacco: Never   Substance Use Topics    Alcohol use: Not Currently     Alcohol/week: 0.0 standard drinks     Comment: Occassional                                Counseling given: Not Answered      Vital Signs (Current):   Vitals:    08/23/22 1320 08/23/22 1327 08/23/22 1331 08/23/22 1400   BP: (!) 153/79  (!) 150/74 (!) 149/80   Pulse: 67 66 60 60   Resp: 21 18 18   Temp: 97.4 °F (36.3 °C)  96.9 °F (36.1 °C) 97.3 °F (36.3 °C)   TempSrc: Temporal  Temporal Temporal   SpO2: 95%  95% 96%                                              BP Readings from Last 3 Encounters:   08/23/22 (!) 149/80   08/18/22 114/73   07/27/22 118/67       NPO Status: Time of last liquid consumption: 0000                        Time of last solid consumption: 1900                        Date of last liquid consumption: 08/23/22                        Date of last solid food consumption: 08/22/22    BMI:   Wt Readings from Last 3 Encounters:   08/18/22 226 lb (102.5 kg)   08/01/22 228 lb (103.4 kg)   07/14/22 228 lb (103.4 kg)     There is no height or weight on file to calculate BMI.    CBC:   Lab Results   Component Value Date/Time    WBC 6.3 08/23/2022 10:22 AM    RBC 4.60 08/23/2022 10:22 AM    HGB 12.7 08/23/2022 10:22 AM    HCT 40.2 08/23/2022 10:22 AM    MCV 87.4 08/23/2022 10:22 AM    RDW 14.4 08/23/2022 10:22 AM     08/23/2022 10:22 AM       CMP:   Lab Results   Component Value Date/Time     08/23/2022 10:22 AM    K 3.9 08/23/2022 10:22 AM     08/23/2022 10:22 AM    CO2 31 08/23/2022 10:22 AM    BUN 14 08/23/2022 10:22 AM    CREATININE 0.6 08/23/2022 10:22 AM    GFRAA >60 08/23/2022 10:22 AM    LABGLOM >60 08/23/2022 10:22 AM    GLUCOSE 128 08/23/2022 10:22 AM    PROT 6.5 06/09/2022 10:01 AM    PROT 6.6 06/15/2012 08:46 AM    CALCIUM 9.5 08/23/2022 10:22 AM    BILITOT 0.3 06/09/2022 10:01 AM    ALKPHOS 97 06/09/2022 10:01 AM    AST 17 06/09/2022 10:01 AM    ALT 18 06/09/2022 10:01 AM       POC Tests: No results for input(s): POCGLU, POCNA, POCK, POCCL, POCBUN, POCHEMO, POCHCT in the last 72 hours. Coags:   Lab Results   Component Value Date/Time    PROTIME 11.3 06/27/2022 09:16 AM    INR 0.88 06/27/2022 09:16 AM    APTT 31.3 06/27/2022 09:16 AM       HCG (If Applicable):   Lab Results   Component Value Date    PREGTESTUR NEGATIVE 08/23/2022        ABGs: No results found for: PHART, PO2ART, XXU4MMQ, UNK3YTR, BEART, Z3IRZXZL     Type & Screen (If Applicable):  No results found for: LABABO, LABRH    Drug/Infectious Status (If Applicable):  Lab Results   Component Value Date/Time    HEPCAB NON REACTIVE 01/15/2018 10:14 AM       COVID-19 Screening (If Applicable): No results found for: COVID19        Anesthesia Evaluation    Airway: Mallampati: Unable to assess / NA          Dental:          Pulmonary:                              Cardiovascular:                      Neuro/Psych:               GI/Hepatic/Renal:             Endo/Other:                     Abdominal:             Vascular:           Other Findings:           Anesthesia Plan      [facility] [address] MD   9/3/2022

## 2022-10-02 DIAGNOSIS — E78.5 HYPERLIPIDEMIA, UNSPECIFIED HYPERLIPIDEMIA TYPE: ICD-10-CM

## 2022-10-02 DIAGNOSIS — F32.A DEPRESSION, UNSPECIFIED DEPRESSION TYPE: ICD-10-CM

## 2022-10-03 RX ORDER — ESCITALOPRAM OXALATE 10 MG/1
10 TABLET ORAL DAILY
Qty: 90 TABLET | Refills: 0 | Status: SHIPPED | OUTPATIENT
Start: 2022-10-03

## 2022-10-03 RX ORDER — SIMVASTATIN 20 MG
20 TABLET ORAL EVERY EVENING
Qty: 90 TABLET | Refills: 0 | Status: SHIPPED | OUTPATIENT
Start: 2022-10-03

## 2022-10-07 ENCOUNTER — HOSPITAL ENCOUNTER (OUTPATIENT)
Age: 72
Discharge: HOME | End: 2022-10-07
Payer: MEDICARE

## 2022-10-07 ENCOUNTER — HOSPITAL ENCOUNTER (OUTPATIENT)
Dept: GENERAL RADIOLOGY | Age: 72
Discharge: HOME | End: 2022-10-07
Payer: MEDICARE

## 2022-10-07 DIAGNOSIS — E11.9 TYPE 2 DIABETES MELLITUS WITHOUT COMPLICATION, WITH LONG-TERM CURRENT USE OF INSULIN (HCC): ICD-10-CM

## 2022-10-07 DIAGNOSIS — Z79.4 TYPE 2 DIABETES MELLITUS WITHOUT COMPLICATION, WITH LONG-TERM CURRENT USE OF INSULIN (HCC): ICD-10-CM

## 2022-10-07 DIAGNOSIS — J43.2 CENTRILOBULAR EMPHYSEMA (HCC): ICD-10-CM

## 2022-10-07 LAB
ESTIMATED AVERAGE GLUCOSE: 126 MG/DL
HBA1C MFR BLD: 6 % (ref 4.2–6.3)

## 2022-10-07 PROCEDURE — 36415 COLL VENOUS BLD VENIPUNCTURE: CPT

## 2022-10-07 PROCEDURE — 83036 HEMOGLOBIN GLYCOSYLATED A1C: CPT

## 2022-10-07 PROCEDURE — 71046 X-RAY EXAM CHEST 2 VIEWS: CPT

## 2022-10-10 SDOH — HEALTH STABILITY: PHYSICAL HEALTH: ON AVERAGE, HOW MANY DAYS PER WEEK DO YOU ENGAGE IN MODERATE TO STRENUOUS EXERCISE (LIKE A BRISK WALK)?: 0 DAYS

## 2022-10-10 SDOH — HEALTH STABILITY: PHYSICAL HEALTH: ON AVERAGE, HOW MANY MINUTES DO YOU ENGAGE IN EXERCISE AT THIS LEVEL?: 0 MIN

## 2022-10-10 ASSESSMENT — PATIENT HEALTH QUESTIONNAIRE - PHQ9
1. LITTLE INTEREST OR PLEASURE IN DOING THINGS: 3
7. TROUBLE CONCENTRATING ON THINGS, SUCH AS READING THE NEWSPAPER OR WATCHING TELEVISION: 3
10. IF YOU CHECKED OFF ANY PROBLEMS, HOW DIFFICULT HAVE THESE PROBLEMS MADE IT FOR YOU TO DO YOUR WORK, TAKE CARE OF THINGS AT HOME, OR GET ALONG WITH OTHER PEOPLE: 1
8. MOVING OR SPEAKING SO SLOWLY THAT OTHER PEOPLE COULD HAVE NOTICED. OR THE OPPOSITE, BEING SO FIGETY OR RESTLESS THAT YOU HAVE BEEN MOVING AROUND A LOT MORE THAN USUAL: 2
9. THOUGHTS THAT YOU WOULD BE BETTER OFF DEAD, OR OF HURTING YOURSELF: 0
6. FEELING BAD ABOUT YOURSELF - OR THAT YOU ARE A FAILURE OR HAVE LET YOURSELF OR YOUR FAMILY DOWN: 0
3. TROUBLE FALLING OR STAYING ASLEEP: 3
SUM OF ALL RESPONSES TO PHQ QUESTIONS 1-9: 15
SUM OF ALL RESPONSES TO PHQ9 QUESTIONS 1 & 2: 3
SUM OF ALL RESPONSES TO PHQ QUESTIONS 1-9: 15
SUM OF ALL RESPONSES TO PHQ QUESTIONS 1-9: 15
4. FEELING TIRED OR HAVING LITTLE ENERGY: 2
SUM OF ALL RESPONSES TO PHQ QUESTIONS 1-9: 15
5. POOR APPETITE OR OVEREATING: 2
2. FEELING DOWN, DEPRESSED OR HOPELESS: 0

## 2022-10-10 ASSESSMENT — LIFESTYLE VARIABLES
HOW OFTEN DO YOU HAVE A DRINK CONTAINING ALCOHOL: NEVER
HOW MANY STANDARD DRINKS CONTAINING ALCOHOL DO YOU HAVE ON A TYPICAL DAY: 0
HOW OFTEN DO YOU HAVE SIX OR MORE DRINKS ON ONE OCCASION: 1
HOW OFTEN DO YOU HAVE A DRINK CONTAINING ALCOHOL: 1
HOW MANY STANDARD DRINKS CONTAINING ALCOHOL DO YOU HAVE ON A TYPICAL DAY: PATIENT DOES NOT DRINK

## 2022-10-11 ENCOUNTER — TELEPHONE (OUTPATIENT)
Dept: CARDIOLOGY CLINIC | Age: 72
End: 2022-10-11

## 2022-10-17 ENCOUNTER — TELEMEDICINE (OUTPATIENT)
Dept: INTERNAL MEDICINE CLINIC | Age: 72
End: 2022-10-17
Payer: MEDICARE

## 2022-10-17 DIAGNOSIS — Z00.00 MEDICARE ANNUAL WELLNESS VISIT, SUBSEQUENT: Primary | ICD-10-CM

## 2022-10-17 PROCEDURE — 1123F ACP DISCUSS/DSCN MKR DOCD: CPT | Performed by: FAMILY MEDICINE

## 2022-10-17 PROCEDURE — G0439 PPPS, SUBSEQ VISIT: HCPCS | Performed by: FAMILY MEDICINE

## 2022-10-17 RX ORDER — IBUPROFEN 200 MG
200 TABLET ORAL PRN
COMMUNITY

## 2022-10-17 RX ORDER — FUROSEMIDE 40 MG/1
40 TABLET ORAL 2 TIMES DAILY
COMMUNITY

## 2022-10-17 RX ORDER — ACETAMINOPHEN 325 MG/1
650 TABLET ORAL PRN
COMMUNITY

## 2022-10-17 SDOH — ECONOMIC STABILITY: FOOD INSECURITY: WITHIN THE PAST 12 MONTHS, THE FOOD YOU BOUGHT JUST DIDN'T LAST AND YOU DIDN'T HAVE MONEY TO GET MORE.: NEVER TRUE

## 2022-10-17 SDOH — ECONOMIC STABILITY: FOOD INSECURITY: WITHIN THE PAST 12 MONTHS, YOU WORRIED THAT YOUR FOOD WOULD RUN OUT BEFORE YOU GOT MONEY TO BUY MORE.: NEVER TRUE

## 2022-10-17 ASSESSMENT — PATIENT HEALTH QUESTIONNAIRE - PHQ9
SUM OF ALL RESPONSES TO PHQ QUESTIONS 1-9: 5
8. MOVING OR SPEAKING SO SLOWLY THAT OTHER PEOPLE COULD HAVE NOTICED. OR THE OPPOSITE, BEING SO FIGETY OR RESTLESS THAT YOU HAVE BEEN MOVING AROUND A LOT MORE THAN USUAL: 0
3. TROUBLE FALLING OR STAYING ASLEEP: 1
2. FEELING DOWN, DEPRESSED OR HOPELESS: 0
5. POOR APPETITE OR OVEREATING: 1
SUM OF ALL RESPONSES TO PHQ QUESTIONS 1-9: 5
9. THOUGHTS THAT YOU WOULD BE BETTER OFF DEAD, OR OF HURTING YOURSELF: 0
SUM OF ALL RESPONSES TO PHQ QUESTIONS 1-9: 5
1. LITTLE INTEREST OR PLEASURE IN DOING THINGS: 1
10. IF YOU CHECKED OFF ANY PROBLEMS, HOW DIFFICULT HAVE THESE PROBLEMS MADE IT FOR YOU TO DO YOUR WORK, TAKE CARE OF THINGS AT HOME, OR GET ALONG WITH OTHER PEOPLE: 1
SUM OF ALL RESPONSES TO PHQ9 QUESTIONS 1 & 2: 1
4. FEELING TIRED OR HAVING LITTLE ENERGY: 2
6. FEELING BAD ABOUT YOURSELF - OR THAT YOU ARE A FAILURE OR HAVE LET YOURSELF OR YOUR FAMILY DOWN: 0
SUM OF ALL RESPONSES TO PHQ QUESTIONS 1-9: 5
7. TROUBLE CONCENTRATING ON THINGS, SUCH AS READING THE NEWSPAPER OR WATCHING TELEVISION: 0

## 2022-10-17 ASSESSMENT — LIFESTYLE VARIABLES
HOW OFTEN DO YOU HAVE A DRINK CONTAINING ALCOHOL: NEVER
HOW MANY STANDARD DRINKS CONTAINING ALCOHOL DO YOU HAVE ON A TYPICAL DAY: PATIENT DOES NOT DRINK

## 2022-10-17 ASSESSMENT — SOCIAL DETERMINANTS OF HEALTH (SDOH): HOW HARD IS IT FOR YOU TO PAY FOR THE VERY BASICS LIKE FOOD, HOUSING, MEDICAL CARE, AND HEATING?: NOT VERY HARD

## 2022-10-17 NOTE — PATIENT INSTRUCTIONS
Personalized Preventive Plan for Kayla Branham - 10/17/2022  Medicare offers a range of preventive health benefits. Some of the tests and screenings are paid in full while other may be subject to a deductible, co-insurance, and/or copay. Some of these benefits include a comprehensive review of your medical history including lifestyle, illnesses that may run in your family, and various assessments and screenings as appropriate. After reviewing your medical record and screening and assessments performed today your provider may have ordered immunizations, labs, imaging, and/or referrals for you. A list of these orders (if applicable) as well as your Preventive Care list are included within your After Visit Summary for your review. Other Preventive Recommendations:    A preventive eye exam performed by an eye specialist is recommended every 1-2 years to screen for glaucoma; cataracts, macular degeneration, and other eye disorders. A preventive dental visit is recommended every 6 months. Try to get at least 150 minutes of exercise per week or 10,000 steps per day on a pedometer . Order or download the FREE \"Exercise & Physical Activity: Your Everyday Guide\" from [employer]. Call 3-410.332.2388 or search [employer] online. You need 8462-8100 mg of calcium and 7925-5740 IU of vitamin D per day. It is possible to meet your calcium requirement with diet alone, but a vitamin D supplement is usually necessary to meet this goal.  When exposed to the sun, use a sunscreen that protects against both UVA and UVB radiation with an SPF of 30 or greater. Reapply every 2 to 3 hours or after sweating, drying off with a towel, or swimming. Always wear a seat belt when traveling in a car. Always wear a helmet when riding a bicycle or motorcycle.

## 2022-10-17 NOTE — PROGRESS NOTES
Medicare Annual Wellness Visit    Sharyle Funk is here for Medicare AWV    Assessment & Plan   Medicare annual wellness visit, subsequent    Recommendations for Preventive Services Due: see orders and patient instructions/AVS.  Recommended screening schedule for the next 5-10 years is provided to the patient in written form: see Patient Instructions/AVS.     No follow-ups on file. Subjective       Patient's complete Health Risk Assessment and screening values have been reviewed and are found in Flowsheets. The following problems were reviewed today and where indicated follow up appointments were made and/or referrals ordered. Positive Risk Factor Screenings with Interventions:      Depression:  PHQ-2 Score: 1  PHQ-9 Total Score: 5    Severity:1-4 = minimal depression, 5-9 = mild depression, 10-14 = moderate depression, 15-19 = moderately severe depression, 20-27 = severe depression  Depression Interventions:  LPN INTERVENTION GUIDE: SCORE 5-14 = MODERATE DEPRESSION: FOLLOW UP IN 1 WEEK  Patient denies suicidal ideation and declines an office visit for depression at this time.           General Health and ACP:  General  In general, how would you say your health is?: Fair  In the past 7 days, have you experienced any of the following: New or Increased Pain, New or Increased Fatigue, Loneliness, Social Isolation, Stress or Anger?: No  Do you get the social and emotional support that you need?: Yes  Do you have a Living Will?: (!) No    Advance Directives       Power of [address] [place] [address] Will ACP-Advance Directive ACP-Power of     Not on File Not on File Not on File Not on File          General Health Risk Interventions:  No Living Will: Advance Care Planning addressed with patient today    Health Habits/Nutrition:  Physical Activity: Inactive    Days of Exercise per Week: 0 days    Minutes of Exercise per Session: 0 min     Have you lost any weight without trying in the past 3 months?: No     Have you seen the dentist within the past year?: Yes  Health Habits/Nutrition Interventions:  Inadequate physical activity:  patient is not ready to increase his/her physical activity level at this time  Nutritional issues:  patient is not ready to address his/her nutritional/weight issues at this time    Hearing/Vision:  Do you or your family notice any trouble with your hearing that hasn't been managed with hearing aids?: (!) Yes (is getting hearing checked again, states she does have hearing aids but the battery only lasts about 3 days)  Do you have difficulty driving, watching TV, or doing any of your daily activities because of your eyesight?: No  Have you had an eye exam within the past year?: Yes  No results found. Hearing/Vision Interventions:  Hearing concerns:  patient states she is going to get her hearing tested again. States she has hearing aids, but the battery only lasts about 3 days. ADLs:  In the past 7 days, did you need help from others to perform any of the following everyday activities: Eating, dressing, grooming, bathing, toileting, or walking/balance?: No  In the past 7 days, did you need help from others to take care of any of the following: Laundry, housekeeping, banking/finances, shopping, telephone use, food preparation, transportation, or taking medications?: (!) Yes  Select all that apply: [employer], Food Preparation (shops online and has groceries delivered; receives meals on wheels about 5 times/week-orders out frequently)  ADL Interventions:  Patient declines any further evaluation/treatment for this issue  States she shops online and has groceries delivered. Receives meals on wheels about 5 times per week and orders out frequently. States she can do simple meals at her apartment.           Objective      Patient-Reported Vitals  Patient-Reported Systolic (Top): 103 mmHg  Patient-Reported Diastolic (Bottom): 96 mmHg  BP Observations: Yes, BP was taken on electronic monitoring device with digital readout  Patient-Reported Pulse: 65  Patient-Reported Weight: 230lb  Patient-Reported Height: 5'            Allergies   Allergen Reactions    Latex Rash    Norvasc [Amlodipine Besylate]     Ace Inhibitors      Cough    Soybean-Containing Drug Products      Prior to Visit Medications    Medication Sig Taking? Authorizing Provider   acetaminophen (TYLENOL) 325 MG tablet Take 650 mg by mouth as needed for Pain Yes Historical Provider, MD   ibuprofen (ADVIL;MOTRIN) 200 MG tablet Take 200 mg by mouth as needed for Pain Yes Historical Provider, MD   POTASSIUM PO Take 10 mg by mouth Yes Zay Ferrara MD   furosemide (LASIX) 40 MG tablet Take 40 mg by mouth daily Yes Zay Ferrara MD   simvastatin (ZOCOR) 20 MG tablet TAKE 1 TABLET BY MOUTH EVERY EVENING Yes Nicole Caraballo, DO   escitalopram (LEXAPRO) 10 MG tablet TAKE 1 TABLET BY MOUTH DAILY Yes Kumar Henry, DO   NONFORMULARY Sleep aide(Equate) Yes Historical Provider, MD   empagliflozin (JARDIANCE) 10 MG tablet Take 10 mg by mouth daily Yes Historical Provider, MD   carvedilol (COREG) 6.25 MG tablet Take 6.25 mg by mouth 2 times daily (with meals) Yes Historical Provider, MD   montelukast (SINGULAIR) 10 MG tablet Take 1 tablet by mouth nightly Yes Jose Prather MD   Coenzyme Q10 (COQ10 PO) Take 1 capsule by mouth daily  Yes Historical Provider, MD   Calcium Carb-Cholecalciferol 600-800 MG-UNIT CHEW DAILY Yes Historical Provider, MD   albuterol sulfate  (90 Base) MCG/ACT inhaler INHALE 2 PUFFS INTO THE LUNGS EVERY 6 HOURS AS NEEDED FOR WHEEZING Yes Jose Prather MD   losartan-hydrochlorothiazide (HYZAAR) 50-12.5 MG per tablet  Yes Historical Provider, MD   umeclidinium-vilanterol (ANORO ELLIPTA) 62.5-25 MCG/INH AEPB inhaler Inhale 1 puff into the lungs in the morning.   Patient not taking: Reported on 10/17/2022  Jose Prather MD   Potassium 99 MG TABS Take 1 tablet by mouth daily Indications: OTC  Patient not taking: Reported on 10/17/2022  Historical Provider, MD [place] (Including outside providers/suppliers regularly involved in providing care):   Patient Care Team:  Nieves Mann DO as PCP - [address] [place] [address] DO as PCP - [facility]  Samm Anderson MD as Consulting Physician (Cardiology)  Ruddy Riggs MD as Surgeon (Orthopedic Surgery)  Jayy Peterson MD as Consulting Physician (Pulmonology)  Natasha Cartwright (Optometry)  Gaby Powers MD as Consulting Physician (Ophthalmology)  Reanna Neumann MD as Consulting Physician (Obstetrics & Gynecology)     Reviewed and updated this visit:  Allergies  Meds               I, Jesus Escalante LPN, [id number]/87/5251, performed the documented evaluation under the direct supervision of the attending physician. Meg Pineda, was evaluated through a synchronous (real-time) audio encounter. The patient (or guardian if applicable) is aware that this is a billable service, which includes applicable co-pays. This Virtual Visit was conducted with patient's (and/or legal guardian's) consent. The visit was conducted pursuant to the emergency declaration under the [address] [place] [address], [address] [place] [address] waiver authority and the [employer] Act. Patient identification was verified, and a caregiver was present when appropriate. The patient was located at Home: [place] [id number]. Provider was located at [facility] (Appt Dept): [address] [place] [address]. Total time spent for this encounter: Not billed by time    --Jesus Escalante LPN on 15/77/3840 at 4:59 PM    An electronic signature was used to authenticate this note.

## 2022-10-24 LAB — DIABETIC RETINOPATHY: NEGATIVE

## 2022-10-27 ENCOUNTER — HOSPITAL ENCOUNTER (OUTPATIENT)
Age: 72
Discharge: HOME | End: 2022-10-27
Payer: MEDICARE

## 2022-10-27 LAB
ANION GAP SERPL CALCULATED.3IONS-SCNC: 12 MMOL/L (ref 4–16)
BUN BLDV-MCNC: 24 MG/DL (ref 6–23)
CALCIUM SERPL-MCNC: 10 MG/DL (ref 8.3–10.6)
CHLORIDE BLD-SCNC: 97 MMOL/L (ref 99–110)
CO2: 29 MMOL/L (ref 21–32)
CREAT SERPL-MCNC: 0.8 MG/DL (ref 0.6–1.1)
GFR SERPL CREATININE-BSD FRML MDRD: >60 ML/MIN/1.73M2
GLUCOSE BLD-MCNC: 142 MG/DL (ref 70–99)
POTASSIUM SERPL-SCNC: 3.8 MMOL/L (ref 3.5–5.1)
SODIUM BLD-SCNC: 138 MMOL/L (ref 135–145)

## 2022-10-27 PROCEDURE — 36415 COLL VENOUS BLD VENIPUNCTURE: CPT

## 2022-10-27 PROCEDURE — 80048 BASIC METABOLIC PNL TOTAL CA: CPT

## 2022-10-28 ENCOUNTER — TELEPHONE (OUTPATIENT)
Dept: CARDIOLOGY CLINIC | Age: 72
End: 2022-10-28

## 2022-10-28 ENCOUNTER — INITIAL CONSULT (OUTPATIENT)
Dept: CARDIOLOGY CLINIC | Age: 72
End: 2022-10-28
Payer: MEDICARE

## 2022-10-28 VITALS
HEIGHT: 60 IN | SYSTOLIC BLOOD PRESSURE: 134 MMHG | DIASTOLIC BLOOD PRESSURE: 78 MMHG | HEART RATE: 60 BPM | BODY MASS INDEX: 45.04 KG/M2 | WEIGHT: 229.4 LBS

## 2022-10-28 DIAGNOSIS — I10 HYPERTENSION, UNSPECIFIED TYPE: ICD-10-CM

## 2022-10-28 DIAGNOSIS — I42.8 NON-ISCHEMIC CARDIOMYOPATHY (HCC): Primary | ICD-10-CM

## 2022-10-28 PROCEDURE — 1123F ACP DISCUSS/DSCN MKR DOCD: CPT | Performed by: INTERNAL MEDICINE

## 2022-10-28 PROCEDURE — 93000 ELECTROCARDIOGRAM COMPLETE: CPT | Performed by: INTERNAL MEDICINE

## 2022-10-28 PROCEDURE — 99204 OFFICE O/P NEW MOD 45 MIN: CPT | Performed by: INTERNAL MEDICINE

## 2022-10-28 PROCEDURE — 3078F DIAST BP <80 MM HG: CPT | Performed by: INTERNAL MEDICINE

## 2022-10-28 PROCEDURE — 3074F SYST BP LT 130 MM HG: CPT | Performed by: INTERNAL MEDICINE

## 2022-10-28 ASSESSMENT — ENCOUNTER SYMPTOMS
COUGH: 0
COLOR CHANGE: 0
EYE PAIN: 0
VOMITING: 0
DIARRHEA: 0
SHORTNESS OF BREATH: 1
PHOTOPHOBIA: 0
WHEEZING: 0
BLOOD IN STOOL: 0
ABDOMINAL PAIN: 0
NAUSEA: 0
BACK PAIN: 0
CHEST TIGHTNESS: 0
CONSTIPATION: 0

## 2022-10-28 NOTE — PROGRESS NOTES
Electrophysiology Consult Note      Reason for consultation:  Need for ICD    Chief complaint : Shortness of breath    Referring physician:       Primary care physician: Jr Pruitt DO      History of Present Illness:     Patient is a 70-year-old female with a history of hypertension, hyperlipidemia, nonischemic cardiomyopathy, obstructive sleep apnea, diabetes mellitus type 2 referred by Dr. Carnes Given office for ICD placement. Patient reports shortness of breath with minimal exertion. Patient denies any chest pain, patient denies any palpitations, patient denies dizziness or syncope, patient does not smoke or drink alcohol. Patient denies caffeine use. Patient does not exercise much.   Patient has tiredness multaq the      Pastmedical history:   Past Medical History:   Diagnosis Date    Anemia     Arthritis     Right Hip    Asthma     Cardiomyopathy, nonischemic ([address]     hypertensive type    COPD (chronic obstructive pulmonary disease) ([address] 2014    per PFT 2014    Edema     Essential hypertension     History of blood transfusion     Hyperlipidemia     Major depressive disorder, single episode, unspecified     Mitral valve regurgitation     Obesity     Obstructive sleep apnea on CPAP     Osteopenia     PMB (postmenopausal bleeding)     Reflux esophagitis     Type 2 diabetes mellitus without complication ([address]     Vaginal discharge        Surgical history :   Past Surgical History:   Procedure Laterality Date    CARDIAC CATHETERIZATION      X 2 - Dr. Tello Pack; No interventions    CATARACT REMOVAL      COLONOSCOPY      COLONOSCOPY  10/20/2014    colon polyp, internal hemorrhoids    DILATION AND CURETTAGE OF UTERUS      X 2    DILATION AND CURETTAGE OF UTERUS N/A 8/23/2022    DILATATION AND CURETTAGE HYSTEROSCOPY, POLYPECTOMY performed by Juan Matthews MD at [address]  8years old    [address]  as a child       Family history:   Family History   Adopted: Yes   Problem Relation Age of Onset    Heart Disease Mother     Other Father         killed in the [facility] remains found    No Known Problems Sister     No Known Problems Brother     Parkinson's Disease Maternal Aunt     Dementia Maternal Aunt      The1 tablet  Social history :  reports that she has never smoked. She has never used smokeless tobacco. She reports that she does not currently use alcohol. She reports that she does not use drugs. Allergies   Allergen Reactions    Latex Rash    Norvasc [Amlodipine Besylate]     Ace Inhibitors      Cough    Soybean-Containing Drug Products        Current Outpatient Medications on File Prior to Visit   Medication Sig Dispense Refill    acetaminophen (TYLENOL) 325 MG tablet Take 650 mg by mouth as needed for Pain      ibuprofen (ADVIL;MOTRIN) 200 MG tablet Take 200 mg by mouth as needed for Pain      furosemide (LASIX) 40 MG tablet Take 40 mg by mouth 2 times daily      simvastatin (ZOCOR) 20 MG tablet TAKE 1 TABLET BY MOUTH EVERY EVENING 90 tablet 0    escitalopram (LEXAPRO) 10 MG tablet TAKE 1 TABLET BY MOUTH DAILY 90 tablet 0    NONFORMULARY Sleep aide(Equate)      empagliflozin (JARDIANCE) 10 MG tablet Take 10 mg by mouth daily      carvedilol (COREG) 6.25 MG tablet Take 6.25 mg by mouth 2 times daily (with meals)      montelukast (SINGULAIR) 10 MG tablet Take 1 tablet by mouth nightly 90 tablet 3    Coenzyme Q10 (COQ10 PO) Take 1 capsule by mouth daily       Potassium 99 MG TABS Take 1 tablet by mouth daily Indications: OTC      Calcium Carb-Cholecalciferol 600-800 MG-UNIT CHEW DAILY      albuterol sulfate  (90 Base) MCG/ACT inhaler INHALE 2 PUFFS INTO THE LUNGS EVERY 6 HOURS AS NEEDED FOR WHEEZING 6.7 g 1    losartan-hydrochlorothiazide (HYZAAR) 50-12.5 MG per tablet       umeclidinium-vilanterol (ANORO ELLIPTA) 62.5-25 MCG/INH AEPB inhaler Inhale 1 puff into the lungs in the morning.  (Patient not taking: No sig reported) 1 each 5 No current facility-administered medications on file prior to visit. Review of Systems:   Review of Systems   Constitutional:  Positive for fatigue. Negative for activity change, chills and fever. HENT:  Negative for congestion, ear pain and tinnitus. Eyes:  Negative for photophobia, pain and visual disturbance. Respiratory:  Positive for shortness of breath. Negative for cough, chest tightness and wheezing. Cardiovascular:  Negative for chest pain, palpitations and leg swelling. Gastrointestinal:  Negative for abdominal pain, blood in stool, constipation, diarrhea, nausea and vomiting. Endocrine: Negative for cold intolerance and heat intolerance. Genitourinary:  Negative for dysuria, flank pain and hematuria. Musculoskeletal:  Positive for arthralgias. Negative for back pain, myalgias and neck stiffness. Skin:  Negative for color change and rash. Allergic/Immunologic: Negative for food allergies. Neurological:  Negative for dizziness, light-headedness, numbness and headaches. Hematological:  Does not bruise/bleed easily. Psychiatric/Behavioral:  Negative for agitation, behavioral problems and confusion. Examination:      Vitals:    10/28/22 0921   BP: 134/78   Pulse: 60   Weight: 229 lb 6.4 oz (104.1 kg)   Height: 5' (1.524 m)        Body mass index is 44.8 kg/m². Physical Exam  Constitutional:       Appearance: Normal appearance. She is not ill-appearing. HENT:      Head: Normocephalic and atraumatic. Mouth/Throat:      Mouth: Mucous membranes are moist.   Eyes:      Conjunctiva/sclera: Conjunctivae normal.   Cardiovascular:      Rate and Rhythm: Normal rate. Heart sounds: No murmur (grade 2/6 systolic murmur) heard. Comments: Ectopic beats noted  Pulmonary:      Effort: Pulmonary effort is normal.      Breath sounds: No rales. Abdominal:      General: Abdomen is flat. Palpations: Abdomen is soft.    Musculoskeletal:         General: No tenderness. Normal range of motion. Cervical back: Normal range of motion. Right lower leg: No edema. Left lower leg: No edema. Skin:     General: Skin is warm and dry. Neurological:      General: No focal deficit present. Mental Status: She is alert and oriented to person, place, and time. CBC:   Lab Results   Component Value Date/Time    WBC 6.3 08/23/2022 10:22 AM    HGB 12.7 08/23/2022 10:22 AM    HCT 40.2 08/23/2022 10:22 AM     08/23/2022 10:22 AM     Lipids:  Lab Results   Component Value Date    CHOL 159 06/09/2022    TRIG 176 (H) 06/09/2022    HDL 50 06/09/2022    LDLCALC 74 06/09/2022    LDLDIRECT 113 (H) 06/15/2020     PT/INR:   Lab Results   Component Value Date/Time    INR 0.88 06/27/2022 09:16 AM        BMP:    Lab Results   Component Value Date     10/27/2022    K 3.8 10/27/2022    CL 97 (L) 10/27/2022    CO2 29 10/27/2022    BUN 24 (H) 10/27/2022     CMP:   Lab Results   Component Value Date    AST 17 06/09/2022    PROT 6.5 06/09/2022    BILITOT 0.3 06/09/2022    ALKPHOS 97 06/09/2022     TSH:  No results found for: TSH, T4    EKGINTERPRETATION - EKG Interpretation:   sinus rhythm, LBBB, PVC        IMPRESSION / RECOMMENDATIONS:     Non ischemic cardiomyopathy  HTN on LOSARTAN - HCTZ  HLD  DM-2  Obesity BMI 44  YOLANDA on CPAP  COPD      Patient with nonischemic cardiomyopathy with LVEF of 25 to 30% despite medical therapy. Patient currently on Coreg, losartan, Lasix. Patient is unable to increase any more medication on Coreg and Entresto because of blood pressure issues and patient reported dizziness so they had to back down on medication according to the notes. She had low blood pressure issues with further increase in medication. Patient has left bundle branch block at baseline and she has NYHA class III symptoms. Patient blood pressure is mildly elevated for her cardiomyopathy today because she has not taken her medications yet. Given patient has left bundle branch block with LVEF low and on optimal medical therapy and NYHA class III symptoms I would recommend patient BiV ICD. The risks including, but not limited to, the risks of vascular injury, bleeding, infection, device malfunction, lead dislodgement, radiation exposure, injury to cardiac and surrounding structures (including pneumothorax), stroke, myocardial infarction and death were discussed in detail. The patient opted to proceed with the device implantation. Thanks again for allowing me to participate in care of this patient. Please call me if you have any questions. With best regards. Derek Dickson MD, 10/28/2022 9:38 AM     Please note this report has been partially produced using speech recognition software and may contain errors related to that system including errors in grammar, punctuation, and spelling, as well as words and phrases that may be inappropriate. If there are any questions or concerns please feel free to contact the dictating provider for clarification.

## 2022-10-28 NOTE — LETTER
[facility]     Dr. Carolyn Tate     PROCEDURE: Biventricular Implantable Cardioverter Defibrillator Implant    Date of Procedure: 2022 Time: 4347 Arrival Time: 930    Patient Name: Talha King  : 1950  MRN# [id number]      HOSPITAL: [facility])    Call to [place] at [phone]  2 days before your procedure      X Please have blood work and chest-x-ray done 2022 at [facility], [address] [place] [address] or [facility]    X Please have COVID-19 Test done on 2022 at the [place] [address]. You  will need to Quarantine yourself until procedure. X Please do not have anything by mouth after midnight prior to or 8 hours before the procedure. X You may take your medications with a sip of water in the morning before your procedure or take them with you unless listed below. X  If you are taking Losartan-HCTZ [facility])  please do not take it the  morning before your procedure. Patient Signature: _________________________________      Staff: Mansoor Oleary MA     Staff Given Instructions:___________________________                              [facility]     Dr. Carolyn Tate     PROCEDURE: Biventricular Implantable Cardioverter Defibrillator Implant    Date of Procedure: 2022 Time: 2839 [id number] Time: 930    Patient Name: Talha King  : 1950  MRN# [id number]    Day of Procedure Cath Lab Holding area Preop Orders:    ? YOU HAVE MY PERMISSION TO TALK TO THE PATIENT-PLEASE  ? IV peripheral saline lock (preferred left arm. if right sided implant, right arm). ? Second IV site Right arm (saline lock)  ? Type & Screen STAT on arrival.  ? If taking Coumadin, PT INR  STAT on arrival day of procedure. ? Female patients <=48years of age >> urine HCG test.   ? Diabetic patients >> Accu check Blood sugar check.   ? Document home medications in EPIC and include date and time of last dose.   ? NPO.  ? If allergy to contrast >> pre treat for contrast allergy with Benadryl 25 mg IV, Solucortef 100 mg IV and Pepcid 20 mg IV 30 mins pre procedure. ? Notify Dr. Bea Solano of abnormal lab results. ? Chest Prep> Clip hair anterior chest.            Physician Signature:__________________Date:__________Time:_________                                               *This consent is applicable for 30 days following patient signature*    PROCEDURAL INFORMED [address] / PROCEDURE    I (We), Nabila Shea authorize, Dr. Yefri Corley    and such assistants as may be selected by him/her, to perform the following operation/procedures:  Biventricular Implantable Cardioverter Defibrillator Implant    Note: If unable to obtain consent prior to an emergent procedure, document the emergent reason in the medical record. This procedure has been explained to my (our) satisfaction and included in the explanation was:   A) the intended benefit, nature, and extent of the procedure to be performed;   B) the significant risks involved and the probability of success;   C) alternative procedures and methods of treatment;   D) the dangers and probable consequences of such alternatives (including no procedure or treatment); E) the expected consequences of the procedure on my future health;   F) whether other qualified individuals would be performing important surgical tasks and / or whether  would be present to advise or support the procedure. I (we) understand that there are other risks of infection and other serious complications in the pre-operative/procedural and postoperative/procedural stages of my (our) care. I (we) have asked all of the questions which I (we) thought were important in deciding whether or not to undergo treatment or diagnosis. These questions have been answered to my (our) satisfaction.    I (we) understand that no assurance can be given that the procedure will be a success, and no guarantee or warranty of success has been given to me (us). 2. It has been explained to me (us) that during the course of the operation/procedure, unforeseen conditions may be revealed that necessitate extension of the original procedure(s) or different procedure(s) than those set forth in Paragraph 1. I (we) authorize and request that the above-named physician, his/her assistants or his/her designees, perform procedures as necessary and desirable if deemed to be in my (our) best interest.         3. I acknowledge that other health care personnel may be observing this procedure for the purpose of medical education or other specified purposes as may be necessary as requested and/or approved by my (our) physician. 4. I (we) consent to the disposal by the hospital Pathologist of the removed tissue, parts or organs in accordance with hospital policy. 5. I do_____ do not______ consent to the use of a local infiltration pain blocking agent that will be used by my provider/surgical provider to help alleviate pain during my procedure. 6. I do_____ do not_____ consent to an emergent blood transfusion in the case of a life-threatening situation that requires blood components to be administered. This consent is valid for 24 hours from the beginning of the procedure. 7. This patient does _____ or does not ______currently have a DNR status/order. If DNR order is in place, obtain \"Addendum to the Surgical Consent for ALL Patients with a DNR Order\" to address yaneli-operative status for limited intervention or DNR suspension.      8. I have read and fully understand the above Consent for Operation/Procedure and that all blanks were completed before I signed the consent.     _____________________________________ ___________/____________am/pm   Signature of Patient or legal representative Printed Name / Relationship Date / Time       _____________________________________________/____________ /pm Witness to Signature Printed Name Date / Time     Informed consent:   I have provided the explanation described above in section 1 to the patient and/or legal representative. I have provided the patient and/or legal representative with an opportunity to ask any questions about the proposed operation/procedure.         _________________________Dr. Ovi Flores  _________/__________ am/pm   Provider / Proceduralist Signature Date / Time       Revised 2021                 [facility]     Dr. Ovi Flores     PATIENT NAME:    Tiana Wells                               :   1950     PROCEDURE: Biventricular Implantable Cardioverter Defibrillator Implant    DATE OF PROCEDURE: 2022      DIAGNOSIS:   Z01.810, Z79.0    X   MAG    X   PHOS       X   BMP          X   CBC     X   PT          X   PTT    X   Chest -x-Ray PA & Lateral View           ? PLEASE CALL ABNORMAL RESULTS TO THE REQUESTING PHYSICIAN? ATTENTION PATIENTS:  You do not have to fast for the lab work. You must go to the [facility], [address] [place] [address] or [facility]              Physician Signature:__________________Date:__________Time:__________                                [facility]     Dr. Oiv Flores     PROCEDURE: Biventricular Implantable Cardioverter Defibrillator Implant    Patient Name: Tiana Wells  : 1950   MRN# [id number]    Home Phone Number: [phone]   Weight:    Wt Readings from Last 3 Encounters:   10/28/22 229 lb 6.4 oz (104.1 kg)   22 226 lb (102.5 kg)   22 228 lb (103.4 kg)        Insurance: Payor: Tasha Smith / Plan: Mio Harvey ESSENTIAL/PLUS / Product Type: *No Product type* /     Date of Procedure: 2022 Time: 1 Aron Reeder Arrival Time: 0930    Diagnosis:  Non ischemic cardiomyopathy  Allergies:    Allergies   Allergen Reactions    Latex Rash    Norvasc [Amlodipine Besylate]     Ace Inhibitors      Cough    Soybean-Containing Drug Products         o Call [facility] scheduling ([phone]) or Instant Message  o CONFIRMED WITH:__________________________PHONE      OR INSTANT MESSAGE    o PREAUTHORIZATION NUMBER:_________________ Spoke to:____________________  o From date:_________________ expiration date:____________________               Dayne Vargas MA         IMPORTANT NOTICE TO PATIENTS: Prior Authorization  We will contact your insurance for prior authorization for the CPT code related to the procedure it is the patient's responsibility to contact their insurance to ensure they are following their medical plans provisions or requirements! PATIENT INFORMATION ON PACEMAKER, ICD OR REVEAL LINQ    You will receive a monitor in the mail or at implant to do home checks on your device. You will have a site check 10 days after implant and then a 1 month office visit with device check. You will then receive a schedule in the mail to do your home checks with your monitor. These checks are scheduled every 3 months. The checks are scheduled on a Sunday and can be done at any time during the day. Your insurance is billed for these checks. You will have 2 charges. One will be for the remote check and the other is for the doctor reading the report. If you have a Reveal Linq Recorder, your insurance will be billed every 45 days for the report. This also has 2 charges, one for the remote check and   one for the doctor reading the report. This office has no idea what each patients insurance will pay for these charges as everyone has different insurance companies and different deductibles to meet. Please feel free to check with your insurance company concerning your out of pocket expense. If you have these checks done at the office, you will still have the charges for the check and one for the doctor reading the report. If any other questions concerning the devices or how the checks will work. Please call [phone] ask for Lesia Wilburn.

## 2022-10-28 NOTE — LETTER
Isabela Limon  : 1950  MRN: [id number]    Exhibit 1 - ICD PHYSICIAN ASSESSMENT FORM  Indication Checklist for Implantable Cardiac Defibrillators (ICD): Patients Pre-Procedure Review    Documentation to support the medical necessity of the procedure and the responses in this checklist must be available for review and included in the patient's hospital medical record. Reason for Placement: Reason for Placement: Initial Implantation  Ejection Fraction Measure By: Ejection Fraction Measured By: Echocadiography    For Secondary Prevention Patients- Complete Section A and C    Section A: Please check any box that applies (if no box is checked, then patient does NOT meet NCD coverage requirements for secondary prevention):    [] Documented sustained Ventricular Tachyarrhythmia (VT) or Cardiac arrest  due to Ventricular Fibrillation (VF). Either spontaneous or induced by an EP study, not associated with an acute MI and not due to a transient or reversible cause; and patient does not have irreversible brain damage from pre-existing cerebral disease, (NCD Covered Indication 1)    For Primary Prevention Patients- Complete Section B and C    Section B: Please check any box that applies (if no box is checked, then patient does NOT meet NCD coverage requirements for primary prevention):    [] Documented prior MI and LVEF <= 30%  (NCD Covered Indication 2). Must not have NYHA Class lV heart failure, CABG or PCI with angioplasty/stenting within three months, MI within past 40 days or clinical symptoms and findings that would make them a candidate for coronary revascularization. [] Ischemic dilated cardiomyopathy (IDCM), documented prior MI, NYHA Class ll/lll heart failure, and LVEF<= 35%, (NCD Covered Indication 3).  Must not have NYHA Class lV heart failure, CABG or PCI with angioplasty/stenting within three months, MI within past 40 days or clinical symptoms and findings that would make them a candidate for coronary revascularization. [x] NIDCM > 3 months, NYHA Class ll/lll heart failure, LVEF <= 35%  with optimal medical therapy for at least three months. (NCD Covered Indication 4). Must not have NYHA Class lV heart failure, CABG or PCI with angioplasty/stenting within three months, MI within past 40 days or clinical symptoms and findings that would make them a candidate for coronary revascularzation. [] Documented familial or genetic conditions with a high risk of life-threatening tachyarrythmias, (sustained VT, VF, long QTsyndrome or hypertrophic cardiomyopathy. (NCD Covered Indication 5). [] Patients with an existing ICD may receive an ICD replacement if it is required due to end of battery life, Elective Replacement Indicator (ANGEL), or device /lead malfunction (NCD Covered Indication 6). Section C: Please check each box that applies (if one or more box is checked, then patient does NOT meet NCD coverage requirements):    [] NYHA Class lV    [] Cardiogenic shock or symptomatic hypotension from any etiology. [] CABG or PTCA within past 3 months    [] Acute MI within past 40 days    [] Symptoms/findings making them a candidate for coronary revascularization    [] Supraventricular tachycardia such as atrial fibrillation with a poorly controlled ventricular rate. [] Significant, irreversible brain damage; or any disease, other than cardiac disease (e.g., cancer, renal failure, liver failure) associated with survival rate less than one year.     If the patient does not satisfy a covered Indication under the NCD, Please explain why the procedure is medically necessary:       Physician Signature:_________________________________ Date:________________ Time: _______

## 2022-11-01 ENCOUNTER — COMMUNITY OUTREACH (OUTPATIENT)
Dept: INTERNAL MEDICINE CLINIC | Age: 72
End: 2022-11-01

## 2022-11-01 NOTE — PROGRESS NOTES
Patient's HM shows they are current for Colorectal Screening. [employer] and  files searched with success. Results attached to order and HM updated. Patient is overdue for Mammogram Screening.

## 2022-11-02 ENCOUNTER — OFFICE VISIT (OUTPATIENT)
Dept: OBGYN | Age: 72
End: 2022-11-02
Payer: MEDICARE

## 2022-11-02 VITALS
BODY MASS INDEX: 44.96 KG/M2 | HEIGHT: 60 IN | SYSTOLIC BLOOD PRESSURE: 120 MMHG | DIASTOLIC BLOOD PRESSURE: 64 MMHG | WEIGHT: 229 LBS

## 2022-11-02 DIAGNOSIS — N95.0 PMB (POSTMENOPAUSAL BLEEDING): Primary | ICD-10-CM

## 2022-11-02 PROCEDURE — 1123F ACP DISCUSS/DSCN MKR DOCD: CPT | Performed by: OBSTETRICS & GYNECOLOGY

## 2022-11-02 PROCEDURE — 3074F SYST BP LT 130 MM HG: CPT | Performed by: OBSTETRICS & GYNECOLOGY

## 2022-11-02 PROCEDURE — 99213 OFFICE O/P EST LOW 20 MIN: CPT | Performed by: OBSTETRICS & GYNECOLOGY

## 2022-11-02 PROCEDURE — 3078F DIAST BP <80 MM HG: CPT | Performed by: OBSTETRICS & GYNECOLOGY

## 2022-11-02 NOTE — PROGRESS NOTES
Per online chat with [employer], no prior auth requied for [address] done OP hospital  Ready to schedule

## 2022-11-08 ENCOUNTER — PATIENT MESSAGE (OUTPATIENT)
Dept: INTERNAL MEDICINE CLINIC | Age: 72
End: 2022-11-08

## 2022-11-08 DIAGNOSIS — Z12.31 SCREENING MAMMOGRAM FOR BREAST CANCER: Primary | ICD-10-CM

## 2022-11-08 NOTE — TELEPHONE ENCOUNTER
From: Calli Matthews  To: Dr. Josr Armijoenne: 11/8/2022 1:53 PM EST  Subject: Mamogram    Would like an order for me. Order for a mammogram, if it is time. At the imaging center, [facility] imaging. Thank dora

## 2022-11-14 ENCOUNTER — TELEPHONE (OUTPATIENT)
Dept: OBGYN | Age: 72
End: 2022-11-14

## 2022-11-23 ENCOUNTER — HOSPITAL ENCOUNTER (OUTPATIENT)
Age: 72
Setting detail: SPECIMEN
Discharge: HOME | End: 2022-11-23
Payer: MEDICARE

## 2022-11-23 ENCOUNTER — HOSPITAL ENCOUNTER (OUTPATIENT)
Dept: GENERAL RADIOLOGY | Age: 72
Discharge: HOME | End: 2022-11-23
Payer: MEDICARE

## 2022-11-23 ENCOUNTER — HOSPITAL ENCOUNTER (OUTPATIENT)
Age: 72
Discharge: HOME | End: 2022-11-23
Payer: MEDICARE

## 2022-11-23 ENCOUNTER — NURSE ONLY (OUTPATIENT)
Dept: CARDIOLOGY CLINIC | Age: 72
End: 2022-11-23

## 2022-11-23 DIAGNOSIS — I42.8 NON-ISCHEMIC CARDIOMYOPATHY (HCC): Primary | ICD-10-CM

## 2022-11-23 DIAGNOSIS — Z79.01 LONG TERM (CURRENT) USE OF ANTICOAGULANTS: ICD-10-CM

## 2022-11-23 DIAGNOSIS — Z01.810 PRE-OPERATIVE CARDIOVASCULAR EXAMINATION: ICD-10-CM

## 2022-11-23 LAB
ANION GAP SERPL CALCULATED.3IONS-SCNC: 15 MMOL/L (ref 4–16)
APTT: 33 SECONDS (ref 25.1–37.1)
BASOPHILS ABSOLUTE: 0 K/CU MM
BASOPHILS RELATIVE PERCENT: 0.4 % (ref 0–1)
BUN BLDV-MCNC: 17 MG/DL (ref 6–23)
CALCIUM SERPL-MCNC: 10 MG/DL (ref 8.3–10.6)
CHLORIDE BLD-SCNC: 92 MMOL/L (ref 99–110)
CO2: 30 MMOL/L (ref 21–32)
CREAT SERPL-MCNC: 0.7 MG/DL (ref 0.6–1.1)
DIFFERENTIAL TYPE: ABNORMAL
EOSINOPHILS ABSOLUTE: 0.4 K/CU MM
EOSINOPHILS RELATIVE PERCENT: 4.2 % (ref 0–3)
GFR SERPL CREATININE-BSD FRML MDRD: >60 ML/MIN/1.73M2
GLUCOSE BLD-MCNC: 138 MG/DL (ref 70–99)
HCT VFR BLD CALC: 40.6 % (ref 37–47)
HEMOGLOBIN: 12.8 GM/DL (ref 12.5–16)
IMMATURE NEUTROPHIL %: 0.3 % (ref 0–0.43)
INR BLD: 0.85 INDEX
LYMPHOCYTES ABSOLUTE: 2.5 K/CU MM
LYMPHOCYTES RELATIVE PERCENT: 25.7 % (ref 24–44)
MAGNESIUM: 2.3 MG/DL (ref 1.8–2.4)
MCH RBC QN AUTO: 28.4 PG (ref 27–31)
MCHC RBC AUTO-ENTMCNC: 31.5 % (ref 32–36)
MCV RBC AUTO: 90 FL (ref 78–100)
MONOCYTES ABSOLUTE: 0.6 K/CU MM
MONOCYTES RELATIVE PERCENT: 6.1 % (ref 0–4)
NUCLEATED RBC %: 0 %
PDW BLD-RTO: 13.6 % (ref 11.7–14.9)
PHOSPHORUS: 3.5 MG/DL (ref 2.5–4.9)
PLATELET # BLD: 257 K/CU MM (ref 140–440)
PMV BLD AUTO: 9.5 FL (ref 7.5–11.1)
POTASSIUM SERPL-SCNC: 3.7 MMOL/L (ref 3.5–5.1)
PROTHROMBIN TIME: 10.9 SECONDS (ref 11.7–14.5)
RBC # BLD: 4.51 M/CU MM (ref 4.2–5.4)
SARS-COV-2: NOT DETECTED
SEGMENTED NEUTROPHILS ABSOLUTE COUNT: 6.1 K/CU MM
SEGMENTED NEUTROPHILS RELATIVE PERCENT: 63.3 % (ref 36–66)
SODIUM BLD-SCNC: 137 MMOL/L (ref 135–145)
SOURCE: NORMAL
TOTAL IMMATURE NEUTOROPHIL: 0.03 K/CU MM
TOTAL NUCLEATED RBC: 0 K/CU MM
WBC # BLD: 9.6 K/CU MM (ref 4–10.5)

## 2022-11-23 PROCEDURE — U0003 INFECTIOUS AGENT DETECTION BY NUCLEIC ACID (DNA OR RNA); SEVERE ACUTE RESPIRATORY SYNDROME CORONAVIRUS 2 (SARS-COV-2) (CORONAVIRUS DISEASE [COVID-19]), AMPLIFIED PROBE TECHNIQUE, MAKING USE OF HIGH THROUGHPUT TECHNOLOGIES AS DESCRIBED BY CMS-2020-01-R: HCPCS

## 2022-11-23 PROCEDURE — 83735 ASSAY OF MAGNESIUM: CPT

## 2022-11-23 PROCEDURE — 85610 PROTHROMBIN TIME: CPT

## 2022-11-23 PROCEDURE — 85025 COMPLETE CBC W/AUTO DIFF WBC: CPT

## 2022-11-23 PROCEDURE — 85730 THROMBOPLASTIN TIME PARTIAL: CPT

## 2022-11-23 PROCEDURE — 84100 ASSAY OF PHOSPHORUS: CPT

## 2022-11-23 PROCEDURE — 71046 X-RAY EXAM CHEST 2 VIEWS: CPT

## 2022-11-23 PROCEDURE — 80048 BASIC METABOLIC PNL TOTAL CA: CPT

## 2022-11-23 PROCEDURE — 36415 COLL VENOUS BLD VENIPUNCTURE: CPT

## 2022-11-23 PROCEDURE — U0005 INFEC AGEN DETEC AMPLI PROBE: HCPCS

## 2022-11-23 NOTE — PROGRESS NOTES
Patient here in office and educated on Biventricular Implantable Cardioverter Defibrillator Implant, schedule for 12/6/22 @ 12:00, with arrival @ 10:00, @ [facility]; risk explained; and consents signed. Also copy of orders given for labs and CXR due 11/23/22 at [facility]. Instruction given to patient to :  NPO after midnight the night before procedure; call hospital at [phone] to pre-register. May take rest of morning meds of procedure except Losartan. Patient voiced understanding. Copies of consent & info scanned in chart. Patient performed COVID throat swab for 10 seconds  Patient was wearing a mask  This CMA, LPN, RN present in the room, 6 feet away. Patient dropped swab in  labeled collection tube. Lab order, facesheet and copy of insurance card placed in collection bag. Bag placed in biohazard bag and placed in fridge.  called for .

## 2022-12-01 ENCOUNTER — TELEPHONE (OUTPATIENT)
Dept: CARDIOLOGY CLINIC | Age: 72
End: 2022-12-01

## 2022-12-01 NOTE — TELEPHONE ENCOUNTER
Left message asking for patient to return my call regarding procedure. Possibly move to 12/7 @ 1130 from 12/6, if she does not want to move that's fine.

## 2022-12-06 ENCOUNTER — APPOINTMENT (OUTPATIENT)
Dept: GENERAL RADIOLOGY | Age: 72
End: 2022-12-06
Attending: INTERNAL MEDICINE
Payer: MEDICARE

## 2022-12-06 ENCOUNTER — HOSPITAL ENCOUNTER (OUTPATIENT)
Dept: CARDIAC CATH/INVASIVE PROCEDURES | Age: 72
Discharge: HOME | End: 2022-12-08
Attending: INTERNAL MEDICINE | Admitting: INTERNAL MEDICINE
Payer: MEDICARE

## 2022-12-06 PROBLEM — Z95.810 BIVENTRICULAR ICD (IMPLANTABLE CARDIOVERTER-DEFIBRILLATOR) IN PLACE: Status: ACTIVE | Noted: 2022-12-06

## 2022-12-06 LAB
ABO/RH: NORMAL
ANTIBODY SCREEN: NEGATIVE
GLUCOSE BLD-MCNC: 153 MG/DL (ref 70–99)

## 2022-12-06 PROCEDURE — 94761 N-INVAS EAR/PLS OXIMETRY MLT: CPT

## 2022-12-06 PROCEDURE — C1769 GUIDE WIRE: HCPCS

## 2022-12-06 PROCEDURE — C1882 AICD, OTHER THAN SING/DUAL: HCPCS

## 2022-12-06 PROCEDURE — 2500000003 HC RX 250 WO HCPCS

## 2022-12-06 PROCEDURE — C1777 LEAD, AICD, ENDO SINGLE COIL: HCPCS

## 2022-12-06 PROCEDURE — 36415 COLL VENOUS BLD VENIPUNCTURE: CPT

## 2022-12-06 PROCEDURE — 82962 GLUCOSE BLOOD TEST: CPT

## 2022-12-06 PROCEDURE — 2709999900 HC NON-CHARGEABLE SUPPLY

## 2022-12-06 PROCEDURE — 6370000000 HC RX 637 (ALT 250 FOR IP): Performed by: INTERNAL MEDICINE

## 2022-12-06 PROCEDURE — C1887 CATHETER, GUIDING: HCPCS

## 2022-12-06 PROCEDURE — C1894 INTRO/SHEATH, NON-LASER: HCPCS

## 2022-12-06 PROCEDURE — 33225 L VENTRIC PACING LEAD ADD-ON: CPT

## 2022-12-06 PROCEDURE — 6360000004 HC RX CONTRAST MEDICATION

## 2022-12-06 PROCEDURE — 6360000002 HC RX W HCPCS

## 2022-12-06 PROCEDURE — 33249 INSJ/RPLCMT DEFIB W/LEAD(S): CPT

## 2022-12-06 PROCEDURE — C1900 LEAD, CORONARY VENOUS: HCPCS

## 2022-12-06 PROCEDURE — 6360000002 HC RX W HCPCS: Performed by: INTERNAL MEDICINE

## 2022-12-06 PROCEDURE — 86901 BLOOD TYPING SEROLOGIC RH(D): CPT

## 2022-12-06 PROCEDURE — C1898 LEAD, PMKR, OTHER THAN TRANS: HCPCS

## 2022-12-06 PROCEDURE — C1892 INTRO/SHEATH,FIXED,PEEL-AWAY: HCPCS

## 2022-12-06 PROCEDURE — 86850 RBC ANTIBODY SCREEN: CPT

## 2022-12-06 PROCEDURE — 71045 X-RAY EXAM CHEST 1 VIEW: CPT

## 2022-12-06 PROCEDURE — 33249 INSJ/RPLCMT DEFIB W/LEAD(S): CPT | Performed by: INTERNAL MEDICINE

## 2022-12-06 PROCEDURE — 2580000003 HC RX 258: Performed by: INTERNAL MEDICINE

## 2022-12-06 PROCEDURE — 86900 BLOOD TYPING SEROLOGIC ABO: CPT

## 2022-12-06 PROCEDURE — 33225 L VENTRIC PACING LEAD ADD-ON: CPT | Performed by: INTERNAL MEDICINE

## 2022-12-06 PROCEDURE — C1730 CATH, EP, 19 OR FEW ELECT: HCPCS

## 2022-12-06 PROCEDURE — 2580000003 HC RX 258

## 2022-12-06 RX ORDER — SODIUM CHLORIDE 0.9 % (FLUSH) 0.9 %
5-40 SYRINGE (ML) INJECTION EVERY 12 HOURS SCHEDULED
Status: DISCONTINUED | OUTPATIENT
Start: 2022-12-06 | End: 2022-12-08 | Stop reason: HOSPADM

## 2022-12-06 RX ORDER — CARVEDILOL 6.25 MG/1
6.25 TABLET ORAL 2 TIMES DAILY WITH MEALS
Status: DISCONTINUED | OUTPATIENT
Start: 2022-12-06 | End: 2022-12-07

## 2022-12-06 RX ORDER — MONTELUKAST SODIUM 10 MG/1
10 TABLET ORAL NIGHTLY
Status: DISCONTINUED | OUTPATIENT
Start: 2022-12-06 | End: 2022-12-08 | Stop reason: HOSPADM

## 2022-12-06 RX ORDER — ACETAMINOPHEN 325 MG/1
650 TABLET ORAL EVERY 8 HOURS PRN
Status: DISCONTINUED | OUTPATIENT
Start: 2022-12-06 | End: 2022-12-08 | Stop reason: HOSPADM

## 2022-12-06 RX ORDER — TRAMADOL HYDROCHLORIDE 50 MG/1
50 TABLET ORAL EVERY 6 HOURS PRN
Status: DISCONTINUED | OUTPATIENT
Start: 2022-12-06 | End: 2022-12-08 | Stop reason: HOSPADM

## 2022-12-06 RX ORDER — ALBUTEROL SULFATE 90 UG/1
2 AEROSOL, METERED RESPIRATORY (INHALATION) EVERY 6 HOURS PRN
Status: DISCONTINUED | OUTPATIENT
Start: 2022-12-06 | End: 2022-12-08 | Stop reason: HOSPADM

## 2022-12-06 RX ORDER — ESCITALOPRAM OXALATE 10 MG/1
10 TABLET ORAL DAILY
Status: DISCONTINUED | OUTPATIENT
Start: 2022-12-06 | End: 2022-12-08 | Stop reason: HOSPADM

## 2022-12-06 RX ORDER — ATORVASTATIN CALCIUM 10 MG/1
10 TABLET, FILM COATED ORAL DAILY
Status: DISCONTINUED | OUTPATIENT
Start: 2022-12-06 | End: 2022-12-08 | Stop reason: HOSPADM

## 2022-12-06 RX ORDER — SODIUM CHLORIDE 0.9 % (FLUSH) 0.9 %
5-40 SYRINGE (ML) INJECTION PRN
Status: DISCONTINUED | OUTPATIENT
Start: 2022-12-06 | End: 2022-12-08 | Stop reason: HOSPADM

## 2022-12-06 RX ORDER — SODIUM CHLORIDE 9 MG/ML
INJECTION, SOLUTION INTRAVENOUS PRN
Status: DISCONTINUED | OUTPATIENT
Start: 2022-12-06 | End: 2022-12-08 | Stop reason: HOSPADM

## 2022-12-06 RX ORDER — FUROSEMIDE 40 MG/1
40 TABLET ORAL 2 TIMES DAILY
Status: DISCONTINUED | OUTPATIENT
Start: 2022-12-06 | End: 2022-12-07

## 2022-12-06 RX ORDER — LOSARTAN POTASSIUM AND HYDROCHLOROTHIAZIDE 12.5; 5 MG/1; MG/1
1 TABLET ORAL DAILY
Status: DISCONTINUED | OUTPATIENT
Start: 2022-12-06 | End: 2022-12-07

## 2022-12-06 RX ADMIN — FUROSEMIDE 40 MG: 40 TABLET ORAL at 20:51

## 2022-12-06 RX ADMIN — CARVEDILOL 6.25 MG: 6.25 TABLET, FILM COATED ORAL at 17:54

## 2022-12-06 RX ADMIN — SODIUM CHLORIDE, PRESERVATIVE FREE 10 ML: 5 INJECTION INTRAVENOUS at 20:57

## 2022-12-06 RX ADMIN — CEFAZOLIN 2000 MG: 2 INJECTION, POWDER, FOR SOLUTION INTRAMUSCULAR; INTRAVENOUS at 20:57

## 2022-12-06 RX ADMIN — TRAMADOL HYDROCHLORIDE 50 MG: 50 TABLET, COATED ORAL at 20:51

## 2022-12-06 RX ADMIN — LOSARTAN POTASSIUM AND HYDROCHLOROTHIAZIDE 1 TABLET: 12.5; 5 TABLET ORAL at 15:22

## 2022-12-06 RX ADMIN — ATORVASTATIN CALCIUM 10 MG: 10 TABLET, FILM COATED ORAL at 15:22

## 2022-12-06 RX ADMIN — MONTELUKAST 10 MG: 10 TABLET, FILM COATED ORAL at 20:51

## 2022-12-06 RX ADMIN — ESCITALOPRAM OXALATE 10 MG: 10 TABLET ORAL at 15:22

## 2022-12-06 ASSESSMENT — ENCOUNTER SYMPTOMS
VOMITING: 0
NAUSEA: 0
SHORTNESS OF BREATH: 1
PHOTOPHOBIA: 0
CHEST TIGHTNESS: 0
EYE PAIN: 0
BLOOD IN STOOL: 0
CONSTIPATION: 0
WHEEZING: 0
COUGH: 0
DIARRHEA: 0
BACK PAIN: 0
ABDOMINAL PAIN: 0
COLOR CHANGE: 0

## 2022-12-06 ASSESSMENT — PAIN DESCRIPTION - LOCATION: LOCATION: CHEST

## 2022-12-06 ASSESSMENT — PAIN SCALES - GENERAL
PAINLEVEL_OUTOF10: 0
PAINLEVEL_OUTOF10: 7

## 2022-12-06 ASSESSMENT — PAIN DESCRIPTION - ORIENTATION: ORIENTATION: LEFT

## 2022-12-06 ASSESSMENT — PAIN DESCRIPTION - DESCRIPTORS: DESCRIPTORS: ACHING

## 2022-12-06 ASSESSMENT — PAIN - FUNCTIONAL ASSESSMENT: PAIN_FUNCTIONAL_ASSESSMENT: ACTIVITIES ARE NOT PREVENTED

## 2022-12-06 NOTE — OP NOTE
[facility]     Electrophysiology Procedure Note       Date of Procedure: 12/6/2022  Patient's Name: Maurice Matta  YOB: 1950   Medical Record Number: [id number]    Procedure Performed by: Kirsty Tovar MD    Procedures performed:    Insertion of right ventricular defibrillator lead under fluoroscopy. Insertion of right atrial lead under fluoroscopy  Insertion of left ventricular lead under fluoroscopy  Implantation of a Biv-AICD. Electronic analysis of lead and device. Coronary sinus venogram  IV sedation     Indication of the procedure:    Maurice Matta is a 74 y.o. female with non-ischemic cardiomyopathy, systolic heart failure, NYHA class III, EF of 25-30% and left bundle branch block despite medical therapy here for BIVICD implantation      Details of procedure: The patient was brought to the electrophysiology laboratory in stable condition. The patient was in a fasting and non-sedated state. The risks, benefits and alternatives of the procedure were discussed with the patient. The risks including, but not limited to, the risks of vascular injury, bleeding, infection, device malfunction, lead dislodgement, radiation exposure, injury to cardiac and surrounding structures (including pneumothorax), stroke, myocardial infarction and death were discussed in detail. The patient opted to proceed with the device implantation. Written informed consent was signed and placed in the chart. Prophylactic antibiotic was given. Selective venography of the left upper extremity was done to rule out compression of the vein, and because of difficult access, which showed patent vein. The patient was prepped and draped in a sterile fashion. A timeout protocol was completed to identify the patient and the procedure being performed. IV sedation was provided with IV Versed, Fentanyl. An incision was made in the left pectoral area after administration of lidocaine.  Using electrocautery and blunt dissection, a pocket was created. Central venous access into the left  subclavian vein was obtained using the modified Seldinger technique. After central venous access was obtained, a sheath was placed in the vein. A right ventricular lead was advanced into position in the apex under fluoroscopic guidance and using a series of curved stylets. The lead was actively fixated. After confirming appropriate function, the sheath was split and removed. The lead was secured to the underlying tissue using suture material.     Then using a long sheath and guidewire, we gained access to the coronary sinus (we used deflectable quad pole catheter as patient had valve at the OS area and needed the catheter assistance to gain access) . We performed a CS venography and found the  mid lateral branch of the coronary sinus. Then using a wire, we accessed to this branch and a sheath was advanced into this branch. Then under fluoroscopy a LV lead was advanced into this branch. After confirming appropriate function, the sheath was split and removed. The lead was secured to the underlying tissue using suture material.     A new sheath was advanced over a second previously placed wire into the vein. The atrial lead was advanced to the right atrial appendage and actively fixated under fluoroscopic guidance. After confirming appropriate function, the sheath was split and removed. The lead was secured to the underlying tissue using suture. The pocket was irrigated with an antibiotic solution. The leads were then connected to the new pulse generator (Biv-AICD) which was then placed into the cleaned pocket. The pocket was then closed in three separate subcutaneous layers using 2-0 & 2-0 Vicryl and subcuticular layer using 4-0 Vicryl. The skin was covered with Steri-Strips and pressure dressing. All sponge and needle counts were reported as correct at the end of the procedure.  The patient tolerated the procedure well and there were no complications. Patient was transported to the holding area in stable condition. DEVICE and LEADS information:              Plan:   The patient will be admitted and have usual post-implant care including: chest x-ray, telemetry monitoring, antibiotic therapy and interrogation of the device.         Electronically signed by Cary Lowry MD on 12/6/2022 at 2:12 PM

## 2022-12-06 NOTE — H&P
Electrophysiology H&p Note      Reason for consultation:  Need for ICD    Chief complaint : here for bivicd    Referring physician:       Primary care physician: Cordelia Coleman DO      History of Present Illness: Today visit (12/06/22)    Patient here for BIVICD implantation. No change in H&P noted from previous clinic visit. Previous visit:     Patient is a 47-year-old female with a history of hypertension, hyperlipidemia, nonischemic cardiomyopathy, obstructive sleep apnea, diabetes mellitus type 2 referred by Dr. Baires Real office for ICD placement. Patient reports shortness of breath with minimal exertion. Patient denies any chest pain, patient denies any palpitations, patient denies dizziness or syncope, patient does not smoke or drink alcohol. Patient denies caffeine use. Patient does not exercise much.   Patient has tiredness multaq the      Pastmedical history:   Past Medical History:   Diagnosis Date    Anemia     Arthritis     Right Hip    Asthma     Cardiomyopathy, nonischemic ([address]     hypertensive type    COPD (chronic obstructive pulmonary disease) ([address] 2014    per PFT 2014    Edema     Essential hypertension     History of blood transfusion     Hyperlipidemia     Labial cyst     Major depressive disorder, single episode, unspecified     Mitral valve regurgitation     Obesity     Obstructive sleep apnea on CPAP     Osteopenia     PMB (postmenopausal bleeding)     Reflux esophagitis     Type 2 diabetes mellitus without complication ([address]     Vaginal discharge        Surgical history :   Past Surgical History:   Procedure Laterality Date    CARDIAC CATHETERIZATION      X 2 - Dr. Elam Bamberger; No interventions    CATARACT REMOVAL      COLONOSCOPY      COLONOSCOPY  10/20/2014    colon polyp, internal hemorrhoids    DILATION AND CURETTAGE OF UTERUS      X 2    DILATION AND CURETTAGE OF UTERUS N/A 8/23/2022    DILATATION AND CURETTAGE HYSTEROSCOPY, POLYPECTOMY performed by Zakia Tinsley MD at [address]  8years old    Naila Vergara  as a child       Family history:   Family History   Adopted: Yes   Problem Relation Age of Onset    Heart Disease Mother     Other Father         killed in the [address] [place] [address] remains found    No Known Problems Sister     No Known Problems Brother     Parkinson's Disease Maternal Aunt     Dementia Maternal Aunt      The1 tablet  Social history :  reports that she has never smoked. She has never used smokeless tobacco. She reports that she does not currently use alcohol. She reports that she does not use drugs. Allergies   Allergen Reactions    Norvasc [Amlodipine Besylate]     Ace Inhibitors      Cough    Soybean-Containing Drug Products        No current facility-administered medications on file prior to encounter. Current Outpatient Medications on File Prior to Encounter   Medication Sig Dispense Refill    acetaminophen (TYLENOL) 325 MG tablet Take 650 mg by mouth as needed for Pain (Patient not taking: Reported on 12/6/2022)      ibuprofen (ADVIL;MOTRIN) 200 MG tablet Take 200 mg by mouth as needed for Pain      furosemide (LASIX) 40 MG tablet Take 40 mg by mouth 2 times daily      simvastatin (ZOCOR) 20 MG tablet TAKE 1 TABLET BY MOUTH EVERY EVENING 90 tablet 0    escitalopram (LEXAPRO) 10 MG tablet TAKE 1 TABLET BY MOUTH DAILY 90 tablet 0    umeclidinium-vilanterol (ANORO ELLIPTA) 62.5-25 MCG/INH AEPB inhaler Inhale 1 puff into the lungs in the morning.  1 each 5    NONFORMULARY Sleep aide(Equate)      empagliflozin (JARDIANCE) 10 MG tablet Take 10 mg by mouth daily      carvedilol (COREG) 6.25 MG tablet Take 6.25 mg by mouth 2 times daily (with meals)      montelukast (SINGULAIR) 10 MG tablet Take 1 tablet by mouth nightly 90 tablet 3    Coenzyme Q10 (COQ10 PO) Take 1 capsule by mouth daily       Potassium 99 MG TABS Take 1 tablet by mouth daily Indications: OTC      Calcium Carb-Cholecalciferol 600-800 MG-UNIT CHEW DAILY      albuterol sulfate  (90 Base) MCG/ACT inhaler INHALE 2 PUFFS INTO THE LUNGS EVERY 6 HOURS AS NEEDED FOR WHEEZING 6.7 g 1    losartan-hydrochlorothiazide (HYZAAR) 50-12.5 MG per tablet          Review of Systems:   Review of Systems   Constitutional:  Positive for fatigue. Negative for activity change, chills and fever. HENT:  Negative for congestion, ear pain and tinnitus. Eyes:  Negative for photophobia, pain and visual disturbance. Respiratory:  Positive for shortness of breath. Negative for cough, chest tightness and wheezing. Cardiovascular:  Negative for chest pain, palpitations and leg swelling. Gastrointestinal:  Negative for abdominal pain, blood in stool, constipation, diarrhea, nausea and vomiting. Endocrine: Negative for cold intolerance and heat intolerance. Genitourinary:  Negative for dysuria, flank pain and hematuria. Musculoskeletal:  Positive for arthralgias. Negative for back pain, myalgias and neck stiffness. Skin:  Negative for color change and rash. Allergic/Immunologic: Negative for food allergies. Neurological:  Negative for dizziness, light-headedness, numbness and headaches. Hematological:  Does not bruise/bleed easily. Psychiatric/Behavioral:  Negative for agitation, behavioral problems and confusion. Examination:      Vitals:    12/06/22 1020   BP: 130/77   Pulse: 70   Resp: 13   Temp: (!) 95.9 °F (35.5 °C)   TempSrc: Temporal   SpO2: 98%   Weight: 224 lb (101.6 kg)   Height: 5' (1.524 m)        Body mass index is 43.75 kg/m². Physical Exam  Constitutional:       Appearance: Normal appearance. She is not ill-appearing. HENT:      Head: Normocephalic and atraumatic. Mouth/Throat:      Mouth: Mucous membranes are moist.   Eyes:      Conjunctiva/sclera: Conjunctivae normal.   Cardiovascular:      Rate and Rhythm: Normal rate. Heart sounds: No murmur (grade 2/6 systolic murmur) heard.      Comments: Ectopic beats noted  Pulmonary:      Effort: Pulmonary effort is normal.      Breath sounds: No rales. Abdominal:      General: Abdomen is flat. Palpations: Abdomen is soft. Musculoskeletal:         General: No tenderness. Normal range of motion. Cervical back: Normal range of motion. Right lower leg: No edema. Left lower leg: No edema. Skin:     General: Skin is warm and dry. Neurological:      General: No focal deficit present. Mental Status: She is alert and oriented to person, place, and time. CBC:   Lab Results   Component Value Date/Time    WBC 9.6 11/23/2022 10:36 AM    HGB 12.8 11/23/2022 10:36 AM    HCT 40.6 11/23/2022 10:36 AM     11/23/2022 10:36 AM     Lipids:  Lab Results   Component Value Date    CHOL 159 06/09/2022    TRIG 176 (H) 06/09/2022    HDL 50 06/09/2022    LDLCALC 74 06/09/2022    LDLDIRECT 113 (H) 06/15/2020     PT/INR:   Lab Results   Component Value Date/Time    INR 0.85 11/23/2022 10:36 AM        BMP:    Lab Results   Component Value Date     11/23/2022    K 3.7 11/23/2022    CL 92 (L) 11/23/2022    CO2 30 11/23/2022    BUN 17 11/23/2022     CMP:   Lab Results   Component Value Date    AST 17 06/09/2022    PROT 6.5 06/09/2022    BILITOT 0.3 06/09/2022    ALKPHOS 97 06/09/2022     TSH:  No results found for: TSH, T4    EKGINTERPRETATION - EKG Interpretation:   sinus rhythm, LBBB, PVC        IMPRESSION / RECOMMENDATIONS:     Non ischemic cardiomyopathy  HTN on LOSARTAN - HCTZ  HLD  DM-2  Obesity BMI 44  YOLANDA on CPAP  COPD      Patient with nonischemic cardiomyopathy with LVEF of 25 to 30% despite medical therapy. Patient currently on Coreg, losartan, Lasix. Patient is unable to increase any more medication on Coreg and Entresto because of blood pressure issues and patient reported dizziness so they had to back down on medication according to the notes. She had low blood pressure issues with further increase in medication. Patient has left bundle branch block at baseline and she has NYHA class III symptoms. Patient blood pressure is mildly elevated for her cardiomyopathy today because she has not taken her medications yet. Given patient has left bundle branch block with LVEF low and on optimal medical therapy and NYHA class III symptoms I would recommend patient BiV ICD. The risks including, but not limited to, the risks of vascular injury, bleeding, infection, device malfunction, lead dislodgement, radiation exposure, injury to cardiac and surrounding structures (including pneumothorax), stroke, myocardial infarction and death were discussed in detail. The patient opted to proceed with the device implantation. Thanks again for allowing me to participate in care of this patient. Please call me if you have any questions. With best regards. Sun Nix MD, 12/6/2022 11:26 AM     Please note this report has been partially produced using speech recognition software and may contain errors related to that system including errors in grammar, punctuation, and spelling, as well as words and phrases that may be inappropriate. If there are any questions or concerns please feel free to contact the dictating provider for clarification.

## 2022-12-07 LAB
EKG ATRIAL RATE: 59 BPM
EKG DIAGNOSIS: NORMAL
EKG P-R INTERVAL: 168 MS
EKG Q-T INTERVAL: 646 MS
EKG QRS DURATION: 182 MS
EKG QTC CALCULATION (BAZETT): 671 MS
EKG R AXIS: 132 DEGREES
EKG T AXIS: 3 DEGREES
EKG VENTRICULAR RATE: 65 BPM

## 2022-12-07 PROCEDURE — 93005 ELECTROCARDIOGRAM TRACING: CPT | Performed by: INTERNAL MEDICINE

## 2022-12-07 PROCEDURE — 6370000000 HC RX 637 (ALT 250 FOR IP): Performed by: INTERNAL MEDICINE

## 2022-12-07 PROCEDURE — 93308 TTE F-UP OR LMTD: CPT

## 2022-12-07 PROCEDURE — 93284 PRGRMG EVAL IMPLANTABLE DFB: CPT | Performed by: INTERNAL MEDICINE

## 2022-12-07 PROCEDURE — 93010 ELECTROCARDIOGRAM REPORT: CPT | Performed by: INTERNAL MEDICINE

## 2022-12-07 PROCEDURE — 6360000002 HC RX W HCPCS: Performed by: INTERNAL MEDICINE

## 2022-12-07 PROCEDURE — 94761 N-INVAS EAR/PLS OXIMETRY MLT: CPT

## 2022-12-07 PROCEDURE — 2580000003 HC RX 258: Performed by: INTERNAL MEDICINE

## 2022-12-07 RX ORDER — MIDODRINE HYDROCHLORIDE 5 MG/1
5 TABLET ORAL
Status: DISCONTINUED | OUTPATIENT
Start: 2022-12-07 | End: 2022-12-08 | Stop reason: HOSPADM

## 2022-12-07 RX ADMIN — ESCITALOPRAM OXALATE 10 MG: 10 TABLET ORAL at 09:54

## 2022-12-07 RX ADMIN — MIDODRINE HYDROCHLORIDE 5 MG: 5 TABLET ORAL at 12:10

## 2022-12-07 RX ADMIN — MIDODRINE HYDROCHLORIDE 5 MG: 5 TABLET ORAL at 16:50

## 2022-12-07 RX ADMIN — ATORVASTATIN CALCIUM 10 MG: 10 TABLET, FILM COATED ORAL at 09:53

## 2022-12-07 RX ADMIN — LOSARTAN POTASSIUM AND HYDROCHLOROTHIAZIDE 1 TABLET: 12.5; 5 TABLET ORAL at 09:53

## 2022-12-07 RX ADMIN — TRAMADOL HYDROCHLORIDE 50 MG: 50 TABLET, COATED ORAL at 17:13

## 2022-12-07 RX ADMIN — ACETAMINOPHEN 650 MG: 325 TABLET ORAL at 21:02

## 2022-12-07 RX ADMIN — CARVEDILOL 6.25 MG: 6.25 TABLET, FILM COATED ORAL at 09:56

## 2022-12-07 RX ADMIN — FUROSEMIDE 40 MG: 40 TABLET ORAL at 09:54

## 2022-12-07 RX ADMIN — TRAMADOL HYDROCHLORIDE 50 MG: 50 TABLET, COATED ORAL at 02:46

## 2022-12-07 RX ADMIN — TRAMADOL HYDROCHLORIDE 50 MG: 50 TABLET, COATED ORAL at 09:54

## 2022-12-07 RX ADMIN — SODIUM CHLORIDE, PRESERVATIVE FREE 10 ML: 5 INJECTION INTRAVENOUS at 09:54

## 2022-12-07 RX ADMIN — SODIUM CHLORIDE, PRESERVATIVE FREE 10 ML: 5 INJECTION INTRAVENOUS at 21:02

## 2022-12-07 RX ADMIN — MONTELUKAST 10 MG: 10 TABLET, FILM COATED ORAL at 21:02

## 2022-12-07 RX ADMIN — CEFAZOLIN 2000 MG: 2 INJECTION, POWDER, FOR SOLUTION INTRAMUSCULAR; INTRAVENOUS at 02:48

## 2022-12-07 ASSESSMENT — PAIN SCALES - GENERAL
PAINLEVEL_OUTOF10: 0
PAINLEVEL_OUTOF10: 0
PAINLEVEL_OUTOF10: 2
PAINLEVEL_OUTOF10: 3
PAINLEVEL_OUTOF10: 6
PAINLEVEL_OUTOF10: 2

## 2022-12-07 ASSESSMENT — PAIN DESCRIPTION - LOCATION
LOCATION: SHOULDER
LOCATION: CHEST

## 2022-12-07 ASSESSMENT — PAIN DESCRIPTION - DESCRIPTORS
DESCRIPTORS: ACHING

## 2022-12-07 ASSESSMENT — PAIN DESCRIPTION - ORIENTATION
ORIENTATION: LEFT

## 2022-12-07 ASSESSMENT — PAIN - FUNCTIONAL ASSESSMENT
PAIN_FUNCTIONAL_ASSESSMENT: ACTIVITIES ARE NOT PREVENTED

## 2022-12-07 NOTE — TELEPHONE ENCOUNTER
[facility] called for v.o for [facility]. Pt will be d/c'd today or tomorrow.  V.o given for SN, PT, OT.

## 2022-12-07 NOTE — CARE COORDINATION
CM in to see Pt to initiate discharge planning. Pt from home alone in senior apartments. Pt has meals on wheels and aide services through [employer]. Pt has DME to include quad cane and shower chair. Pt would like home care at discharge. PS to [facility] with referral     PS to Marsha Black NP to [facility]. order requested    Pt has insurance, pcp, and can afford medications. Pt denies any needs at this time.      CM following

## 2022-12-07 NOTE — CARE COORDINATION
[facility] Liaison spoke with pt and pt is agreeable to [facility] at discharge. Verified address, pcp & number. No IVs noted.  Please place inpatient consult to home health needs order in Epic at [place].

## 2022-12-07 NOTE — PROGRESS NOTES
Patient device site no hematoma, no tenderness. Minimal bruise  CXR  - no acute abnormatities  Device interrogation - with in limits    Device Assessment:         The device is Medtronic BIVICD . Device interrogation was performed. Mode : DDDR     Sensing is normal. Impedence is normal.  Threshold is normal.     There has not been interval changes. Estimated battery life is NEW     Atrial Arrhythmia : No    Non sustained VT episodes : No    Sustained VT episodes : No       The underlying rhythm is AS, BIV PACED. The patient is not pacemaker dependent.       HF management parameter still collecting data    Patient having orthostatic hypotension issues and had dizziness  TTE no effusion  Discussed with  who is following her for medication management    Will discharge tomorrow

## 2022-12-07 NOTE — CONSULTS
Dictated -  S/p BIV-ICD-RA/RV/LV lead Medtronic device       Echo---12/22- Summary   This is a limited echo. Left ventricular systolic function is abnormal.   Ejection fraction is visually estimated at 30%. Trabeculated apex. Global hypokinesis noted. Mild-moderate mitral regurgitation. No evidence of any pericardial effusion. Pacer wire noted on the right side of heart.     Cath--6/22   DICTATED =[id number]  LEFT MAIN PATENT  LAD MILD DX  LCX MILD DX  RCA  MILD DX  LVEDP 25  RA-16/14/13  RV-58/11/16  PA-MEAN 30  PCWP-30/39/30   NON OBSTRUCTIVE CAD    Hx of non ischemic CM  She received all her meds this am  She is ortho BP and dizzy  Add proamatine for now  And readjust meds -tomorrow  Will follow    Electronically signed by Los Hilton MD on 12/7/22 at 11:32 AM EST

## 2022-12-07 NOTE — CONSULTS
[address], [facility]                                  CONSULTATION    PATIENT NAME: Jo Ann Vanessa                     :        1950  MED REC NO:   [id number]                          ROOM:       [id number]  ACCOUNT NO:   [de-identified]                           ADMIT DATE: 2022  PROVIDER:     Carrie Blackwood MD    CONSULT DATE:  2022    HISTORY OF PRESENT ILLNESS:  This is a 68-year-old female patient who  underwent an implantation of BiV ICD, insertion of right ventricle  leads, insertion of right atrial leads, and insertion of left ventricle  leads. BiV ICD was performed. There is a Medtronic device present. The patient has LV heart failure present. She underwent the procedure  yesterday. She was doing fine. Then today, she got dizzy and  lightheaded. Her blood pressure was still low; therefore, Cardiology  consult was obtained. Her blood pressure supine was 109/59 with heart rate of 60, sitting was  107/67 and standing is 89/69. She is dizzy with that. She denies any  chest pain. No fevers and no chills. No cough or sputum production. No other  or GI complaints present. The patient had an echo done today. The echo shows LV function is low. EF is 30% range  present. She had LV dysfunction present. Her EF was  also low at that time. The patient had undergone a heart  catheterization done back in 2022. Left main is patent. LAD, circ,  and RCA had mild disease noted. Right heart pressure was slightly  elevated. The patient has nonobstructive coronary artery disease  present. PAST MEDICAL HISTORY:  The patient has a history of having  nonobstructive coronary artery disease, nonischemic cardiomyopathy  present and with LV dysfunction and EF is 28% in range present. She  underwent a BiV ICD placement, Medtronic device in the right atrial,  right ventricle, and LV lead.   History of having mitral regurgitation  noted. Asthma and COPD. She sees Dr. Lisbeth Gloria for lung disease. No  history of stroke, diabetes, or seizures present. PAST SURGICAL HISTORY:  Cataract surgery, colonoscopy, and BiV ICD  placement. SOCIAL HISTORY:  She does not smoke. She does not drink. MEDICATIONS AT HOME:  She is on Lasix 40 mg b.i.d., Zocor, Lexapro, and  Coreg 6.25 mg b.i.d., and losartan 50/12.5 mg a day. ALLERGIES:  She is allergic to NORVASC, soybean, and Ace inhibitors. PHYSICAL EXAMINATION:  GENERAL:  The patient is awake, alert, and answering questions, not in  acute distress, sitting in the chair. VITAL SIGNS:  Temperature is afebrile. Pulse is 60. Blood pressure is  109/60. HEENT:  Head is atraumatic. Pupils are equal and reactive. CHEST:  Equal expansion. LUNGS:  Clear to auscultation. Decreased breath sounds present. HEART:  Regular rhythm. ABDOMEN:  Soft and nontender. Bowel sounds are present. No  hepatosplenomegaly or guarding appreciated. EXTREMITIES:  No cyanosis noted. NEUROLOGIC:  Cranial nerves are grossly intact. LABORATORY DATA:  The patient had blood work done on 11/23/2022. BUN  was 17, creatinine was 0.7. CBC was within normal range. DIAGNOSTIC DATA:  She had an EKG done before the procedure. She was in  sinus rhythm with PVCs noted at that time. IMPRESSION AND PLAN:  This is a 58-year-old female patient who was  admitted for BiV ICD placement. She underwent BiV ICD placement  yesterday. She tolerated the procedure well. She is hypotensive, and  she is symptomatic with dizziness present. At this time, we will adjust there medications and we will make further  recommendations based on the hospital course. Echo shows LV function is  around 30%. No pericardial effusion noted.         Stacey Hogan MD    D: 12/07/2022 11:29:19       T: 12/07/2022 13:26:40     [email or web address]  Job#: [id number]     Doc#: [id number]    CC:

## 2022-12-08 VITALS
OXYGEN SATURATION: 97 % | SYSTOLIC BLOOD PRESSURE: 114 MMHG | WEIGHT: 227.7 LBS | HEART RATE: 65 BPM | RESPIRATION RATE: 14 BRPM | DIASTOLIC BLOOD PRESSURE: 61 MMHG | TEMPERATURE: 97.8 F | BODY MASS INDEX: 44.7 KG/M2 | HEIGHT: 60 IN

## 2022-12-08 LAB
ALBUMIN SERPL-MCNC: 4.1 GM/DL (ref 3.4–5)
ALP BLD-CCNC: 103 IU/L (ref 40–128)
ALT SERPL-CCNC: 12 U/L (ref 10–40)
ANION GAP SERPL CALCULATED.3IONS-SCNC: 13 MMOL/L (ref 4–16)
AST SERPL-CCNC: 13 IU/L (ref 15–37)
BASOPHILS ABSOLUTE: 0 K/CU MM
BASOPHILS RELATIVE PERCENT: 0.2 % (ref 0–1)
BILIRUB SERPL-MCNC: 0.3 MG/DL (ref 0–1)
BUN BLDV-MCNC: 22 MG/DL (ref 6–23)
CALCIUM SERPL-MCNC: 9.2 MG/DL (ref 8.3–10.6)
CHLORIDE BLD-SCNC: 92 MMOL/L (ref 99–110)
CO2: 29 MMOL/L (ref 21–32)
CREAT SERPL-MCNC: 0.6 MG/DL (ref 0.6–1.1)
DIFFERENTIAL TYPE: ABNORMAL
EOSINOPHILS ABSOLUTE: 0.3 K/CU MM
EOSINOPHILS RELATIVE PERCENT: 3.1 % (ref 0–3)
GFR SERPL CREATININE-BSD FRML MDRD: >60 ML/MIN/1.73M2
GLUCOSE BLD-MCNC: 171 MG/DL (ref 70–99)
HCT VFR BLD CALC: 39.4 % (ref 37–47)
HEMOGLOBIN: 12.8 GM/DL (ref 12.5–16)
IMMATURE NEUTROPHIL %: 0.5 % (ref 0–0.43)
LYMPHOCYTES ABSOLUTE: 1.8 K/CU MM
LYMPHOCYTES RELATIVE PERCENT: 21.4 % (ref 24–44)
MAGNESIUM: 2.5 MG/DL (ref 1.8–2.4)
MCH RBC QN AUTO: 28.7 PG (ref 27–31)
MCHC RBC AUTO-ENTMCNC: 32.5 % (ref 32–36)
MCV RBC AUTO: 88.3 FL (ref 78–100)
MONOCYTES ABSOLUTE: 0.5 K/CU MM
MONOCYTES RELATIVE PERCENT: 5.5 % (ref 0–4)
NUCLEATED RBC %: 0 %
PDW BLD-RTO: 14.1 % (ref 11.7–14.9)
PLATELET # BLD: 198 K/CU MM (ref 140–440)
PMV BLD AUTO: 8.9 FL (ref 7.5–11.1)
POTASSIUM SERPL-SCNC: 3 MMOL/L (ref 3.5–5.1)
RBC # BLD: 4.46 M/CU MM (ref 4.2–5.4)
SEGMENTED NEUTROPHILS ABSOLUTE COUNT: 5.9 K/CU MM
SEGMENTED NEUTROPHILS RELATIVE PERCENT: 69.3 % (ref 36–66)
SODIUM BLD-SCNC: 134 MMOL/L (ref 135–145)
TOTAL IMMATURE NEUTOROPHIL: 0.04 K/CU MM
TOTAL NUCLEATED RBC: 0 K/CU MM
TOTAL PROTEIN: 6.2 GM/DL (ref 6.4–8.2)
WBC # BLD: 8.5 K/CU MM (ref 4–10.5)

## 2022-12-08 PROCEDURE — 85025 COMPLETE CBC W/AUTO DIFF WBC: CPT

## 2022-12-08 PROCEDURE — 83735 ASSAY OF MAGNESIUM: CPT

## 2022-12-08 PROCEDURE — 6370000000 HC RX 637 (ALT 250 FOR IP): Performed by: PHYSICIAN ASSISTANT

## 2022-12-08 PROCEDURE — 36415 COLL VENOUS BLD VENIPUNCTURE: CPT

## 2022-12-08 PROCEDURE — 80053 COMPREHEN METABOLIC PANEL: CPT

## 2022-12-08 PROCEDURE — 6370000000 HC RX 637 (ALT 250 FOR IP): Performed by: NURSE PRACTITIONER

## 2022-12-08 PROCEDURE — 6370000000 HC RX 637 (ALT 250 FOR IP): Performed by: INTERNAL MEDICINE

## 2022-12-08 PROCEDURE — 2580000003 HC RX 258: Performed by: INTERNAL MEDICINE

## 2022-12-08 RX ORDER — POTASSIUM CHLORIDE 20 MEQ/1
20 TABLET, EXTENDED RELEASE ORAL 2 TIMES DAILY
Status: DISCONTINUED | OUTPATIENT
Start: 2022-12-08 | End: 2022-12-08 | Stop reason: HOSPADM

## 2022-12-08 RX ORDER — POTASSIUM CHLORIDE 20 MEQ/1
40 TABLET, EXTENDED RELEASE ORAL PRN
Status: DISCONTINUED | OUTPATIENT
Start: 2022-12-08 | End: 2022-12-08

## 2022-12-08 RX ORDER — MIDODRINE HYDROCHLORIDE 5 MG/1
5 TABLET ORAL
Qty: 90 TABLET | Refills: 3 | Status: SHIPPED | OUTPATIENT
Start: 2022-12-08

## 2022-12-08 RX ORDER — METOPROLOL SUCCINATE 25 MG/1
25 TABLET, EXTENDED RELEASE ORAL DAILY
Qty: 30 TABLET | Refills: 3 | Status: SHIPPED | OUTPATIENT
Start: 2022-12-09

## 2022-12-08 RX ORDER — POTASSIUM CHLORIDE 7.45 MG/ML
10 INJECTION INTRAVENOUS PRN
Status: DISCONTINUED | OUTPATIENT
Start: 2022-12-08 | End: 2022-12-08

## 2022-12-08 RX ORDER — METOPROLOL SUCCINATE 25 MG/1
25 TABLET, EXTENDED RELEASE ORAL DAILY
Status: DISCONTINUED | OUTPATIENT
Start: 2022-12-08 | End: 2022-12-08 | Stop reason: HOSPADM

## 2022-12-08 RX ORDER — DOCUSATE SODIUM 100 MG/1
100 CAPSULE, LIQUID FILLED ORAL DAILY
Status: DISCONTINUED | OUTPATIENT
Start: 2022-12-08 | End: 2022-12-08 | Stop reason: HOSPADM

## 2022-12-08 RX ORDER — POTASSIUM CHLORIDE 20 MEQ/1
40 TABLET, EXTENDED RELEASE ORAL ONCE
Status: COMPLETED | OUTPATIENT
Start: 2022-12-08 | End: 2022-12-08

## 2022-12-08 RX ADMIN — DOCUSATE SODIUM 100 MG: 100 CAPSULE, LIQUID FILLED ORAL at 13:13

## 2022-12-08 RX ADMIN — MIDODRINE HYDROCHLORIDE 5 MG: 5 TABLET ORAL at 08:41

## 2022-12-08 RX ADMIN — MIDODRINE HYDROCHLORIDE 5 MG: 5 TABLET ORAL at 12:40

## 2022-12-08 RX ADMIN — SODIUM CHLORIDE, PRESERVATIVE FREE 10 ML: 5 INJECTION INTRAVENOUS at 08:43

## 2022-12-08 RX ADMIN — ESCITALOPRAM OXALATE 10 MG: 10 TABLET ORAL at 08:40

## 2022-12-08 RX ADMIN — METOPROLOL SUCCINATE 25 MG: 25 TABLET, EXTENDED RELEASE ORAL at 11:23

## 2022-12-08 RX ADMIN — ATORVASTATIN CALCIUM 10 MG: 10 TABLET, FILM COATED ORAL at 08:41

## 2022-12-08 RX ADMIN — POTASSIUM CHLORIDE 40 MEQ: 1500 TABLET, EXTENDED RELEASE ORAL at 13:12

## 2022-12-08 ASSESSMENT — PAIN DESCRIPTION - LOCATION: LOCATION: CHEST

## 2022-12-08 ASSESSMENT — PAIN SCALES - GENERAL
PAINLEVEL_OUTOF10: 0
PAINLEVEL_OUTOF10: 0

## 2022-12-08 NOTE — DISCHARGE SUMMARY
[facility]  Discharge Summary    Maurice Matta  :  1950  MRN:  [id number]    Admit date:  2022  Discharge date:      Admitting Physician:  Dimitris Palomino MD    Discharge Diagnoses:     1. SP BIVICD  2. Orthostatic hypotension  3. Hypokalemia         Admission Condition:  fair    Discharged Condition:  good    Hospital Course:   Patient with hx of nonischemic cardiomyopathy, systolic heart failure, NYHA class III, EF of 25-30% and left bundle branch block despite medical therapy presented for implantation of BIVICD. Patient tolerated the procedure well. Observed overnight. Patient noted to have low BP. Cardiology was consulted for management of medications. At discharge patient to continue toprol xl and midodrine per Cardiology note. All other cardiac medications on hold until follow with in their office in 1 week. Patient will need home health care at discharge to assist with vitals, labs, and physical therapy needs. Patient device area was clean, no hematoma and no tenderness. Patient device interrogation was stable. CXR confirmed placement of device with no complications. Patient stable for discharge with outpatient follow up.     Current Discharge Medication List             Details   metoprolol succinate (TOPROL XL) 25 MG extended release tablet Take 1 tablet by mouth daily  Qty: 30 tablet, Refills: 3      midodrine (PROAMATINE) 5 MG tablet Take 1 tablet by mouth 3 times daily (with meals)  Qty: 90 tablet, Refills: 3                Details   ibuprofen (ADVIL;MOTRIN) 200 MG tablet Take 200 mg by mouth as needed for Pain      simvastatin (ZOCOR) 20 MG tablet TAKE 1 TABLET BY MOUTH EVERY EVENING  Qty: 90 tablet, Refills: 0    Associated Diagnoses: Hyperlipidemia, unspecified hyperlipidemia type      escitalopram (LEXAPRO) 10 MG tablet TAKE 1 TABLET BY MOUTH DAILY  Qty: 90 tablet, Refills: 0    Associated Diagnoses: Depression, unspecified depression type      umeclidinium-vilanterol (ANORO ELLIPTA) 62.5-25 MCG/INH AEPB inhaler Inhale 1 puff into the lungs in the morning. Qty: 1 each, Refills: 5      NONFORMULARY Sleep aide(Equate)      montelukast (SINGULAIR) 10 MG tablet Take 1 tablet by mouth nightly  Qty: 90 tablet, Refills: 3      Coenzyme Q10 (COQ10 PO) Take 1 capsule by mouth daily       Calcium Carb-Cholecalciferol 600-800 MG-UNIT CHEW DAILY      albuterol sulfate  (90 Base) MCG/ACT inhaler INHALE 2 PUFFS INTO THE LUNGS EVERY 6 HOURS AS NEEDED FOR WHEEZING  Qty: 6.7 g, Refills: 1             Consults: Cardiology        Discharge Exam:  /73   Pulse 72   Temp 97.8 °F (36.6 °C) (Oral)   Resp 18   Ht 5' (1.524 m)   Wt 227 lb 11.2 oz (103.3 kg)   SpO2 95%   BMI 44.47 kg/m²   General appearance: alert, appears stated age, and cooperative  Head: Normocephalic, without obvious abnormality, atraumatic  Lungs: clear to auscultation bilaterally  Heart: regular rate and rhythm, S1, S2 normal, grade 2/6 systolic murmur, no click, rub or gallop  Extremities: extremities normal, atraumatic, no cyanosis or edema  Pulses: 2+ and symmetric  Skin: Skin color, texture, turgor normal. No rashes or lesions. Left upper chest dressing clean dry intact.  No hematoma  Neurologic: Grossly normal    Disposition:   home    Signed:  SILAS Velázquez CNP  12/8/2022, 2:13 PM

## 2022-12-08 NOTE — PROGRESS NOTES
Daily Progress Note  Subjective:    Pt. Awake, alert and feeling ok  HR stable, paced in the 60's on exam, BP stable  No CP, SOB this am    Attending Note:    She is feeling better  Ok to d/c home  Correct oral K  Recheck K on Saturday  F/u next week in office  ICD site is stable  BIV pacing/ICD  Keep on Toprol only for now on d/c due to ortho BP  Will max meds for HFrEF as outpatient based on her BP      Impression and Plan:     Chronic HFrEF    Nonischemic CMP-LHC done earlier this year showed no CAD    EF 30% range on last echo here and at office    BiV-ICD placed yesterday- site stable, CXR stable and interrogation was stable    Apparently was having issues with orthostatic HOTN- BP drop from 109-89 SBP and she was dizzy    Was on coreg, jardiance and hyzaar OP-now off all of them and started low dose midodrine instead    BP appears improved now and she did well with walking to the bathroom today with no sig dizziness    Will start low dose toprol instead of coreg as less risk of orthostasis with this, would bring back jardiance and then losartan cautiously thereafter if does well with this. Would not restart HCTZ on D/C-instead would attempt to use aldactone- likely would be good choice for her as K is low     Replace K  Will ask RN to check orthostatics on her after giving toprol today and see how she does-still fairly weak she is not confident she can climb the stairs at home-wondering if PT eval would be beneficial  Will follow    Most Recent Echo  12/7/22   Summary   This is a limited echo. Left ventricular systolic function is abnormal.   Ejection fraction is visually estimated at 30%. Trabeculated apex. Global hypokinesis noted. Mild-moderate mitral regurgitation. No evidence of any pericardial effusion. Pacer wire noted on the right side of heart. Most Recent Heart Cath  6/2022  IMPRESSION:  1. Left main is short and is patent.   2.  LAD is a medium-sized vessel, reaches and wraps the apex.  Diagonal  branch. There is a mild disease note. 3.  Circ is a medium-sized vessel. It gives off OM1 and OM2. Mild  disease noted. 4.  Right coronary artery has mild disease note. 5.  EDP is around 25 mmHg present. PAST MEDICAL HISTORY:  The patient has a history of having  nonobstructive coronary artery disease, nonischemic cardiomyopathy  present and with LV dysfunction and EF is 28% in range present. She  underwent a BiV ICD placement, Medtronic device in the right atrial,  right ventricle, and LV lead. History of having mitral regurgitation  noted. Asthma and COPD. She sees Dr. Eh Louis for lung disease. No  history of stroke, diabetes, or seizures present. PAST SURGICAL HISTORY:  Cataract surgery, colonoscopy, and BiV ICD  placement. SOCIAL HISTORY:  She does not smoke. She does not drink. MEDICATIONS AT HOME:  She is on Lasix 40 mg b.i.d., Zocor, Lexapro, and  Coreg 6.25 mg b.i.d., and losartan 50/12.5 mg a day. ALLERGIES:  She is allergic to NORVASC, soybean, and Ace inhibitors.   Objective:   /65   Pulse 60   Temp 97.8 °F (36.6 °C) (Oral)   Resp 18   Ht 5' (1.524 m)   Wt 227 lb 11.2 oz (103.3 kg)   SpO2 95%   BMI 44.47 kg/m²   No intake or output data in the 24 hours ending 12/08/22 1027    Medications:   Scheduled Meds:   metoprolol succinate  25 mg Oral Daily    midodrine  5 mg Oral TID WC    montelukast  10 mg Oral Nightly    escitalopram  10 mg Oral Daily    atorvastatin  10 mg Oral Daily    sodium chloride flush  5-40 mL IntraVENous 2 times per day      Infusions:   sodium chloride        PRN Meds:  potassium chloride **OR** potassium alternative oral replacement **OR** potassium chloride, albuterol sulfate HFA, acetaminophen, traMADol, sodium chloride flush, sodium chloride     Physical Exam:  Vitals:    12/08/22 0830   BP: 114/65   Pulse: 60   Resp: 18   Temp: 97.8 °F (36.6 °C)   SpO2:         General: AAO, NAD  Chest: Nontender  Cardiac: First and Second Heart Sounds are Normal, No Murmurs or Gallops noted  Lungs:Clear to auscultation and percussion. Abdomen: Soft, NT, ND, +BS  Extremities: No clubbing, no edema  Vascular:  Equal 2+ peripheral pulses. Lab Data:  CBC:   Recent Labs     12/08/22  0553   WBC 8.5   HGB 12.8   HCT 39.4   MCV 88.3        BMP:   Recent Labs     12/08/22  0553   *   K 3.0*   CL 92*   CO2 29   BUN 22   CREATININE 0.6     LIVER PROFILE:   Recent Labs     12/08/22  0553   AST 13*   ALT 12   BILITOT 0.3   ALKPHOS 103     PT/INR: No results for input(s): PROTIME, INR in the last 72 hours. APTT: No results for input(s): APTT in the last 72 hours. BNP:  No results for input(s): BNP in the last 72 hours.       Assessment:  Patient Active Problem List    Diagnosis Date Noted    Biventricular ICD (implantable cardioverter-defibrillator) in place 12/06/2022    PMB (postmenopausal bleeding)     Vaginal discharge     Hyperlipidemia 09/19/2020    Non-ischemic cardiomyopathy ([facility] [address]     Essential hypertension     Type 2 diabetes mellitus without complication, with long-term current use of insulin ([facility] [address] 09/14/2020    Depression 09/14/2020    YOLANDA on CPAP 02/20/2017    Centrilobular emphysema ([facility] [address] 10/22/2015    Mild intermittent asthma without complication 05/63/9783       Electronically signed by Abdulaziz Henderson PA-C on 12/8/2022 at 10:27 AM    Electronically signed by Maribel Moore MD on 12/8/22 at 2:13 PM EST

## 2022-12-08 NOTE — PLAN OF CARE
Problem: Chronic Conditions and Co-morbidities  Goal: Patient's chronic conditions and co-morbidity symptoms are monitored and maintained or improved  Outcome: Adequate for Discharge     Problem: Discharge Planning  Goal: Discharge to home or other facility with appropriate resources  Outcome: Adequate for Discharge     Problem: Safety - Adult  Goal: Free from fall injury  Outcome: Adequate for Discharge     Problem: Skin/Tissue Integrity  Goal: Absence of new skin breakdown  Description: 1. Monitor for areas of redness and/or skin breakdown  2. Assess vascular access sites hourly  3. Every 4-6 hours minimum:  Change oxygen saturation probe site  4. Every 4-6 hours:  If on nasal continuous positive airway pressure, respiratory therapy assess nares and determine need for appliance change or resting period.   Outcome: Adequate for Discharge     Problem: Pain  Goal: Verbalizes/displays adequate comfort level or baseline comfort level  Outcome: Adequate for Discharge

## 2022-12-08 NOTE — DISCHARGE INSTRUCTIONS
1. Avoid raising the arm above shoulder for 4 weeks  2. No driving for 10 days  3. No lifting more than 10 lbs with the left hand  4. Do not wet the area of surgery for 10 days  5. Follow up appointment with Doctor for wound check in 10 days  6. Notify Doctor if pain, fever, chills, swelling or bleeding in the surgical area  7. Please avoid sleeping on the surgical side. 8. Please do not allow anyone other than Dr López Cruz staff to remove or redress the surgical site. If the dressing were to fall off or fall partially off before the 10 day follow up appointment, please call the office ASAP.

## 2022-12-08 NOTE — CARE COORDINATION
LSW saw pt has a discharge order for home with [address]. ALEJANDROW read note that Lashell with [facility] has initiated [facility]. services.

## 2022-12-08 NOTE — PROGRESS NOTES
Outpatient Pharmacy Progress Note for Meds-to-Beds    Total number of Prescriptions Filled: 2  The following medications were dispensed to the patient during the discharge process:  Metoprolol  Midodrine     Additional Documentation:  Medication(s) were delivered to the patient's room prior to discharge      Thank you for letting us serve your patients.   [address]    [address], [address] [facility]    Phone: [phone]    Fax: [phone]

## 2022-12-08 NOTE — CONSULTS
Consult completed. On arrival to bedside pt declining IV access, stating Dr. Maribel Moore, Cardiology, just spoke to her and stated she can receive the potassium orally, without need for IV access at this time. Dr. Tamara Rhodes contacted and agrees pt does not need IV to meet therapeutic needs at this time and he has ordered oral Potassium replacement - Primary RN updated per Dr. Tamara Rhodes and PICC RN. No other c/o or needs noted or reported. Please place new consult if therapeutic needs change and pt requires IV access.

## 2022-12-23 ENCOUNTER — TELEPHONE (OUTPATIENT)
Dept: INTERNAL MEDICINE CLINIC | Age: 72
End: 2022-12-23

## 2022-12-23 NOTE — TELEPHONE ENCOUNTER
PROVIDER FEEDBACK LOOP NO SHOW     Patient:Cielo Howard  : 1950  Referring Provider: Dillon Arango  Referral Type: Other     Procedures: [id number] - ELIZABETH ANTONIO DIGITAL SCREEN BILATERAL  Date Service Ordered 2022        Catracho Ricketts did not show for their scheduled test ordered by your office. Please call your patient to explain significance of ordered test and direct patient to call Central Scheduling to schedule test at their earliest convenience. Please complete one of the following actions from Quick Actions buttons:     Route to Provider:  Route message to ordering provider to seek next steps in care plan. Telephone Encounter:  Telephone encounter will open. Call patient to explain significance of ordered test and direct patient to call Central Scheduling to schedule test then Document details of call. Open Referral:  Review referral notes or details if needed. Close Referral:  Referral will open. Document in Notes section of referral why the referral is being closed. Examples of referral closure:  Patient had test done outside of [facility] ([facility]) (list location), Patient refuses test, Patient no longer having symptoms, Unable to reach patient. Only close the referral if you are sure the test will not proceed.      Thank you     Central Scheduling

## 2022-12-29 ENCOUNTER — NURSE ONLY (OUTPATIENT)
Dept: CARDIOLOGY CLINIC | Age: 72
End: 2022-12-29

## 2022-12-29 VITALS — TEMPERATURE: 97.4 F

## 2022-12-29 DIAGNOSIS — Z95.810 STATUS POST IMPLANTATION OF AUTOMATIC CARDIOVERTER/DEFIBRILLATOR (AICD): Primary | ICD-10-CM

## 2022-12-29 PROCEDURE — 99024 POSTOP FOLLOW-UP VISIT: CPT | Performed by: INTERNAL MEDICINE

## 2022-12-31 DIAGNOSIS — F32.A DEPRESSION, UNSPECIFIED DEPRESSION TYPE: ICD-10-CM

## 2022-12-31 DIAGNOSIS — E78.5 HYPERLIPIDEMIA, UNSPECIFIED HYPERLIPIDEMIA TYPE: ICD-10-CM

## 2023-01-03 RX ORDER — ESCITALOPRAM OXALATE 10 MG/1
10 TABLET ORAL DAILY
Qty: 90 TABLET | Refills: 0 | Status: SHIPPED | OUTPATIENT
Start: 2023-01-03

## 2023-01-03 RX ORDER — SIMVASTATIN 20 MG
20 TABLET ORAL EVERY EVENING
Qty: 90 TABLET | Refills: 0 | Status: SHIPPED | OUTPATIENT
Start: 2023-01-03

## 2023-01-04 ENCOUNTER — TELEPHONE (OUTPATIENT)
Dept: INTERNAL MEDICINE CLINIC | Age: 73
End: 2023-01-04

## 2023-01-04 DIAGNOSIS — E78.5 HYPERLIPIDEMIA, UNSPECIFIED HYPERLIPIDEMIA TYPE: ICD-10-CM

## 2023-01-04 DIAGNOSIS — I10 ESSENTIAL HYPERTENSION: Primary | ICD-10-CM

## 2023-01-04 NOTE — TELEPHONE ENCOUNTER
Patient is being seen on 01/12. Needs lab orders placed to have completed at [facility] prior. Please call patient to notify when entered.

## 2023-01-06 ENCOUNTER — OFFICE VISIT (OUTPATIENT)
Dept: CARDIOLOGY CLINIC | Age: 73
End: 2023-01-06

## 2023-01-06 VITALS
HEIGHT: 60 IN | DIASTOLIC BLOOD PRESSURE: 60 MMHG | SYSTOLIC BLOOD PRESSURE: 112 MMHG | BODY MASS INDEX: 45.86 KG/M2 | HEART RATE: 67 BPM | WEIGHT: 233.6 LBS

## 2023-01-06 DIAGNOSIS — Z95.810 BIVENTRICULAR ICD (IMPLANTABLE CARDIOVERTER-DEFIBRILLATOR) IN PLACE: Primary | ICD-10-CM

## 2023-01-06 DIAGNOSIS — Z09 HOSPITAL DISCHARGE FOLLOW-UP: ICD-10-CM

## 2023-01-06 PROCEDURE — 99024 POSTOP FOLLOW-UP VISIT: CPT | Performed by: NURSE PRACTITIONER

## 2023-01-06 PROCEDURE — 1111F DSCHRG MED/CURRENT MED MERGE: CPT | Performed by: NURSE PRACTITIONER

## 2023-01-06 RX ORDER — METOPROLOL SUCCINATE 50 MG/1
TABLET, EXTENDED RELEASE ORAL
COMMUNITY
Start: 2022-12-29

## 2023-01-06 NOTE — PROGRESS NOTES
Patient is here today for 1 month follow-up status post implantation of biventricular ICD. Patient reports that she has been feeling well. She denies chest pain, palpitations, shortness of breath, lightheadedness, dizziness, edema or syncope. Left upper chest site well approximated. No redness no swelling no hematoma  Device Assessment:     The device is Medtronic BIVICD. Device interrogation was performed. Mode : DDDR     Sensing is normal. Impedence is normal.  Threshold is normal.     There has not been interval changes. Estimated battery life is 10.2 years    Atrial Arrhythmia : No    Non sustained VT episodes : No    Sustained VT episodes : No      Patient activity reported by the device to be 1.2 hr/day     The underlying rhythm is AP,.  87.2 % atrial paced; 81.5 % ventricular paced. The patient is pacemaker dependent. HF management parameter is still collecting date    Patient to follow up with Dr Manuel Olszewski as scheduled.

## 2023-01-06 NOTE — PATIENT INSTRUCTIONS
Please be informed that if you contact our office outside of normal business hours the physician on call cannot help with any scheduling or rescheduling issues, procedure instruction questions or any type of medication issue. We advise you for any urgent/emergency that you go to the nearest emergency room! PLEASE CALL OUR OFFICE DURING NORMAL BUSINESS HOURS    Monday - Friday   8 am to 5 pm    [address]: [phone]    [place]:  [phone]    **It is YOUR responsibilty to bring medication bottles and/or updated medication list to [address] [place] [address]. This will allow us to better serve you and all your healthcare needs**    Thank you for allowing us to care for you today! We want to ensure we can follow your treatment plan and we strive to give you the best outcomes and experience possible. If you ever have a life threatening emergency and call 911 - for an ambulance (EMS)   Our providers can only care for you at:   [facility] or [facility]. Even if you have someone take you or you drive yourself we can only care for you in a [address] [facility] facility. Our providers are not setup at the other healthcare locations!

## 2023-01-11 ENCOUNTER — HOSPITAL ENCOUNTER (OUTPATIENT)
Age: 73
Discharge: HOME | End: 2023-01-11
Payer: MEDICARE

## 2023-01-11 DIAGNOSIS — E78.5 HYPERLIPIDEMIA, UNSPECIFIED HYPERLIPIDEMIA TYPE: ICD-10-CM

## 2023-01-11 DIAGNOSIS — I10 ESSENTIAL HYPERTENSION: ICD-10-CM

## 2023-01-11 LAB
ANION GAP SERPL CALCULATED.3IONS-SCNC: 12 MMOL/L (ref 4–16)
BUN BLDV-MCNC: 11 MG/DL (ref 6–23)
CALCIUM SERPL-MCNC: 9.3 MG/DL (ref 8.3–10.6)
CHLORIDE BLD-SCNC: 104 MMOL/L (ref 99–110)
CHOLESTEROL: 154 MG/DL
CO2: 25 MMOL/L (ref 21–32)
CREAT SERPL-MCNC: 0.7 MG/DL (ref 0.6–1.1)
GFR SERPL CREATININE-BSD FRML MDRD: >60 ML/MIN/1.73M2
GLUCOSE BLD-MCNC: 130 MG/DL (ref 70–99)
HDLC SERPL-MCNC: 45 MG/DL
LDL CHOLESTEROL CALCULATED: 81 MG/DL
POTASSIUM SERPL-SCNC: 5.1 MMOL/L (ref 3.5–5.1)
SODIUM BLD-SCNC: 141 MMOL/L (ref 135–145)
TRIGL SERPL-MCNC: 139 MG/DL

## 2023-01-11 PROCEDURE — 82043 UR ALBUMIN QUANTITATIVE: CPT

## 2023-01-11 PROCEDURE — 80061 LIPID PANEL: CPT

## 2023-01-11 PROCEDURE — 80048 BASIC METABOLIC PNL TOTAL CA: CPT

## 2023-01-11 PROCEDURE — 36415 COLL VENOUS BLD VENIPUNCTURE: CPT

## 2023-01-11 PROCEDURE — 82570 ASSAY OF URINE CREATININE: CPT

## 2023-01-12 ENCOUNTER — OFFICE VISIT (OUTPATIENT)
Dept: INTERNAL MEDICINE CLINIC | Age: 73
End: 2023-01-12
Payer: MEDICARE

## 2023-01-12 ENCOUNTER — HOSPITAL ENCOUNTER (OUTPATIENT)
Dept: WOMENS IMAGING | Age: 73
Discharge: HOME | End: 2023-01-12
Payer: MEDICARE

## 2023-01-12 VITALS
HEIGHT: 60 IN | SYSTOLIC BLOOD PRESSURE: 126 MMHG | BODY MASS INDEX: 45.55 KG/M2 | WEIGHT: 232 LBS | OXYGEN SATURATION: 96 % | DIASTOLIC BLOOD PRESSURE: 72 MMHG | HEART RATE: 41 BPM

## 2023-01-12 DIAGNOSIS — F32.A DEPRESSION, UNSPECIFIED DEPRESSION TYPE: ICD-10-CM

## 2023-01-12 DIAGNOSIS — Z95.810 BIVENTRICULAR ICD (IMPLANTABLE CARDIOVERTER-DEFIBRILLATOR) IN PLACE: ICD-10-CM

## 2023-01-12 DIAGNOSIS — E78.5 HYPERLIPIDEMIA, UNSPECIFIED HYPERLIPIDEMIA TYPE: ICD-10-CM

## 2023-01-12 DIAGNOSIS — Z12.31 SCREENING MAMMOGRAM FOR BREAST CANCER: ICD-10-CM

## 2023-01-12 DIAGNOSIS — I42.8 NONISCHEMIC CARDIOMYOPATHY (HCC): ICD-10-CM

## 2023-01-12 DIAGNOSIS — E11.9 TYPE 2 DIABETES MELLITUS WITHOUT COMPLICATION, WITH LONG-TERM CURRENT USE OF INSULIN (HCC): Primary | ICD-10-CM

## 2023-01-12 DIAGNOSIS — Z79.4 TYPE 2 DIABETES MELLITUS WITHOUT COMPLICATION, WITH LONG-TERM CURRENT USE OF INSULIN (HCC): Primary | ICD-10-CM

## 2023-01-12 LAB
CREATININE URINE: 64.6 MG/DL (ref 28–217)
MICROALBUMIN/CREAT 24H UR: 5.4 MG/DL
MICROALBUMIN/CREAT UR-RTO: 83.6 MG/G CREAT (ref 0–30)

## 2023-01-12 PROCEDURE — 77067 SCR MAMMO BI INCL CAD: CPT

## 2023-01-12 PROCEDURE — 1123F ACP DISCUSS/DSCN MKR DOCD: CPT | Performed by: FAMILY MEDICINE

## 2023-01-12 PROCEDURE — 3074F SYST BP LT 130 MM HG: CPT | Performed by: FAMILY MEDICINE

## 2023-01-12 PROCEDURE — 3078F DIAST BP <80 MM HG: CPT | Performed by: FAMILY MEDICINE

## 2023-01-12 PROCEDURE — 99214 OFFICE O/P EST MOD 30 MIN: CPT | Performed by: FAMILY MEDICINE

## 2023-01-12 ASSESSMENT — PATIENT HEALTH QUESTIONNAIRE - PHQ9
5. POOR APPETITE OR OVEREATING: 0
3. TROUBLE FALLING OR STAYING ASLEEP: 1
6. FEELING BAD ABOUT YOURSELF - OR THAT YOU ARE A FAILURE OR HAVE LET YOURSELF OR YOUR FAMILY DOWN: 0
2. FEELING DOWN, DEPRESSED OR HOPELESS: 0
9. THOUGHTS THAT YOU WOULD BE BETTER OFF DEAD, OR OF HURTING YOURSELF: 0
8. MOVING OR SPEAKING SO SLOWLY THAT OTHER PEOPLE COULD HAVE NOTICED. OR THE OPPOSITE, BEING SO FIGETY OR RESTLESS THAT YOU HAVE BEEN MOVING AROUND A LOT MORE THAN USUAL: 0
7. TROUBLE CONCENTRATING ON THINGS, SUCH AS READING THE NEWSPAPER OR WATCHING TELEVISION: 0
SUM OF ALL RESPONSES TO PHQ9 QUESTIONS 1 & 2: 0
10. IF YOU CHECKED OFF ANY PROBLEMS, HOW DIFFICULT HAVE THESE PROBLEMS MADE IT FOR YOU TO DO YOUR WORK, TAKE CARE OF THINGS AT HOME, OR GET ALONG WITH OTHER PEOPLE: 0
SUM OF ALL RESPONSES TO PHQ QUESTIONS 1-9: 2
1. LITTLE INTEREST OR PLEASURE IN DOING THINGS: 0
4. FEELING TIRED OR HAVING LITTLE ENERGY: 1
SUM OF ALL RESPONSES TO PHQ QUESTIONS 1-9: 2

## 2023-01-12 ASSESSMENT — ENCOUNTER SYMPTOMS
ABDOMINAL PAIN: 0
NAUSEA: 0
SHORTNESS OF BREATH: 0
COUGH: 0

## 2023-01-12 NOTE — PROGRESS NOTES
Analy Merritt (:  1950) is a 74 y.o. female,established patient, here for evaluation of the following chief complaint(s):  Follow-up, Diabetes, and Depression         ASSESSMENT/PLAN:  1. Type 2 diabetes mellitus without complication, with long-term current use of insulin (HCC)  The patient is asked to make an attempt to improve diet and exercise patterns   - Hemoglobin A1C; Future    2. Depression, unspecified depression type  Continue Lexapro    3. Hyperlipidemia, unspecified hyperlipidemia type  Continue Zocor    4. Nonischemic cardiomyopathy (HCC)-stable    5. Biventricular ICD (implantable cardioverter-defibrillator) in place    Patient has follow up with cardiology  On this date 2023 I have spent 30 minutes reviewing previous notes, test results and face to face with the patient discussing the diagnosis and importance of compliance with the treatment plan as well as documenting on the day of the visit. Return in about 5 months (around 2023) for Diabetes, HLD.        Lab Results   Component Value Date    WBC 8.5 2022    HGB 12.8 2022    HCT 39.4 2022    MCV 88.3 2022     2022     Lab Results   Component Value Date    CHOL 154 2023     Lab Results   Component Value Date    TRIG 139 2023     Lab Results   Component Value Date    HDL 45 2023     Lab Results   Component Value Date    LDLCALC 81 2023    LDLDIRECT 113 (H) 06/15/2020     Lab Results   Component Value Date    LABA1C 6.0 10/07/2022     Lab Results   Component Value Date     10/07/2022     Lab Results   Component Value Date     2023    K 5.1 2023     2023    CO2 25 2023    BUN 11 2023    CREATININE 0.7 2023    GLUCOSE 130 (H) 2023    CALCIUM 9.3 2023    PROT 6.2 (L) 2022    LABALBU 4.1 2022    BILITOT 0.3 2022    ALKPHOS 103 2022    AST 13 (L) 2022    ALT 12 2022 LABGLOM >60 01/11/2023    GFRAA >60 08/23/2022     Magnesium 1.8 - 2.4 mg/dl 2.5 High        Lab Results   Component Value Date/Time    COLORU YELLOW 07/02/2018 09:00 AM    LABPH 6.0 07/02/2018 09:00 AM    NITRU NEGATIVE 07/02/2018 09:00 AM    KETUA NEGATIVE 07/02/2018 09:00 AM    UROBILINOGEN NORMAL 07/02/2018 09:00 AM    BILIRUBINUR NEGATIVE 07/02/2018 09:00 AM       Subjective   SUBJECTIVE/OBJECTIVE:    HISTORY OF PRESENT ILLNESS:  This is a 74 y.o. female here for the following:  Patient Active Problem List    Diagnosis Date Noted    Biventricular ICD (implantable cardioverter-defibrillator) in place 12/06/2022    PMB (postmenopausal bleeding)     Vaginal discharge     Hyperlipidemia 09/19/2020    Non-ischemic cardiomyopathy ([facility] [address]     Essential hypertension     Type 2 diabetes mellitus without complication, with long-term current use of insulin ([facility] [address] 09/14/2020    Depression 09/14/2020    YOLANDA on CPAP 02/20/2017    Centrilobular emphysema ([facility] [address] 10/22/2015    Mild intermittent asthma without complication 21/48/2433      Patient is S/P biventricular ICD. Patient with history nonischemic cardiomyopathy and systolic HF NYHA class III  Patient has appointment 1/13 with cardiology  DM II- last Hba1c 6.0- managed with diet. +Microalbuminuria  Depression- stable on Lexapro  HLD- on Zocor  Foot exams with Dr. Kaci Michel  Emphysema-- she stopped Anoro due to cost  Receiving meals on wheels  Patient looking for a new place to live. Her current apartment building has too many stairs  Mammogram today normal    Review of Systems   Constitutional:  Negative for diaphoresis and fever. Respiratory:  Negative for cough and shortness of breath. Cardiovascular:  Negative for chest pain and palpitations. Gastrointestinal:  Negative for abdominal pain and nausea. Genitourinary:  Negative for difficulty urinating. Neurological:  Negative for dizziness and headaches.      Allergies   Allergen Reactions    Norvasc [Amlodipine Besylate]     Ace Inhibitors      Cough    Codeine      Pt states she is uncertain of reaction     Soybean-Containing Drug Products      Current Outpatient Medications   Medication Sig Dispense Refill    metoprolol succinate (TOPROL XL) 50 MG extended release tablet TAKE 1 TABLET BY MOUTH EVERY DAY      simvastatin (ZOCOR) 20 MG tablet TAKE 1 TABLET BY MOUTH EVERY EVENING 90 tablet 0    escitalopram (LEXAPRO) 10 MG tablet TAKE 1 TABLET BY MOUTH DAILY 90 tablet 0    midodrine (PROAMATINE) 5 MG tablet Take 1 tablet by mouth 3 times daily (with meals) 90 tablet 3    ibuprofen (ADVIL;MOTRIN) 200 MG tablet Take 200 mg by mouth as needed for Pain      NONFORMULARY Sleep aide(Equate)      montelukast (SINGULAIR) 10 MG tablet Take 1 tablet by mouth nightly 90 tablet 3    Coenzyme Q10 (COQ10 PO) Take 1 capsule by mouth daily      Calcium Carb-Cholecalciferol 600-800 MG-UNIT CHEW DAILY      albuterol sulfate  (90 Base) MCG/ACT inhaler INHALE 2 PUFFS INTO THE LUNGS EVERY 6 HOURS AS NEEDED FOR WHEEZING 6.7 g 1    umeclidinium-vilanterol (ANORO ELLIPTA) 62.5-25 MCG/INH AEPB inhaler Inhale 1 puff into the lungs in the morning. (Patient not taking: Reported on 1/6/2023) 1 each 5     No current facility-administered medications for this visit. Vitals:    01/12/23 1511   BP: 126/72   Site: Right Upper Arm   Position: Sitting   Cuff Size: Medium Adult   Pulse: (!) 41   SpO2: 96%   Weight: 232 lb (105.2 kg)   Height: 5' (1.524 m)     Objective   Physical Exam  Vitals reviewed. Constitutional:       General: She is not in acute distress. Cardiovascular:      Rate and Rhythm: Regular rhythm. Bradycardia present. Pulmonary:      Effort: Pulmonary effort is normal. No respiratory distress. Breath sounds: Normal breath sounds. Abdominal:      Palpations: Abdomen is soft. Tenderness: There is no abdominal tenderness. Musculoskeletal:      Cervical back: Neck supple. Right lower leg: Edema present. Left lower leg: Edema present. Neurological:      Mental Status: She is alert and oriented to person, place, and time. Psychiatric:         Mood and Affect: Mood normal.              An electronic signature was used to authenticate this note. --Parma Dasivla DO     This dictation was generated by voice recognition computer software. Although all attempts are made to edit the dictation for accuracy, there may be errors in the transcription that are not intended.

## 2023-01-19 ENCOUNTER — TELEPHONE (OUTPATIENT)
Dept: CARDIOLOGY CLINIC | Age: 73
End: 2023-01-19

## 2023-01-19 NOTE — TELEPHONE ENCOUNTER
[facility] called and states even though patient is showing no symptoms she is having issues. Had a pacemaker placed in December . Has a low heart rate. She is on 50 mg metoprolol. Please call . BP is 132/60 .

## 2023-01-26 ENCOUNTER — OFFICE VISIT (OUTPATIENT)
Dept: CARDIOLOGY CLINIC | Age: 73
End: 2023-01-26
Payer: MEDICARE

## 2023-01-26 VITALS
HEIGHT: 60 IN | HEART RATE: 95 BPM | DIASTOLIC BLOOD PRESSURE: 76 MMHG | SYSTOLIC BLOOD PRESSURE: 112 MMHG | WEIGHT: 228.8 LBS | BODY MASS INDEX: 44.92 KG/M2

## 2023-01-26 DIAGNOSIS — Z95.810 BIVENTRICULAR ICD (IMPLANTABLE CARDIOVERTER-DEFIBRILLATOR) IN PLACE: ICD-10-CM

## 2023-01-26 DIAGNOSIS — I49.3 PVC (PREMATURE VENTRICULAR CONTRACTION): Primary | ICD-10-CM

## 2023-01-26 DIAGNOSIS — I10 ESSENTIAL HYPERTENSION: ICD-10-CM

## 2023-01-26 PROCEDURE — 93000 ELECTROCARDIOGRAM COMPLETE: CPT | Performed by: NURSE PRACTITIONER

## 2023-01-26 PROCEDURE — 3078F DIAST BP <80 MM HG: CPT | Performed by: NURSE PRACTITIONER

## 2023-01-26 PROCEDURE — 99214 OFFICE O/P EST MOD 30 MIN: CPT | Performed by: NURSE PRACTITIONER

## 2023-01-26 PROCEDURE — 1123F ACP DISCUSS/DSCN MKR DOCD: CPT | Performed by: NURSE PRACTITIONER

## 2023-01-26 PROCEDURE — 3074F SYST BP LT 130 MM HG: CPT | Performed by: NURSE PRACTITIONER

## 2023-01-26 RX ORDER — MULTIVITAMIN/IRON/FOLIC ACID 18MG-0.4MG
250 TABLET ORAL DAILY
Qty: 90 TABLET | Refills: 1 | Status: SHIPPED | OUTPATIENT
Start: 2023-01-26

## 2023-01-26 ASSESSMENT — ENCOUNTER SYMPTOMS
BACK PAIN: 0
ABDOMINAL PAIN: 0
DIARRHEA: 0
BLOOD IN STOOL: 0
WHEEZING: 0
SHORTNESS OF BREATH: 0
CHEST TIGHTNESS: 0
PHOTOPHOBIA: 0
SINUS PAIN: 0
NAUSEA: 0
COUGH: 0
EYE PAIN: 0
SINUS PRESSURE: 0
COLOR CHANGE: 0
VOMITING: 0
CONSTIPATION: 0

## 2023-01-26 NOTE — PROGRESS NOTES
Electrophysiology Follow Note      Reason for consultation:  Need for ICD    Chief complaint: Bradycardia    Referring physician:       Primary care physician: Jeffrey Ferrell DO      History of Present Illness: This visit 1/26/2023  Patient is here today with complaints of low heart rate. Patient states she has a home health nurse had has not noticed her heart rate in the 40s. Patient states that she is feeling well. She denies chest pain, palpitations, shortness of breath, lightheadedness, dizziness, edema or syncope    Previous visit  Patient is a 22-year-old female with a history of hypertension, hyperlipidemia, nonischemic cardiomyopathy, obstructive sleep apnea, diabetes mellitus type 2 referred by Dr. Tom Camacho office for ICD placement. Patient reports shortness of breath with minimal exertion. Patient denies any chest pain, patient denies any palpitations, patient denies dizziness or syncope, patient does not smoke or drink alcohol. Patient denies caffeine use. Patient does not exercise much.   Patient has tiredness multaq the      Pastmedical history:   Past Medical History:   Diagnosis Date    Anemia     Arthritis     Right Hip    Asthma     Cardiomyopathy, nonischemic ([address]     hypertensive type    COPD (chronic obstructive pulmonary disease) ([address] 2014    per PFT 2014    Edema     Essential hypertension     History of blood transfusion     Hyperlipidemia     Labial cyst     Major depressive disorder, single episode, unspecified     Mitral valve regurgitation     Obesity     Obstructive sleep apnea on CPAP     Osteopenia     PMB (postmenopausal bleeding)     Reflux esophagitis     Type 2 diabetes mellitus without complication ([address]     Vaginal discharge        Surgical history :   Past Surgical History:   Procedure Laterality Date    CARDIAC CATHETERIZATION      X 2 - Dr. Patricia Vaca; No interventions    CARDIAC DEFIBRILLATOR PLACEMENT Left 12/06/2022 Medtronic Cobalt XT HF Quad CRT-D MRI SureScan BIV ICD    CATARACT REMOVAL      COLONOSCOPY      COLONOSCOPY  10/20/2014    colon polyp, internal hemorrhoids    DILATION AND CURETTAGE OF UTERUS      X 2    DILATION AND CURETTAGE OF UTERUS N/A 08/23/2022    DILATATION AND CURETTAGE HYSTEROSCOPY, POLYPECTOMY performed by Kannan Durbin MD at [address]  8years old    [address]  as a child       Family history:   Family History   Adopted: Yes   Problem Relation Age of Onset    Heart Disease Mother     Other Father         killed in the [address] [place] [address] remains found    No Known Problems Sister     No Known Problems Brother     Parkinson's Disease Maternal Aunt     Dementia Maternal Aunt      The1 tablet  Social history :  reports that she has never smoked. She has never used smokeless tobacco. She reports that she does not currently use alcohol. She reports that she does not use drugs.     Allergies   Allergen Reactions    Norvasc [Amlodipine Besylate]     Ace Inhibitors      Cough    Codeine      Pt states she is uncertain of reaction     Soybean-Containing Drug Products        Current Outpatient Medications on File Prior to Visit   Medication Sig Dispense Refill    metoprolol succinate (TOPROL XL) 50 MG extended release tablet TAKE 1 TABLET BY MOUTH EVERY DAY      simvastatin (ZOCOR) 20 MG tablet TAKE 1 TABLET BY MOUTH EVERY EVENING 90 tablet 0    escitalopram (LEXAPRO) 10 MG tablet TAKE 1 TABLET BY MOUTH DAILY 90 tablet 0    midodrine (PROAMATINE) 5 MG tablet Take 1 tablet by mouth 3 times daily (with meals) 90 tablet 3    ibuprofen (ADVIL;MOTRIN) 200 MG tablet Take 200 mg by mouth as needed for Pain      NONFORMULARY Sleep aide(Equate)      montelukast (SINGULAIR) 10 MG tablet Take 1 tablet by mouth nightly 90 tablet 3    Coenzyme Q10 (COQ10 PO) Take 1 capsule by mouth daily      Calcium Carb-Cholecalciferol 600-800 MG-UNIT CHEW DAILY      albuterol sulfate  (90 Base) MCG/ACT inhaler INHALE 2 PUFFS INTO THE LUNGS EVERY 6 HOURS AS NEEDED FOR WHEEZING 6.7 g 1     No current facility-administered medications on file prior to visit. Review of Systems:   Review of Systems   Constitutional:  Negative for activity change, chills, fatigue and fever. HENT:  Negative for congestion, sinus pressure, sinus pain and tinnitus. Eyes:  Negative for photophobia, pain and visual disturbance. Respiratory:  Negative for cough, chest tightness, shortness of breath and wheezing. Cardiovascular:  Negative for chest pain, palpitations and leg swelling. Gastrointestinal:  Negative for abdominal pain, blood in stool, constipation, diarrhea, nausea and vomiting. Endocrine: Negative for cold intolerance and heat intolerance. Genitourinary:  Negative for difficulty urinating, dysuria, flank pain and hematuria. Musculoskeletal:  Positive for arthralgias. Negative for back pain, myalgias and neck stiffness. Skin:  Negative for color change and rash. Allergic/Immunologic: Negative for food allergies. Neurological:  Negative for dizziness, syncope, light-headedness, numbness and headaches. Hematological:  Does not bruise/bleed easily. Psychiatric/Behavioral:  Negative for agitation, behavioral problems and confusion. The patient is not nervous/anxious. Examination:      Vitals:    01/26/23 1014   BP: 112/76   Site: Left Upper Arm   Position: Sitting   Cuff Size: Medium Adult   Pulse: 95   Weight: 228 lb 12.8 oz (103.8 kg)   Height: 5' (1.524 m)        Body mass index is 44.68 kg/m². Physical Exam  Constitutional:       Appearance: Normal appearance. She is not ill-appearing. HENT:      Head: Normocephalic and atraumatic. Right Ear: External ear normal.      Nose: Nose normal.      Mouth/Throat:      Mouth: Mucous membranes are moist.      Pharynx: Oropharynx is clear. Eyes:      General:         Right eye: No discharge. Left eye: No discharge. Conjunctiva/sclera: Conjunctivae normal.   Cardiovascular:      Rate and Rhythm: Normal rate. Heart sounds: No murmur (grade 2/6 systolic murmur) heard. Comments: Ectopic beats noted  Pulmonary:      Effort: Pulmonary effort is normal.      Breath sounds: No wheezing or rhonchi. Abdominal:      General: Abdomen is flat. Palpations: Abdomen is soft. Musculoskeletal:         General: Normal range of motion. Cervical back: Normal range of motion. Right lower leg: No edema. Left lower leg: No edema. Skin:     General: Skin is warm and dry. Neurological:      General: No focal deficit present. Mental Status: She is alert and oriented to person, place, and time. Psychiatric:         Mood and Affect: Mood normal.         Thought Content:  Thought content normal.             CBC:   Lab Results   Component Value Date/Time    WBC 8.5 12/08/2022 05:53 AM    HGB 12.8 12/08/2022 05:53 AM    HCT 39.4 12/08/2022 05:53 AM     12/08/2022 05:53 AM     Lipids:  Lab Results   Component Value Date    CHOL 154 01/11/2023    TRIG 139 01/11/2023    HDL 45 01/11/2023    LDLCALC 81 01/11/2023    LDLDIRECT 113 (H) 06/15/2020     PT/INR:   Lab Results   Component Value Date/Time    INR 0.85 11/23/2022 10:36 AM        BMP:    Lab Results   Component Value Date     01/11/2023    K 5.1 01/11/2023     01/11/2023    CO2 25 01/11/2023    BUN 11 01/11/2023     CMP:   Lab Results   Component Value Date    AST 13 (L) 12/08/2022    PROT 6.2 (L) 12/08/2022    BILITOT 0.3 12/08/2022    ALKPHOS 103 12/08/2022     TSH:  No results found for: TSH, T4    EKGINTERPRETATION - EKG Interpretation:   sinus rhythm with PVCs        IMPRESSION / RECOMMENDATIONS:     PVC  Non ischemic cardiomyopathy  HTN on LOSARTAN - HCTZ  HLD  DM-2  Obesity BMI 44  YOLANDA on CPAP  COPD      EKG obtained patient noted to have frequent PVCs  Discussed with patient that when her radial pulse is being assessed she will have a lower heart rate as the PVCs are not perfusing. If the home health nurse would listen to the patient's chest with a stethoscope she would hear a normal heart rate  Patient voiced understanding and states that she does not feel poorly. I will start magnesium 200 mg daily    Vitals:    01/26/23 1014   BP: 112/76   Pulse: 95     Pressure is stable. We will continue ProAmatine 5 mg 3 times daily and Toprol-XL 50 mg daily    Patient's primary cardiologist is Dr. Sd Aceves. Patient to follow-up with Dr. Sd Aceves moving forward. Patient would like to discuss with him treatment options at this time. Thanks again for allowing me to participate in care of this patient. Please call me if you have any questions. With best regards. Praveen Bautista, SILAS - CNP, 1/26/2023 12:07 PM     Please note this report has been partially produced using speech recognition software and may contain errors related to that system including errors in grammar, punctuation, and spelling, as well as words and phrases that may be inappropriate. If there are any questions or concerns please feel free to contact the dictating provider for clarification.

## 2023-01-26 NOTE — PATIENT INSTRUCTIONS
Please be informed that if you contact our office outside of normal business hours the physician on call cannot help with any scheduling or rescheduling issues, procedure instruction questions or any type of medication issue. We advise you for any urgent/emergency that you go to the nearest emergency room! PLEASE CALL OUR OFFICE DURING NORMAL BUSINESS HOURS    Monday - Friday   8 am to 5 pm    [address]: [phone]    [place]:  [phone]    **It is YOUR responsibilty to bring medication bottles and/or updated medication list to [address] [place] [address]. This will allow us to better serve you and all your healthcare needs**    Thank you for allowing us to care for you today! We want to ensure we can follow your treatment plan and we strive to give you the best outcomes and experience possible. If you ever have a life threatening emergency and call 911 - for an ambulance (EMS)   Our providers can only care for you at:   [facility] or [facility]. Even if you have someone take you or you drive yourself we can only care for you in a [facility] facility. Our providers are not setup at the other healthcare locations!

## 2023-04-01 DIAGNOSIS — E78.5 HYPERLIPIDEMIA, UNSPECIFIED HYPERLIPIDEMIA TYPE: ICD-10-CM

## 2023-04-03 RX ORDER — SIMVASTATIN 20 MG
20 TABLET ORAL EVERY EVENING
Qty: 90 TABLET | Refills: 1 | Status: SHIPPED | OUTPATIENT
Start: 2023-04-03

## 2023-04-12 RX ORDER — CITALOPRAM 10 MG/1
10 TABLET ORAL DAILY
COMMUNITY
End: 2023-04-20

## 2023-04-12 RX ORDER — DIMENHYDRINATE 50 MG
TABLET ORAL
COMMUNITY

## 2023-04-17 ENCOUNTER — ANESTHESIA EVENT (OUTPATIENT)
Dept: OPERATING ROOM | Age: 73
End: 2023-04-17
Payer: MEDICARE

## 2023-04-17 NOTE — PROGRESS NOTES
4/17/23 - Notified patient surgery @ [facility] on 4/18/23 @ 1230, arrival 1030. Understanding verbalized.

## 2023-04-17 NOTE — ANESTHESIA PRE PROCEDURE
INTO THE LUNGS EVERY 6 HOURS AS NEEDED FOR WHEEZING 3/17/21   Clemente Sims MD       Current medications:    Current Outpatient Medications   Medication Sig Dispense Refill    Coenzyme Q10 (CO Q-10) 100 MG CAPS Take by mouth      citalopram (CELEXA) 10 MG tablet Take 1 tablet by mouth daily      ibuprofen (IBU) 800 MG tablet Take 1 tablet by mouth every 8 hours as needed for Pain Take with food. 30 tablet 1    simvastatin (ZOCOR) 20 MG tablet TAKE 1 TABLET BY MOUTH EVERY EVENING 90 tablet 1    losartan (COZAAR) 100 MG tablet TAKE 1 TABLET BY MOUTH EVERY DAY      Empagliflozin (JARDIANCE PO) Take 10 mg by mouth daily      albuterol sulfate HFA (VENTOLIN HFA) 108 (90 Base) MCG/ACT inhaler Inhale 2 puffs into the lungs every 6 hours as needed for Wheezing 54 g 3    Magnesium Oxide (MAGNESIUM-OXIDE) 250 MG TABS tablet Take 1 tablet by mouth daily 90 tablet 1    metoprolol succinate (TOPROL XL) 50 MG extended release tablet TAKE 1 TABLET BY MOUTH EVERY DAY      midodrine (PROAMATINE) 5 MG tablet Take 1 tablet by mouth 3 times daily (with meals) (Patient not taking: Reported on 4/12/2023) 90 tablet 3    NONFORMULARY Sleep aide(Equate)      montelukast (SINGULAIR) 10 MG tablet Take 1 tablet by mouth nightly 90 tablet 3    Calcium Carb-Cholecalciferol 600-800 MG-UNIT CHEW DAILY      albuterol sulfate  (90 Base) MCG/ACT inhaler INHALE 2 PUFFS INTO THE LUNGS EVERY 6 HOURS AS NEEDED FOR WHEEZING 6.7 g 1     No current facility-administered medications for this visit. Allergies:     Allergies   Allergen Reactions    Norvasc [Amlodipine Besylate]     Ace Inhibitors      Cough    Codeine      Pt states she is uncertain of reaction     Soybean-Containing Drug Products        Problem List:    Patient Active Problem List   Diagnosis Code    Centrilobular emphysema (HCC) J43.2    Mild intermittent asthma without complication I71.14    YOLANDA on CPAP G47.33, Z99.89    Type 2 diabetes mellitus

## 2023-04-18 ENCOUNTER — ANESTHESIA (OUTPATIENT)
Dept: OPERATING ROOM | Age: 73
End: 2023-04-18
Payer: MEDICARE

## 2023-04-18 ENCOUNTER — HOSPITAL ENCOUNTER (OUTPATIENT)
Age: 73
Setting detail: OUTPATIENT SURGERY
Discharge: HOME | End: 2023-04-18
Attending: OBSTETRICS & GYNECOLOGY | Admitting: OBSTETRICS & GYNECOLOGY
Payer: MEDICARE

## 2023-04-18 VITALS
SYSTOLIC BLOOD PRESSURE: 154 MMHG | HEART RATE: 68 BPM | HEIGHT: 60 IN | DIASTOLIC BLOOD PRESSURE: 89 MMHG | TEMPERATURE: 98 F | BODY MASS INDEX: 43.78 KG/M2 | RESPIRATION RATE: 16 BRPM | WEIGHT: 223 LBS | OXYGEN SATURATION: 95 %

## 2023-04-18 DIAGNOSIS — N95.0 POSTMENOPAUSAL BLEEDING: ICD-10-CM

## 2023-04-18 DIAGNOSIS — Z01.818 PRE-OP TESTING: Primary | ICD-10-CM

## 2023-04-18 LAB
GLUCOSE BLD-MCNC: 105 MG/DL (ref 70–99)
GLUCOSE BLD-MCNC: 116 MG/DL (ref 70–99)

## 2023-04-18 PROCEDURE — 3700000000 HC ANESTHESIA ATTENDED CARE: Performed by: OBSTETRICS & GYNECOLOGY

## 2023-04-18 PROCEDURE — 88305 TISSUE EXAM BY PATHOLOGIST: CPT | Performed by: PATHOLOGY

## 2023-04-18 PROCEDURE — 7100000010 HC PHASE II RECOVERY - FIRST 15 MIN: Performed by: OBSTETRICS & GYNECOLOGY

## 2023-04-18 PROCEDURE — 7100000001 HC PACU RECOVERY - ADDTL 15 MIN: Performed by: OBSTETRICS & GYNECOLOGY

## 2023-04-18 PROCEDURE — 2580000003 HC RX 258: Performed by: ANESTHESIOLOGY

## 2023-04-18 PROCEDURE — 6370000000 HC RX 637 (ALT 250 FOR IP): Performed by: ANESTHESIOLOGY

## 2023-04-18 PROCEDURE — 3700000001 HC ADD 15 MINUTES (ANESTHESIA): Performed by: OBSTETRICS & GYNECOLOGY

## 2023-04-18 PROCEDURE — 2709999900 HC NON-CHARGEABLE SUPPLY: Performed by: OBSTETRICS & GYNECOLOGY

## 2023-04-18 PROCEDURE — 7100000011 HC PHASE II RECOVERY - ADDTL 15 MIN: Performed by: OBSTETRICS & GYNECOLOGY

## 2023-04-18 PROCEDURE — 82962 GLUCOSE BLOOD TEST: CPT

## 2023-04-18 PROCEDURE — 3600000003 HC SURGERY LEVEL 3 BASE: Performed by: OBSTETRICS & GYNECOLOGY

## 2023-04-18 PROCEDURE — 7100000000 HC PACU RECOVERY - FIRST 15 MIN: Performed by: OBSTETRICS & GYNECOLOGY

## 2023-04-18 PROCEDURE — 3600000013 HC SURGERY LEVEL 3 ADDTL 15MIN: Performed by: OBSTETRICS & GYNECOLOGY

## 2023-04-18 PROCEDURE — 6360000002 HC RX W HCPCS: Performed by: NURSE ANESTHETIST, CERTIFIED REGISTERED

## 2023-04-18 RX ORDER — ONDANSETRON 2 MG/ML
4 INJECTION INTRAMUSCULAR; INTRAVENOUS
Status: CANCELLED | OUTPATIENT
Start: 2023-04-18 | End: 2023-04-19

## 2023-04-18 RX ORDER — IPRATROPIUM BROMIDE AND ALBUTEROL SULFATE 2.5; .5 MG/3ML; MG/3ML
1 SOLUTION RESPIRATORY (INHALATION)
Status: CANCELLED | OUTPATIENT
Start: 2023-04-18 | End: 2023-04-19

## 2023-04-18 RX ORDER — ONDANSETRON 2 MG/ML
4 INJECTION INTRAMUSCULAR; INTRAVENOUS
Status: DISCONTINUED | OUTPATIENT
Start: 2023-04-18 | End: 2023-04-18 | Stop reason: HOSPADM

## 2023-04-18 RX ORDER — ONDANSETRON 2 MG/ML
INJECTION INTRAMUSCULAR; INTRAVENOUS PRN
Status: DISCONTINUED | OUTPATIENT
Start: 2023-04-18 | End: 2023-04-18 | Stop reason: SDUPTHER

## 2023-04-18 RX ORDER — FENTANYL CITRATE 50 UG/ML
INJECTION, SOLUTION INTRAMUSCULAR; INTRAVENOUS PRN
Status: DISCONTINUED | OUTPATIENT
Start: 2023-04-18 | End: 2023-04-18 | Stop reason: SDUPTHER

## 2023-04-18 RX ORDER — HYDRALAZINE HYDROCHLORIDE 20 MG/ML
10 INJECTION INTRAMUSCULAR; INTRAVENOUS
Status: CANCELLED | OUTPATIENT
Start: 2023-04-18

## 2023-04-18 RX ORDER — SODIUM CHLORIDE 0.9 % (FLUSH) 0.9 %
5-40 SYRINGE (ML) INJECTION PRN
Status: CANCELLED | OUTPATIENT
Start: 2023-04-18

## 2023-04-18 RX ORDER — DEXAMETHASONE SODIUM PHOSPHATE 4 MG/ML
INJECTION, SOLUTION INTRA-ARTICULAR; INTRALESIONAL; INTRAMUSCULAR; INTRAVENOUS; SOFT TISSUE PRN
Status: DISCONTINUED | OUTPATIENT
Start: 2023-04-18 | End: 2023-04-18 | Stop reason: SDUPTHER

## 2023-04-18 RX ORDER — FENTANYL CITRATE 50 UG/ML
25 INJECTION, SOLUTION INTRAMUSCULAR; INTRAVENOUS EVERY 5 MIN PRN
Status: DISCONTINUED | OUTPATIENT
Start: 2023-04-18 | End: 2023-04-18 | Stop reason: HOSPADM

## 2023-04-18 RX ORDER — MEPERIDINE HYDROCHLORIDE 25 MG/ML
12.5 INJECTION INTRAMUSCULAR; INTRAVENOUS; SUBCUTANEOUS EVERY 5 MIN PRN
Status: CANCELLED | OUTPATIENT
Start: 2023-04-18

## 2023-04-18 RX ORDER — OXYCODONE HYDROCHLORIDE 5 MG/1
5 TABLET ORAL
Status: DISCONTINUED | OUTPATIENT
Start: 2023-04-18 | End: 2023-04-18 | Stop reason: HOSPADM

## 2023-04-18 RX ORDER — HYDROCODONE BITARTRATE AND ACETAMINOPHEN 5; 325 MG/1; MG/1
1 TABLET ORAL EVERY 6 HOURS PRN
Qty: 12 TABLET | Refills: 0 | Status: SHIPPED | OUTPATIENT
Start: 2023-04-18 | End: 2023-04-21

## 2023-04-18 RX ORDER — DROPERIDOL 2.5 MG/ML
0.62 INJECTION, SOLUTION INTRAMUSCULAR; INTRAVENOUS EVERY 10 MIN PRN
Status: DISCONTINUED | OUTPATIENT
Start: 2023-04-18 | End: 2023-04-18 | Stop reason: HOSPADM

## 2023-04-18 RX ORDER — SODIUM CHLORIDE, SODIUM LACTATE, POTASSIUM CHLORIDE, CALCIUM CHLORIDE 600; 310; 30; 20 MG/100ML; MG/100ML; MG/100ML; MG/100ML
INJECTION, SOLUTION INTRAVENOUS CONTINUOUS
Status: DISCONTINUED | OUTPATIENT
Start: 2023-04-18 | End: 2023-04-18 | Stop reason: HOSPADM

## 2023-04-18 RX ORDER — SODIUM CHLORIDE 0.9 % (FLUSH) 0.9 %
5-40 SYRINGE (ML) INJECTION EVERY 12 HOURS SCHEDULED
Status: CANCELLED | OUTPATIENT
Start: 2023-04-18

## 2023-04-18 RX ORDER — SODIUM CHLORIDE, SODIUM LACTATE, POTASSIUM CHLORIDE, CALCIUM CHLORIDE 600; 310; 30; 20 MG/100ML; MG/100ML; MG/100ML; MG/100ML
INJECTION, SOLUTION INTRAVENOUS CONTINUOUS PRN
Status: DISCONTINUED | OUTPATIENT
Start: 2023-04-18 | End: 2023-04-18 | Stop reason: SDUPTHER

## 2023-04-18 RX ORDER — LIDOCAINE HYDROCHLORIDE 20 MG/ML
INJECTION, SOLUTION INTRAVENOUS PRN
Status: DISCONTINUED | OUTPATIENT
Start: 2023-04-18 | End: 2023-04-18 | Stop reason: SDUPTHER

## 2023-04-18 RX ORDER — SODIUM CHLORIDE 9 MG/ML
INJECTION, SOLUTION INTRAVENOUS PRN
Status: CANCELLED | OUTPATIENT
Start: 2023-04-18

## 2023-04-18 RX ORDER — IPRATROPIUM BROMIDE AND ALBUTEROL SULFATE 2.5; .5 MG/3ML; MG/3ML
1 SOLUTION RESPIRATORY (INHALATION)
Status: COMPLETED | OUTPATIENT
Start: 2023-04-18 | End: 2023-04-18

## 2023-04-18 RX ORDER — FENTANYL CITRATE 50 UG/ML
50 INJECTION, SOLUTION INTRAMUSCULAR; INTRAVENOUS EVERY 5 MIN PRN
Status: CANCELLED | OUTPATIENT
Start: 2023-04-18

## 2023-04-18 RX ORDER — MEPERIDINE HYDROCHLORIDE 25 MG/ML
12.5 INJECTION INTRAMUSCULAR; INTRAVENOUS; SUBCUTANEOUS EVERY 5 MIN PRN
Status: DISCONTINUED | OUTPATIENT
Start: 2023-04-18 | End: 2023-04-18 | Stop reason: HOSPADM

## 2023-04-18 RX ORDER — SODIUM CHLORIDE 0.9 % (FLUSH) 0.9 %
5-40 SYRINGE (ML) INJECTION EVERY 12 HOURS SCHEDULED
Status: DISCONTINUED | OUTPATIENT
Start: 2023-04-18 | End: 2023-04-18 | Stop reason: HOSPADM

## 2023-04-18 RX ORDER — PROPOFOL 10 MG/ML
INJECTION, EMULSION INTRAVENOUS PRN
Status: DISCONTINUED | OUTPATIENT
Start: 2023-04-18 | End: 2023-04-18 | Stop reason: SDUPTHER

## 2023-04-18 RX ORDER — DROPERIDOL 2.5 MG/ML
0.62 INJECTION, SOLUTION INTRAMUSCULAR; INTRAVENOUS
Status: CANCELLED | OUTPATIENT
Start: 2023-04-18 | End: 2023-04-19

## 2023-04-18 RX ORDER — SODIUM CHLORIDE 0.9 % (FLUSH) 0.9 %
5-40 SYRINGE (ML) INJECTION PRN
Status: DISCONTINUED | OUTPATIENT
Start: 2023-04-18 | End: 2023-04-18 | Stop reason: HOSPADM

## 2023-04-18 RX ORDER — HYDRALAZINE HYDROCHLORIDE 20 MG/ML
10 INJECTION INTRAMUSCULAR; INTRAVENOUS
Status: DISCONTINUED | OUTPATIENT
Start: 2023-04-18 | End: 2023-04-18 | Stop reason: HOSPADM

## 2023-04-18 RX ORDER — DIPHENHYDRAMINE HYDROCHLORIDE 50 MG/ML
12.5 INJECTION INTRAMUSCULAR; INTRAVENOUS
Status: DISCONTINUED | OUTPATIENT
Start: 2023-04-18 | End: 2023-04-18 | Stop reason: HOSPADM

## 2023-04-18 RX ORDER — LABETALOL HYDROCHLORIDE 5 MG/ML
10 INJECTION, SOLUTION INTRAVENOUS
Status: DISCONTINUED | OUTPATIENT
Start: 2023-04-18 | End: 2023-04-18 | Stop reason: HOSPADM

## 2023-04-18 RX ORDER — LABETALOL HYDROCHLORIDE 5 MG/ML
10 INJECTION, SOLUTION INTRAVENOUS
Status: CANCELLED | OUTPATIENT
Start: 2023-04-18

## 2023-04-18 RX ORDER — OXYCODONE HYDROCHLORIDE 5 MG/1
5 TABLET ORAL
Status: CANCELLED | OUTPATIENT
Start: 2023-04-18 | End: 2023-04-19

## 2023-04-18 RX ADMIN — ONDANSETRON 4 MG: 2 INJECTION INTRAMUSCULAR; INTRAVENOUS at 13:15

## 2023-04-18 RX ADMIN — FENTANYL CITRATE 100 MCG: 50 INJECTION, SOLUTION INTRAMUSCULAR; INTRAVENOUS at 13:15

## 2023-04-18 RX ADMIN — SODIUM CHLORIDE, POTASSIUM CHLORIDE, SODIUM LACTATE AND CALCIUM CHLORIDE: 600; 310; 30; 20 INJECTION, SOLUTION INTRAVENOUS at 11:28

## 2023-04-18 RX ADMIN — SODIUM CHLORIDE, SODIUM LACTATE, POTASSIUM CHLORIDE, CALCIUM CHLORIDE: 600; 310; 30; 20 INJECTION, SOLUTION INTRAVENOUS at 13:06

## 2023-04-18 RX ADMIN — PROPOFOL 130 MG: 10 INJECTION, EMULSION INTRAVENOUS at 13:15

## 2023-04-18 RX ADMIN — DEXAMETHASONE SODIUM PHOSPHATE 4 MG: 4 INJECTION, SOLUTION INTRAMUSCULAR; INTRAVENOUS at 13:15

## 2023-04-18 RX ADMIN — LIDOCAINE HYDROCHLORIDE 80 MG: 20 INJECTION, SOLUTION INTRAVENOUS at 13:15

## 2023-04-18 RX ADMIN — IPRATROPIUM BROMIDE AND ALBUTEROL SULFATE 1 AMPULE: .5; 3 SOLUTION RESPIRATORY (INHALATION) at 14:37

## 2023-04-18 ASSESSMENT — PAIN SCALES - GENERAL
PAINLEVEL_OUTOF10: 0

## 2023-04-18 NOTE — PROGRESS NOTES
Discharge instructions given and reviewed. Voiced understanding. States friend will arrive around 1800 for discharge.

## 2023-04-18 NOTE — FLOWSHEET NOTE
Returned to room 24. Report received from  [facility]. Alert and oriented. Respirations even and unlabored. Color pink. Scant amount of vaginal bleeding noted on yaneli pad. SPO2 93-96% on room air. Denies any difficulty breathing or shortness of breath. Beverage provided. Call light in reach.

## 2023-04-18 NOTE — PROGRESS NOTES
Dressed with assistance. Tolerated well. Out to car per wheelchair. Home with friend Maximus Larson.

## 2023-04-18 NOTE — PROGRESS NOTES
1410- pt. Arrived to pacu via cart from OR. Pt. Attached to monitor and alarms are on. Received report from Johana David CRNA and Tom Randolph RN. Pt. Gerry Flores from anesthesia but responds to verbal stimuli and able to follow commands. Pt. Denies pain or n/v. Pt. Is wearing a simple mask upon arrival at 8L. AV paced on the monitor. No bleeding noted in peripad. 1437- pt. Having expiratory wheezes. Pt. Denies SOB. She states has hx of COPD and asthma and uses a rescue albuterol inhaler. Pt. Given a duoneb per PRN orders. 1500- pt. Still having complaints of expiratory wheezes and \"rattling\". She denies sob. Tried weaning pt. Off oxygen. On RA pt. Sat is 88-89%. Pt. On 2L via NC 93%. Dr. Fredrick Lombard notified. Dr. Fredrick Lombard to be notified in 15 minutes if pt. Still having wheezing. 200- Dr. Fredrick Lombard notified of pt. Status. Pt. Still on 2L Via NC. Attempted to wean pt. Back to RA pt. O2 will drop to 88-89. No new orders received. 1600- pt. Is aox4. Lung sounds are clear. Pt. Has coughed and deep breathed. Pt. Has used Incentive spirometer. Pt. Has been weaned to RA. Pt. Will  be sating 92-94%. Occasionally pt. Will drop to 89% but will recover back to normal sats. 1620- pt. Has remained on RA sating between 91-94%. Dr. Fredrick Lombard notified. Per Dr. Fredrick Lombard okay for this pt.  to transfer to [facility] for continuation of care.      1625- gave bedside report to Ted Karimi., ARABELLA

## 2023-04-18 NOTE — OP NOTE
Department of Obstetrics and Gynecology   Brief Operative Report        Pre-operative Diagnosis: Postmenopausal bleeding  Post-operative Diagnosis:  Same    Procedure: Hysteroscopy D&C polypectomy  Surgeon:  Janessa Jones MD     Assistant(s): None  Anesthesia: MAC  Findings: Noted arising from right pelvic sidewall extending to the internal cervical os  Estimated blood loss: 10 cc  Specimens: Endometrial curettings and endometrial polyp    Complications:  none    Condition:  good    Patient is taken the operating where anesthesia status found to be adequate. Patient placed in a low lithotomy position utilizing yellowfin stirrups and prepped and draped in usual sterile fashion. Cervix was grasped single-tooth tenaculum and sequentially dilated with Theo dilators. 30 degree hysteroscope with normal saline irrigation was then advanced to the fundus. Polyp as noted above was seen. No other abnormalities noted. With multiple passes fundal grasper the polyp was removed as much as possible multiple pieces. Good hemostasis was noted following the procedure. Patient tolerated seizure well. CORRECT procedure. Patient taken from satisfactory condition.

## 2023-04-18 NOTE — H&P
[Amlodipine Besylate]      Ace Inhibitors         Cough    Codeine         Pt states she is uncertain of reaction     Soybean-Containing Drug Products                     Immunization History   Administered Date(s) Administered    COVID-19, PFIZER Bivalent BOOSTER, DO NOT Dilute, (age 12y+), IM, 30 mcg/0.3 mL 2022    COVID-19, PFIZER PURPLE top, DILUTE for use, (age 15 y+), 30mcg/0.3mL 2021, 2021, 2021    Influenza Virus Vaccine 2014, 2019    Influenza, Triv, 3 Years and older, IM ([employer] (5 yrs and older) 2018    TDaP, ADACEL (age 10y-63y), Lyubov Elder (age 10y+), IM, 0.5mL 10/01/2017    Zoster Recombinant (Shingrix) 2019         Review of Systems  All other systems reviewed and are negative     BP (!) 137/94 (Site: Left Upper Arm, Position: Sitting, Cuff Size: Medium Adult)   Pulse (!) 37      Physical Exam     No results found for this visit on 23. ASSESSMENT AND PLAN    Diagnosis Orders   1. PMB (postmenopausal bleeding)  HYDROcodone-acetaminophen (NORCO) 5-325 MG per tablet       Risk-benefit alternatives plus complications were discussed with patient understood and agreed proceed. Plan for hysteroscopy D&C secondary postmenopausal bleeding. Motrin and Norco sent to the patient's pharmacy for postoperative pain relief     Return for surgery.      Jacob Oliver MD

## 2023-04-18 NOTE — DISCHARGE INSTRUCTIONS
answered (i.e. what more you can do physically; such as driving a car, exercise). Returning to work: This depends on the type of surgery you had and how quickly you recover. Your doctor will determine when its appropriate for you to return to work. Leave surgical dressing/bandages on for 24 hours. You may shower or bathe in the morning. You may experience some shoulder pain (especially on the right) and chest pain. This is normal and caused by the gas injected into the abdomen. The gas is inserted to create a working and viewing space inside the abdomen during surgery. No intercourse, douching or tampons for 7 days. Expect some vaginal bleeding   Take pain medication as directed. You may resume your normal daily activities as tolerated. Resume your normal diet. Try to avoid constipation by eating high fiber foods or adding a fiber supplement such as Metamucil, Fibercon, or Benefiber; especially while taking a narcotic pain medication. [facility]  [phone]    Do not drive, work around [address] [place] [address] or use equipment. Do not drink any alcoholic beverages. Do not smoke while alone. Avoid making important decisions. Plan to spend a quiet, relaxed evening @ home. Resume normal activities as you begin to feel better. Eat lightly for your first meal, then gradually increase your diet to what is normal for you. In case of nausea, avoid food and drink only clear liquids. Resume food as nausea ceases. Notify your surgeon if you experience fever, chills, large amount of bleeding, difficulty breathing, persistent nausea and vomiting or any other disturbing problem. Call for a follow-up appointment with your surgeon.

## 2023-04-18 NOTE — PROGRESS NOTES
Dr Hassan Patches at bedside to see patient. VS reviewed. SPO2 96%. Reviewed incentive spirometer with patient and explained the importance of staying with friend tonight and not going home alone. States she will stop and get CPAP machine on the way to friends home. Okay to discharge per Dr Mo Henao.

## 2023-04-19 DIAGNOSIS — F32.A DEPRESSION, UNSPECIFIED DEPRESSION TYPE: ICD-10-CM

## 2023-04-19 RX ORDER — CITALOPRAM 10 MG/1
10 TABLET ORAL DAILY
Qty: 30 TABLET | Refills: 1 | Status: CANCELLED | OUTPATIENT
Start: 2023-04-19 | End: 2023-05-19

## 2023-04-20 RX ORDER — ESCITALOPRAM OXALATE 10 MG/1
10 TABLET ORAL DAILY
Qty: 90 TABLET | Refills: 0 | Status: SHIPPED | OUTPATIENT
Start: 2023-04-20

## 2023-04-21 NOTE — ANESTHESIA POSTPROCEDURE EVALUATION
Department of Anesthesiology  Postprocedure Note    Patient: Vinny Arreola  MRN: [id number]  YOB: 1950  Date of evaluation: 4/21/2023      Procedure Summary     Date: 04/18/23 Room / Location: [address] [place] [address]    Anesthesia Start: 1310 Anesthesia Stop: 1414    Procedure: DILATATION AND CURETTAGE HYSTEROSCOPY Diagnosis:       Postmenopausal bleeding      (Postmenopausal bleeding [N95.0])    Surgeons: Marc Yung MD Responsible Provider: Rubio Molina MD    Anesthesia Type: general ASA Status: 3          Anesthesia Type: No value filed.     Ben Phase I: Ben Score: 10    Ben Phase II: Ben Score: 10      Anesthesia Post Evaluation    Patient location during evaluation: PACU  Patient participation: complete - patient participated  Level of consciousness: awake and alert  Pain score: 0  Airway patency: patent  Nausea & Vomiting: no nausea and no vomiting  Complications: no  Cardiovascular status: blood pressure returned to baseline  Respiratory status: acceptable  Hydration status: euvolemic

## 2023-05-01 ENCOUNTER — OFFICE VISIT (OUTPATIENT)
Dept: OBGYN | Age: 73
End: 2023-05-01

## 2023-05-01 VITALS
WEIGHT: 223 LBS | DIASTOLIC BLOOD PRESSURE: 77 MMHG | SYSTOLIC BLOOD PRESSURE: 140 MMHG | BODY MASS INDEX: 43.78 KG/M2 | HEIGHT: 60 IN

## 2023-05-01 DIAGNOSIS — Z09 POSTOPERATIVE EXAMINATION: Primary | ICD-10-CM

## 2023-05-01 PROCEDURE — 99024 POSTOP FOLLOW-UP VISIT: CPT | Performed by: OBSTETRICS & GYNECOLOGY

## 2023-05-01 NOTE — PROGRESS NOTES
Postoperative examination:    No complaints or problems. No postmenopausal bleeding at this time. No concerns. Pathology report discussed with patient.   She will follow-up for annual examination

## 2023-06-07 ENCOUNTER — HOSPITAL ENCOUNTER (OUTPATIENT)
Age: 73
Discharge: HOME | End: 2023-06-07
Payer: MEDICARE

## 2023-06-07 LAB
ANION GAP SERPL CALCULATED.3IONS-SCNC: 13 MMOL/L (ref 4–16)
BUN SERPL-MCNC: 13 MG/DL (ref 6–23)
CALCIUM SERPL-MCNC: 9.6 MG/DL (ref 8.3–10.6)
CHLORIDE BLD-SCNC: 100 MMOL/L (ref 99–110)
CO2: 25 MMOL/L (ref 21–32)
CREAT SERPL-MCNC: 0.7 MG/DL (ref 0.6–1.1)
CREAT UR-MCNC: 89 MG/DL (ref 28–217)
GFR SERPL CREATININE-BSD FRML MDRD: >60 ML/MIN/1.73M2
GLUCOSE SERPL-MCNC: 127 MG/DL (ref 70–99)
MAGNESIUM: 2.4 MG/DL (ref 1.8–2.4)
MICROALBUMIN 24H UR-MCNC: 3.5 MG/DL
MICROALBUMIN/CREAT UR-RTO: 39.3 MG/G CREAT (ref 0–30)
POTASSIUM SERPL-SCNC: 5.3 MMOL/L (ref 3.5–5.1)
SODIUM BLD-SCNC: 138 MMOL/L (ref 135–145)

## 2023-06-07 PROCEDURE — 36415 COLL VENOUS BLD VENIPUNCTURE: CPT

## 2023-06-07 PROCEDURE — 82570 ASSAY OF URINE CREATININE: CPT

## 2023-06-07 PROCEDURE — 83735 ASSAY OF MAGNESIUM: CPT

## 2023-06-07 PROCEDURE — 80048 BASIC METABOLIC PNL TOTAL CA: CPT

## 2023-06-07 PROCEDURE — 82043 UR ALBUMIN QUANTITATIVE: CPT

## 2023-06-19 ENCOUNTER — TELEPHONE (OUTPATIENT)
Dept: INTERNAL MEDICINE CLINIC | Age: 73
End: 2023-06-19

## 2023-06-19 NOTE — TELEPHONE ENCOUNTER
Patient called and said she missed a call from the office but there was no voicemail. Checked with MA, unsure of who called the patient and why.

## 2023-06-19 NOTE — TELEPHONE ENCOUNTER
Did not see why anyone would have called the patient. She had no new labs or testing and she has no up coming appointment soon.

## 2023-07-16 DIAGNOSIS — F32.A DEPRESSION, UNSPECIFIED DEPRESSION TYPE: ICD-10-CM

## 2023-07-17 RX ORDER — ESCITALOPRAM OXALATE 10 MG/1
10 TABLET ORAL DAILY
Qty: 90 TABLET | Refills: 1 | Status: SHIPPED | OUTPATIENT
Start: 2023-07-17

## 2023-08-26 ENCOUNTER — HOSPITAL ENCOUNTER (OUTPATIENT)
Age: 73
Discharge: HOME | End: 2023-08-26
Payer: MEDICARE

## 2023-08-26 DIAGNOSIS — R25.1 TREMOR OF BOTH HANDS: ICD-10-CM

## 2023-08-26 DIAGNOSIS — E87.5 HYPERKALEMIA: ICD-10-CM

## 2023-08-26 LAB
POTASSIUM SERPL-SCNC: 4.8 MMOL/L (ref 3.5–5.1)
TSH SERPL DL<=0.005 MIU/L-ACNC: 2.13 UIU/ML (ref 0.27–4.2)

## 2023-08-26 PROCEDURE — 84132 ASSAY OF SERUM POTASSIUM: CPT

## 2023-08-26 PROCEDURE — 36415 COLL VENOUS BLD VENIPUNCTURE: CPT

## 2023-08-26 PROCEDURE — 84443 ASSAY THYROID STIM HORMONE: CPT

## 2023-09-26 DIAGNOSIS — E78.5 HYPERLIPIDEMIA, UNSPECIFIED HYPERLIPIDEMIA TYPE: ICD-10-CM

## 2023-09-26 RX ORDER — SIMVASTATIN 20 MG
20 TABLET ORAL EVERY EVENING
Qty: 90 TABLET | Refills: 0 | Status: SHIPPED | OUTPATIENT
Start: 2023-09-26

## 2023-10-23 ENCOUNTER — TELEMEDICINE (OUTPATIENT)
Dept: INTERNAL MEDICINE CLINIC | Age: 73
End: 2023-10-23
Payer: COMMERCIAL

## 2023-10-23 DIAGNOSIS — Z00.00 MEDICARE ANNUAL WELLNESS VISIT, SUBSEQUENT: Primary | ICD-10-CM

## 2023-10-23 PROCEDURE — 1123F ACP DISCUSS/DSCN MKR DOCD: CPT | Performed by: FAMILY MEDICINE

## 2023-10-23 PROCEDURE — G0439 PPPS, SUBSEQ VISIT: HCPCS | Performed by: FAMILY MEDICINE

## 2023-10-23 ASSESSMENT — PATIENT HEALTH QUESTIONNAIRE - PHQ9
4. FEELING TIRED OR HAVING LITTLE ENERGY: 0
SUM OF ALL RESPONSES TO PHQ QUESTIONS 1-9: 0
5. POOR APPETITE OR OVEREATING: 0
10. IF YOU CHECKED OFF ANY PROBLEMS, HOW DIFFICULT HAVE THESE PROBLEMS MADE IT FOR YOU TO DO YOUR WORK, TAKE CARE OF THINGS AT HOME, OR GET ALONG WITH OTHER PEOPLE: 2
2. FEELING DOWN, DEPRESSED OR HOPELESS: 0
8. MOVING OR SPEAKING SO SLOWLY THAT OTHER PEOPLE COULD HAVE NOTICED. OR THE OPPOSITE, BEING SO FIGETY OR RESTLESS THAT YOU HAVE BEEN MOVING AROUND A LOT MORE THAN USUAL: 0
9. THOUGHTS THAT YOU WOULD BE BETTER OFF DEAD, OR OF HURTING YOURSELF: 0
SUM OF ALL RESPONSES TO PHQ QUESTIONS 1-9: 0
SUM OF ALL RESPONSES TO PHQ9 QUESTIONS 1 & 2: 0
3. TROUBLE FALLING OR STAYING ASLEEP: 0
7. TROUBLE CONCENTRATING ON THINGS, SUCH AS READING THE NEWSPAPER OR WATCHING TELEVISION: 0
6. FEELING BAD ABOUT YOURSELF - OR THAT YOU ARE A FAILURE OR HAVE LET YOURSELF OR YOUR FAMILY DOWN: 0
SUM OF ALL RESPONSES TO PHQ QUESTIONS 1-9: 0
1. LITTLE INTEREST OR PLEASURE IN DOING THINGS: 0
SUM OF ALL RESPONSES TO PHQ QUESTIONS 1-9: 0

## 2023-10-23 ASSESSMENT — COLUMBIA-SUICIDE SEVERITY RATING SCALE - C-SSRS
3. HAVE YOU BEEN THINKING ABOUT HOW YOU MIGHT KILL YOURSELF?: NO
5. HAVE YOU STARTED TO WORK OUT OR WORKED OUT THE DETAILS OF HOW TO KILL YOURSELF? DO YOU INTEND TO CARRY OUT THIS PLAN?: NO
4. HAVE YOU HAD THESE THOUGHTS AND HAD SOME INTENTION OF ACTING ON THEM?: NO
7. DID THIS OCCUR IN THE LAST THREE MONTHS: NO

## 2023-10-23 NOTE — PATIENT INSTRUCTIONS
Personalized Preventive Plan for Marcy Power - 10/23/2023  Medicare offers a range of preventive health benefits. Some of the tests and screenings are paid in full while other may be subject to a deductible, co-insurance, and/or copay. Some of these benefits include a comprehensive review of your medical history including lifestyle, illnesses that may run in your family, and various assessments and screenings as appropriate. After reviewing your medical record and screening and assessments performed today your provider may have ordered immunizations, labs, imaging, and/or referrals for you. A list of these orders (if applicable) as well as your Preventive Care list are included within your After Visit Summary for your review. Other Preventive Recommendations:    A preventive eye exam performed by an eye specialist is recommended every 1-2 years to screen for glaucoma; cataracts, macular degeneration, and other eye disorders. A preventive dental visit is recommended every 6 months. Try to get at least 150 minutes of exercise per week or 10,000 steps per day on a pedometer . Order or download the FREE \"Exercise & Physical Activity: Your Everyday Guide\" from [employer]. Call 4-907.729.8103 or search [employer] online. You need 5643-5106 mg of calcium and 9691-8925 IU of vitamin D per day. It is possible to meet your calcium requirement with diet alone, but a vitamin D supplement is usually necessary to meet this goal.  When exposed to the sun, use a sunscreen that protects against both UVA and UVB radiation with an SPF of 30 or greater. Reapply every 2 to 3 hours or after sweating, drying off with a towel, or swimming. Always wear a seat belt when traveling in a car. Always wear a helmet when riding a bicycle or motorcycle.

## 2023-10-23 NOTE — PROGRESS NOTES
caregiver was present when appropriate.    The patient was located at Home: [address] [facility]  Provider was located at [facility] (Appt Dept): [address]

## 2023-11-14 ENCOUNTER — OFFICE VISIT (OUTPATIENT)
Dept: INTERNAL MEDICINE CLINIC | Age: 73
End: 2023-11-14
Payer: COMMERCIAL

## 2023-11-14 VITALS
OXYGEN SATURATION: 97 % | SYSTOLIC BLOOD PRESSURE: 120 MMHG | DIASTOLIC BLOOD PRESSURE: 82 MMHG | HEIGHT: 60 IN | BODY MASS INDEX: 45.86 KG/M2 | WEIGHT: 233.6 LBS | HEART RATE: 65 BPM

## 2023-11-14 DIAGNOSIS — R39.9 UTI SYMPTOMS: Primary | ICD-10-CM

## 2023-11-14 DIAGNOSIS — F32.A DEPRESSION, UNSPECIFIED DEPRESSION TYPE: ICD-10-CM

## 2023-11-14 DIAGNOSIS — J43.2 CENTRILOBULAR EMPHYSEMA (HCC): ICD-10-CM

## 2023-11-14 DIAGNOSIS — Z79.4 TYPE 2 DIABETES MELLITUS WITHOUT COMPLICATION, WITH LONG-TERM CURRENT USE OF INSULIN (HCC): ICD-10-CM

## 2023-11-14 DIAGNOSIS — E11.9 TYPE 2 DIABETES MELLITUS WITHOUT COMPLICATION, WITH LONG-TERM CURRENT USE OF INSULIN (HCC): ICD-10-CM

## 2023-11-14 DIAGNOSIS — R53.83 FATIGUE, UNSPECIFIED TYPE: ICD-10-CM

## 2023-11-14 DIAGNOSIS — E78.5 HYPERLIPIDEMIA, UNSPECIFIED HYPERLIPIDEMIA TYPE: ICD-10-CM

## 2023-11-14 LAB
BILIRUBIN, POC: NEGATIVE
BLOOD URINE, POC: NEGATIVE
CLARITY, POC: NORMAL
COLOR, POC: NORMAL
GLUCOSE URINE, POC: NORMAL
KETONES, POC: NEGATIVE
LEUKOCYTE EST, POC: NEGATIVE
NITRITE, POC: NEGATIVE
PH, POC: 6
PROTEIN, POC: NEGATIVE
SPECIFIC GRAVITY, POC: 1.01
UROBILINOGEN, POC: NORMAL

## 2023-11-14 PROCEDURE — 81002 URINALYSIS NONAUTO W/O SCOPE: CPT | Performed by: FAMILY MEDICINE

## 2023-11-14 PROCEDURE — 3078F DIAST BP <80 MM HG: CPT | Performed by: FAMILY MEDICINE

## 2023-11-14 PROCEDURE — 3044F HG A1C LEVEL LT 7.0%: CPT | Performed by: FAMILY MEDICINE

## 2023-11-14 PROCEDURE — 1123F ACP DISCUSS/DSCN MKR DOCD: CPT | Performed by: FAMILY MEDICINE

## 2023-11-14 PROCEDURE — 99214 OFFICE O/P EST MOD 30 MIN: CPT | Performed by: FAMILY MEDICINE

## 2023-11-14 PROCEDURE — 3074F SYST BP LT 130 MM HG: CPT | Performed by: FAMILY MEDICINE

## 2023-11-14 RX ORDER — SIMVASTATIN 20 MG
20 TABLET ORAL EVERY EVENING
Qty: 90 TABLET | Refills: 1 | Status: SHIPPED | OUTPATIENT
Start: 2023-11-14

## 2023-11-14 RX ORDER — ESCITALOPRAM OXALATE 10 MG/1
10 TABLET ORAL DAILY
Qty: 90 TABLET | Refills: 1 | Status: SHIPPED | OUTPATIENT
Start: 2023-11-14

## 2023-11-14 ASSESSMENT — ENCOUNTER SYMPTOMS
NAUSEA: 0
SHORTNESS OF BREATH: 1
COUGH: 0
ABDOMINAL PAIN: 0

## 2023-11-14 NOTE — PROGRESS NOTES
for frequency. Neurological:  Negative for dizziness and headaches. Allergies   Allergen Reactions    Norvasc [Amlodipine Besylate]     Ace Inhibitors      Cough    Codeine      Pt states she is uncertain of reaction     Soybean-Containing Drug Products      Current Outpatient Medications   Medication Sig Dispense Refill    escitalopram (LEXAPRO) 10 MG tablet Take 1 tablet by mouth daily 90 tablet 1    simvastatin (ZOCOR) 20 MG tablet Take 1 tablet by mouth every evening 90 tablet 1    albuterol sulfate HFA (VENTOLIN HFA) 108 (90 Base) MCG/ACT inhaler Inhale 2 puffs into the lungs every 6 hours as needed for Wheezing 54 g 3    spironolactone (ALDACTONE) 25 MG tablet Take 1 tablet by mouth daily      ibuprofen (IBU) 800 MG tablet Take 1 tablet by mouth every 8 hours as needed for Pain Take with food. 30 tablet 1    losartan (COZAAR) 100 MG tablet TAKE 1 TABLET BY MOUTH EVERY DAY      Empagliflozin (JARDIANCE PO) Take 10 mg by mouth daily      metoprolol succinate (TOPROL XL) 50 MG extended release tablet And 25 at night      NONFORMULARY Sleep aide(Equate)      Calcium Carb-Cholecalciferol 600-800 MG-UNIT CHEW DAILY       No current facility-administered medications for this visit. Vitals:    11/14/23 1446   BP: 120/82   Site: Left Upper Arm   Position: Sitting   Cuff Size: Large Adult   Pulse: 65   SpO2: 97%   Weight: 106 kg (233 lb 9.6 oz)   Height: 1.524 m (5')     Objective   Physical Exam  Vitals reviewed. Constitutional:       General: She is not in acute distress. Eyes:      Extraocular Movements: Extraocular movements intact. Cardiovascular:      Rate and Rhythm: Normal rate and regular rhythm. Pulmonary:      Effort: Pulmonary effort is normal. No respiratory distress. Breath sounds: Normal breath sounds. Abdominal:      Palpations: Abdomen is soft. Tenderness: There is no abdominal tenderness. Musculoskeletal:      Cervical back: Neck supple.       Right lower leg: Edema

## 2023-11-16 LAB — BACTERIA UR CULT: NORMAL

## 2023-11-20 ENCOUNTER — TELEPHONE (OUTPATIENT)
Dept: CARDIOLOGY CLINIC | Age: 73
End: 2023-11-20

## 2023-11-20 NOTE — TELEPHONE ENCOUNTER
Spoke with patient and she would like to be evaluated for pain at [facility] site. Not sure if device is laying on a nerve but very uncomfortable. She states PPM has moved some and believes it's on a nerve, advised her we will bring her in to the office to evaluate situation. She voice understanding and denies any other symptoms.

## 2023-11-20 NOTE — TELEPHONE ENCOUNTER
Patient's HHN called stating that patient has a pacer implant back in 12/2022; the pace maker has traveled in her chest and she now believes it be resting on a nerve. It is causing her pain. Please call patient back at ph# [phone].

## 2023-11-21 ENCOUNTER — OFFICE VISIT (OUTPATIENT)
Dept: CARDIOLOGY CLINIC | Age: 73
End: 2023-11-21

## 2023-11-21 VITALS
DIASTOLIC BLOOD PRESSURE: 60 MMHG | SYSTOLIC BLOOD PRESSURE: 108 MMHG | BODY MASS INDEX: 45.75 KG/M2 | OXYGEN SATURATION: 95 % | HEIGHT: 60 IN | WEIGHT: 233 LBS | HEART RATE: 66 BPM

## 2023-11-21 DIAGNOSIS — R52 PAIN: ICD-10-CM

## 2023-11-21 DIAGNOSIS — Z95.810 BIVENTRICULAR ICD (IMPLANTABLE CARDIOVERTER-DEFIBRILLATOR) IN PLACE: Primary | ICD-10-CM

## 2023-11-21 DIAGNOSIS — I10 ESSENTIAL HYPERTENSION: ICD-10-CM

## 2023-11-21 ASSESSMENT — ENCOUNTER SYMPTOMS
PHOTOPHOBIA: 0
ABDOMINAL PAIN: 0
SINUS PRESSURE: 0
COUGH: 0
BACK PAIN: 0
SINUS PAIN: 0
ABDOMINAL DISTENTION: 0
COLOR CHANGE: 0
SHORTNESS OF BREATH: 0

## 2023-11-21 NOTE — PROGRESS NOTES
Electrophysiology Follow Note      Reason for consultation:  Need for ICD    Chief complaint: pain at pacemaker site    Referring physician:       Primary care physician: Mili Mendoza DO      History of Present Illness: This visit 11/21/2023  Is here today with complaints of pain at her pacemaker site. Patient reports that her home health nurse was concerned because patient has been complaining of pain at the pacemaker site. Patient reports that she has had intermittent pain for the last few months. She also reports that she woke up this morning with pain in her left shoulder. She additionally reports that when she picks up objects her left hand will shake. She is concerned that this is caused by the ICD. She denies chest pain, palpitations, shortness of breath, lightheadedness, dizziness, edema or syncope. Patient is concerned also that her pacemaker has moved in her chest.    Previous visit 1/26/2023  Patient is here today with complaints of low heart rate. Patient states she has a home health nurse had has not noticed her heart rate in the 40s. Patient states that she is feeling well. She denies chest pain, palpitations, shortness of breath, lightheadedness, dizziness, edema or syncope    Previous visit  Patient is a 77-year-old female with a history of hypertension, hyperlipidemia, nonischemic cardiomyopathy, obstructive sleep apnea, diabetes mellitus type 2 referred by Dr. Jean Araiza office for ICD placement. Patient reports shortness of breath with minimal exertion. Patient denies any chest pain, patient denies any palpitations, patient denies dizziness or syncope, patient does not smoke or drink alcohol. Patient denies caffeine use. Patient does not exercise much.   Patient has tiredness multaq the      Pastmedical history:   Past Medical History:   Diagnosis Date    Anemia     Arthritis     Right Hip    Asthma     Cardiomyopathy, nonischemic

## 2023-11-21 NOTE — PATIENT INSTRUCTIONS
Please be informed that if you contact our office outside of normal business hours the physician on call cannot help with any scheduling or rescheduling issues, procedure instruction questions or any type of medication issue. We advise you for any urgent/emergency that you go to the nearest emergency room! PLEASE CALL OUR OFFICE DURING NORMAL BUSINESS HOURS    Monday - Friday   8 am to 5 pm    [place]: [address] [place]: [phone]    [place]:  [phone]    **It is YOUR responsibilty to bring medication bottles and/or updated medication list to [facility]. This will allow us to better serve you and all your healthcare needs**    Thank you for allowing us to care for you today! We want to ensure we can follow your treatment plan and we strive to give you the best outcomes and experience possible. If you ever have a life threatening emergency and call 911 - for an ambulance (EMS)   Our providers can only care for you at:   [facility] or [facility]. Even if you have someone take you or you drive yourself we can only care for you in a [facility] facility. Our providers are not setup at the other healthcare locations! [facility] Locations - No appointment necessary. Sites open Monday to Friday. Call your preferred location for test preparation, business   hours and other information you need. [employer]. [address] [place] [address]. 27 . [facility]. [place], [facility]  Phone: [phone]       We are committed to providing you the best care possible. If you receive a survey after visiting one of our offices, please take time to share your experience concerning your physician office visit. These surveys are confidential and no health information about you is shared. We are eager to improve for you and we are counting on your feedback to help make that happen.

## 2024-01-26 ENCOUNTER — OFFICE VISIT (OUTPATIENT)
Dept: FAMILY MEDICINE CLINIC | Age: 74
End: 2024-01-26

## 2024-01-26 VITALS
WEIGHT: 238 LBS | TEMPERATURE: 96.8 F | BODY MASS INDEX: 46.72 KG/M2 | OXYGEN SATURATION: 96 % | DIASTOLIC BLOOD PRESSURE: 74 MMHG | RESPIRATION RATE: 20 BRPM | SYSTOLIC BLOOD PRESSURE: 112 MMHG | HEART RATE: 81 BPM | HEIGHT: 60 IN

## 2024-01-26 DIAGNOSIS — J40 BRONCHITIS: Primary | ICD-10-CM

## 2024-01-26 DIAGNOSIS — J10.1 INFLUENZA A: ICD-10-CM

## 2024-01-26 DIAGNOSIS — R05.1 ACUTE COUGH: ICD-10-CM

## 2024-01-26 DIAGNOSIS — J02.9 SORE THROAT: ICD-10-CM

## 2024-01-26 PROBLEM — I50.20 SYSTOLIC CONGESTIVE HEART FAILURE, UNSPECIFIED HF CHRONICITY (HCC): Status: ACTIVE | Noted: 2024-01-26

## 2024-01-26 LAB
INFLUENZA A ANTIGEN, POC: POSITIVE
INFLUENZA B ANTIGEN, POC: NEGATIVE
LOT EXPIRE DATE: ABNORMAL
LOT KIT NUMBER: ABNORMAL
SARS-COV-2, POC: ABNORMAL
VALID INTERNAL CONTROL: ABNORMAL
VENDOR AND KIT NAME POC: ABNORMAL

## 2024-01-26 RX ORDER — AZITHROMYCIN 250 MG/1
250 TABLET, FILM COATED ORAL SEE ADMIN INSTRUCTIONS
Qty: 6 TABLET | Refills: 0 | Status: SHIPPED | OUTPATIENT
Start: 2024-01-26 | End: 2024-01-31

## 2024-01-26 RX ORDER — PREDNISONE 20 MG/1
20 TABLET ORAL DAILY
Qty: 5 TABLET | Refills: 0 | Status: SHIPPED | OUTPATIENT
Start: 2024-01-26 | End: 2024-01-31

## 2024-01-26 ASSESSMENT — ENCOUNTER SYMPTOMS
ABDOMINAL PAIN: 0
SORE THROAT: 1
SINUS PAIN: 1
CHEST TIGHTNESS: 0
SHORTNESS OF BREATH: 1
WHEEZING: 1
COUGH: 1
RHINORRHEA: 1
DIARRHEA: 0
SWOLLEN GLANDS: 0
NAUSEA: 0
VOMITING: 0

## 2024-01-26 NOTE — PATIENT INSTRUCTIONS
Take all medicines exactly as prescribed. Call your doctor if you think you are having a problem with your medicine.  Get some extra rest.  Take an over-the-counter pain medicine, such as acetaminophen (Tylenol), ibuprofen (Advil, Motrin), or naproxen (Aleve) to reduce fever and relieve body aches. Read and follow all instructions on the label.  Do not take two or more pain medicines at the same time unless the doctor told you to. Many pain medicines have acetaminophen, which is Tylenol. Too much acetaminophen (Tylenol) can be harmful.  Take an over-the-counter cough medicine to help quiet a dry, hacking cough so that you can sleep. Avoid cough medicines that have more than one active ingredient. Read and follow all instructions on the label.  Do not smoke. Smoking can make bronchitis worse. If you need help quitting, talk to your doctor about stop-smoking programs and medicines. These can increase your chances of quitting for good.

## 2024-01-26 NOTE — PROGRESS NOTES
Chest:      Chest wall: No tenderness.   Musculoskeletal:      Cervical back: Neck supple.   Lymphadenopathy:      Cervical: No cervical adenopathy.   Skin:     General: Skin is warm and dry.      Capillary Refill: Capillary refill takes less than 2 seconds.   Neurological:      General: No focal deficit present.      Mental Status: She is alert and oriented to person, place, and time.       Results for orders placed or performed in visit on 24   POCT COVID-19 & Influenza A/B   Result Value Ref Range    VALID INTERNAL CONTROL Pass     Lot/Kit Number [id number]     Lot/Kit  date:      SARS-COV-2, POC Not-Detected Not Detected    Influenza A Antigen, POC Positive     Influenza B Antigen, POC Negative     Vendor and kit name [employer]       ASSESSMENT/PLAN:  1. Acute cough  Drink lots of water and other fluids. This helps thin the mucus and soothes a dry or sore throat. Honey or lemon juice in hot water or tea may ease a dry cough.  Take cough medicine as directed by your doctor.  Prop up your head on pillows to help you breathe and ease a dry cough.  Try cough drops or hard candy to soothe a dry or sore throat.  Do not smoke. Avoid secondhand smoke.   - POCT COVID-19 & Influenza A/B    2. Bronchitis    - azithromycin (ZITHROMAX) 250 MG tablet; Take 1 tablet by mouth See Admin Instructions for 5 days 500mg on day 1 followed by 250mg on days 2 - 5  Dispense: 6 tablet; Refill: 0  - predniSONE (DELTASONE) 20 MG tablet; Take 1 tablet by mouth daily for 5 days  Dispense: 5 tablet; Refill: 0    3. Influenza A  Increase fluids and rest  Saline nasal spray as needed for nasal congestion  Warm salt gargles as needed for throat discomfort  Monitor temperature  Tylenol as needed for fevers and/or discomfort.   Big deep breaths periodically throughout the day  Regular Mucinex over the counter as needed for chest congestion  If symptoms worsen -Go to the ER.   Follow up with your primary care provider     4. Sore

## 2024-02-07 ENCOUNTER — OFFICE VISIT (OUTPATIENT)
Dept: FAMILY MEDICINE CLINIC | Age: 74
End: 2024-02-07
Payer: MEDICARE

## 2024-02-07 VITALS
SYSTOLIC BLOOD PRESSURE: 148 MMHG | WEIGHT: 243 LBS | HEIGHT: 60 IN | OXYGEN SATURATION: 96 % | HEART RATE: 96 BPM | BODY MASS INDEX: 47.71 KG/M2 | RESPIRATION RATE: 20 BRPM | DIASTOLIC BLOOD PRESSURE: 98 MMHG | TEMPERATURE: 97 F

## 2024-02-07 DIAGNOSIS — B96.89 ACUTE BACTERIAL SINUSITIS: Primary | ICD-10-CM

## 2024-02-07 DIAGNOSIS — J01.90 ACUTE BACTERIAL SINUSITIS: Primary | ICD-10-CM

## 2024-02-07 PROCEDURE — 1123F ACP DISCUSS/DSCN MKR DOCD: CPT | Performed by: NURSE PRACTITIONER

## 2024-02-07 PROCEDURE — 3077F SYST BP >= 140 MM HG: CPT | Performed by: NURSE PRACTITIONER

## 2024-02-07 PROCEDURE — 3080F DIAST BP >= 90 MM HG: CPT | Performed by: NURSE PRACTITIONER

## 2024-02-07 PROCEDURE — 99213 OFFICE O/P EST LOW 20 MIN: CPT | Performed by: NURSE PRACTITIONER

## 2024-02-07 RX ORDER — FLUTICASONE PROPIONATE 50 MCG
1 SPRAY, SUSPENSION (ML) NASAL DAILY
Qty: 16 G | Refills: 0 | Status: SHIPPED | OUTPATIENT
Start: 2024-02-07

## 2024-02-07 RX ORDER — AMOXICILLIN AND CLAVULANATE POTASSIUM 875; 125 MG/1; MG/1
1 TABLET, FILM COATED ORAL 2 TIMES DAILY
Qty: 14 TABLET | Refills: 0 | Status: SHIPPED | OUTPATIENT
Start: 2024-02-07 | End: 2024-02-14

## 2024-02-07 ASSESSMENT — ENCOUNTER SYMPTOMS
SHORTNESS OF BREATH: 0
SINUS PAIN: 1
ABDOMINAL PAIN: 0
DIARRHEA: 0
COUGH: 1
WHEEZING: 0
SINUS PRESSURE: 1
VOMITING: 0
NAUSEA: 0
SWOLLEN GLANDS: 0
SORE THROAT: 1
CHEST TIGHTNESS: 0
RHINORRHEA: 1

## 2024-02-07 NOTE — PROGRESS NOTES
Cielo Howard   73 y.o.  female  [id number]      Chief Complaint   Patient presents with    Influenza     Diagnosed with flu 01/26/2024. Pt not feeling better. Completed ATB and steroid.         Subjective:  73 y.o.female is here for a follow up. She has the following chronic/acute medical problems:  Patient Active Problem List   Diagnosis    Centrilobular emphysema (HCC)    Mild intermittent asthma without complication    YOLANDA on CPAP    Type 2 diabetes mellitus without complication, with long-term current use of insulin (HCC)    Depression    Hyperlipidemia    Non-ischemic cardiomyopathy (HCC)    Essential hypertension    PMB (postmenopausal bleeding)    Vaginal discharge    Biventricular ICD (implantable cardioverter-defibrillator) in place    PVC (premature ventricular contraction)    Systolic congestive heart failure, unspecified HF chronicity (HCC)       URI   This is a new problem. The current episode started 1 to 4 weeks ago (states 2 weeks ago). The problem has been unchanged. There has been no fever. Associated symptoms include congestion (nasal congestion), coughing, rhinorrhea, sinus pain and a sore throat (occassionally). Pertinent negatives include no abdominal pain, chest pain, diarrhea, dysuria, ear pain, headaches, joint pain, joint swelling, nausea, neck pain, plugged ear sensation, rash, sneezing, swollen glands, vomiting or wheezing. Treatments tried: 1/26/2024 - was seen - diagnosed influenza A - given a zpack and prednisone for bronchitis per patient. The treatment provided no relief.       Review of Systems   Constitutional:  Positive for fatigue. Negative for appetite change, chills and fever.   HENT:  Positive for congestion (nasal congestion), postnasal drip (states a little bit), rhinorrhea, sinus pressure, sinus pain and sore throat (occassionally). Negative for ear pain and sneezing.    Respiratory:  Positive for cough. Negative for chest tightness, shortness of breath and wheezing.

## 2024-03-14 ENCOUNTER — HOSPITAL ENCOUNTER (OUTPATIENT)
Dept: GENERAL RADIOLOGY | Age: 74
Discharge: HOME | End: 2024-03-14
Payer: MEDICARE

## 2024-03-14 ENCOUNTER — HOSPITAL ENCOUNTER (OUTPATIENT)
Age: 74
Discharge: HOME | End: 2024-03-14
Payer: MEDICARE

## 2024-03-14 DIAGNOSIS — R52 PAIN: ICD-10-CM

## 2024-03-14 PROCEDURE — 71046 X-RAY EXAM CHEST 2 VIEWS: CPT

## 2024-03-28 ENCOUNTER — HOSPITAL ENCOUNTER (OUTPATIENT)
Age: 74
Discharge: HOME | End: 2024-03-28
Payer: MEDICARE

## 2024-03-28 DIAGNOSIS — Z79.4 TYPE 2 DIABETES MELLITUS WITHOUT COMPLICATION, WITH LONG-TERM CURRENT USE OF INSULIN (HCC): ICD-10-CM

## 2024-03-28 DIAGNOSIS — R53.83 FATIGUE, UNSPECIFIED TYPE: ICD-10-CM

## 2024-03-28 DIAGNOSIS — E11.9 TYPE 2 DIABETES MELLITUS WITHOUT COMPLICATION, WITH LONG-TERM CURRENT USE OF INSULIN (HCC): ICD-10-CM

## 2024-03-28 LAB
ALBUMIN SERPL-MCNC: 4.5 GM/DL (ref 3.4–5)
ALP BLD-CCNC: 122 IU/L (ref 40–128)
ALT SERPL-CCNC: 16 U/L (ref 10–40)
ANION GAP SERPL CALCULATED.3IONS-SCNC: 16 MMOL/L (ref 7–16)
AST SERPL-CCNC: 14 IU/L (ref 15–37)
BASOPHILS ABSOLUTE: 0 K/CU MM
BASOPHILS RELATIVE PERCENT: 0.3 % (ref 0–1)
BILIRUB SERPL-MCNC: 0.3 MG/DL (ref 0–1)
BUN SERPL-MCNC: 15 MG/DL (ref 6–23)
CALCIUM SERPL-MCNC: 10.5 MG/DL (ref 8.3–10.6)
CHLORIDE BLD-SCNC: 101 MMOL/L (ref 99–110)
CO2: 22 MMOL/L (ref 21–32)
CREAT SERPL-MCNC: 0.7 MG/DL (ref 0.6–1.1)
DIFFERENTIAL TYPE: ABNORMAL
EOSINOPHILS ABSOLUTE: 0.3 K/CU MM
EOSINOPHILS RELATIVE PERCENT: 4 % (ref 0–3)
ESTIMATED AVERAGE GLUCOSE: 140 MG/DL
GFR SERPL CREATININE-BSD FRML MDRD: 90 ML/MIN/1.73M2
GLUCOSE SERPL-MCNC: 130 MG/DL (ref 70–99)
HBA1C MFR BLD: 6.5 % (ref 4.2–6.3)
HCT VFR BLD CALC: 42 % (ref 37–47)
HEMOGLOBIN: 12.8 GM/DL (ref 12.5–16)
IMMATURE NEUTROPHIL %: 0.4 % (ref 0–0.43)
LYMPHOCYTES ABSOLUTE: 1.7 K/CU MM
LYMPHOCYTES RELATIVE PERCENT: 24.7 % (ref 24–44)
MCH RBC QN AUTO: 28.8 PG (ref 27–31)
MCHC RBC AUTO-ENTMCNC: 30.5 % (ref 32–36)
MCV RBC AUTO: 94.6 FL (ref 78–100)
MONOCYTES ABSOLUTE: 0.5 K/CU MM
MONOCYTES RELATIVE PERCENT: 6.6 % (ref 0–4)
NUCLEATED RBC %: 0 %
PDW BLD-RTO: 12.6 % (ref 11.7–14.9)
PLATELET # BLD: 204 K/CU MM (ref 140–440)
PMV BLD AUTO: 9.1 FL (ref 7.5–11.1)
POTASSIUM SERPL-SCNC: 4.8 MMOL/L (ref 3.5–5.1)
RBC # BLD: 4.44 M/CU MM (ref 4.2–5.4)
SEGMENTED NEUTROPHILS ABSOLUTE COUNT: 4.5 K/CU MM
SEGMENTED NEUTROPHILS RELATIVE PERCENT: 64 % (ref 36–66)
SODIUM BLD-SCNC: 139 MMOL/L (ref 135–145)
TOTAL IMMATURE NEUTOROPHIL: 0.03 K/CU MM
TOTAL NUCLEATED RBC: 0 K/CU MM
TOTAL PROTEIN: 7.1 GM/DL (ref 6.4–8.2)
WBC # BLD: 7 K/CU MM (ref 4–10.5)

## 2024-03-28 PROCEDURE — 85025 COMPLETE CBC W/AUTO DIFF WBC: CPT

## 2024-03-28 PROCEDURE — 80053 COMPREHEN METABOLIC PANEL: CPT

## 2024-03-28 PROCEDURE — 83036 HEMOGLOBIN GLYCOSYLATED A1C: CPT

## 2024-03-28 PROCEDURE — 36415 COLL VENOUS BLD VENIPUNCTURE: CPT

## 2024-04-04 DIAGNOSIS — B96.89 ACUTE BACTERIAL SINUSITIS: ICD-10-CM

## 2024-04-04 DIAGNOSIS — J01.90 ACUTE BACTERIAL SINUSITIS: ICD-10-CM

## 2024-04-04 RX ORDER — FLUTICASONE PROPIONATE 50 MCG
SPRAY, SUSPENSION (ML) NASAL
Qty: 16 G | Refills: 0 | Status: SHIPPED | OUTPATIENT
Start: 2024-04-04

## 2024-04-15 ASSESSMENT — PATIENT HEALTH QUESTIONNAIRE - PHQ9
SUM OF ALL RESPONSES TO PHQ QUESTIONS 1-9: 0
7. TROUBLE CONCENTRATING ON THINGS, SUCH AS READING THE NEWSPAPER OR WATCHING TELEVISION: NOT AT ALL
SUM OF ALL RESPONSES TO PHQ QUESTIONS 1-9: 0
6. FEELING BAD ABOUT YOURSELF - OR THAT YOU ARE A FAILURE OR HAVE LET YOURSELF OR YOUR FAMILY DOWN: NOT AT ALL
1. LITTLE INTEREST OR PLEASURE IN DOING THINGS: NOT AT ALL
4. FEELING TIRED OR HAVING LITTLE ENERGY: NOT AT ALL
10. IF YOU CHECKED OFF ANY PROBLEMS, HOW DIFFICULT HAVE THESE PROBLEMS MADE IT FOR YOU TO DO YOUR WORK, TAKE CARE OF THINGS AT HOME, OR GET ALONG WITH OTHER PEOPLE: NOT DIFFICULT AT ALL
2. FEELING DOWN, DEPRESSED OR HOPELESS: NOT AT ALL
3. TROUBLE FALLING OR STAYING ASLEEP: NOT AT ALL
6. FEELING BAD ABOUT YOURSELF - OR THAT YOU ARE A FAILURE OR HAVE LET YOURSELF OR YOUR FAMILY DOWN: NOT AT ALL
4. FEELING TIRED OR HAVING LITTLE ENERGY: NOT AT ALL
1. LITTLE INTEREST OR PLEASURE IN DOING THINGS: NOT AT ALL
7. TROUBLE CONCENTRATING ON THINGS, SUCH AS READING THE NEWSPAPER OR WATCHING TELEVISION: NOT AT ALL
5. POOR APPETITE OR OVEREATING: NOT AT ALL
3. TROUBLE FALLING OR STAYING ASLEEP: NOT AT ALL
9. THOUGHTS THAT YOU WOULD BE BETTER OFF DEAD, OR OF HURTING YOURSELF: NOT AT ALL
10. IF YOU CHECKED OFF ANY PROBLEMS, HOW DIFFICULT HAVE THESE PROBLEMS MADE IT FOR YOU TO DO YOUR WORK, TAKE CARE OF THINGS AT HOME, OR GET ALONG WITH OTHER PEOPLE: NOT DIFFICULT AT ALL
SUM OF ALL RESPONSES TO PHQ9 QUESTIONS 1 & 2: 0
8. MOVING OR SPEAKING SO SLOWLY THAT OTHER PEOPLE COULD HAVE NOTICED. OR THE OPPOSITE, BEING SO FIGETY OR RESTLESS THAT YOU HAVE BEEN MOVING AROUND A LOT MORE THAN USUAL: NOT AT ALL
SUM OF ALL RESPONSES TO PHQ QUESTIONS 1-9: 0
SUM OF ALL RESPONSES TO PHQ QUESTIONS 1-9: 0
9. THOUGHTS THAT YOU WOULD BE BETTER OFF DEAD, OR OF HURTING YOURSELF: NOT AT ALL
8. MOVING OR SPEAKING SO SLOWLY THAT OTHER PEOPLE COULD HAVE NOTICED. OR THE OPPOSITE - BEING SO FIDGETY OR RESTLESS THAT YOU HAVE BEEN MOVING AROUND A LOT MORE THAN USUAL: NOT AT ALL
5. POOR APPETITE OR OVEREATING: NOT AT ALL
2. FEELING DOWN, DEPRESSED OR HOPELESS: NOT AT ALL
SUM OF ALL RESPONSES TO PHQ QUESTIONS 1-9: 0

## 2024-04-16 ENCOUNTER — OFFICE VISIT (OUTPATIENT)
Dept: INTERNAL MEDICINE CLINIC | Age: 74
End: 2024-04-16
Payer: MEDICARE

## 2024-04-16 VITALS
BODY MASS INDEX: 46.41 KG/M2 | OXYGEN SATURATION: 98 % | DIASTOLIC BLOOD PRESSURE: 84 MMHG | SYSTOLIC BLOOD PRESSURE: 126 MMHG | HEART RATE: 88 BPM | WEIGHT: 236.4 LBS | HEIGHT: 60 IN

## 2024-04-16 DIAGNOSIS — E78.5 HYPERLIPIDEMIA, UNSPECIFIED HYPERLIPIDEMIA TYPE: ICD-10-CM

## 2024-04-16 DIAGNOSIS — R47.89 WORD FINDING DIFFICULTY: ICD-10-CM

## 2024-04-16 DIAGNOSIS — Z79.4 TYPE 2 DIABETES MELLITUS WITHOUT COMPLICATION, WITH LONG-TERM CURRENT USE OF INSULIN (HCC): Primary | ICD-10-CM

## 2024-04-16 DIAGNOSIS — G89.29 CHRONIC PAIN OF RIGHT KNEE: ICD-10-CM

## 2024-04-16 DIAGNOSIS — M25.561 CHRONIC PAIN OF RIGHT KNEE: ICD-10-CM

## 2024-04-16 DIAGNOSIS — F32.A DEPRESSION, UNSPECIFIED DEPRESSION TYPE: ICD-10-CM

## 2024-04-16 DIAGNOSIS — R25.1 TREMOR OF BOTH HANDS: ICD-10-CM

## 2024-04-16 DIAGNOSIS — E11.9 TYPE 2 DIABETES MELLITUS WITHOUT COMPLICATION, WITH LONG-TERM CURRENT USE OF INSULIN (HCC): Primary | ICD-10-CM

## 2024-04-16 PROCEDURE — 3079F DIAST BP 80-89 MM HG: CPT | Performed by: FAMILY MEDICINE

## 2024-04-16 PROCEDURE — 3074F SYST BP LT 130 MM HG: CPT | Performed by: FAMILY MEDICINE

## 2024-04-16 PROCEDURE — G2211 COMPLEX E/M VISIT ADD ON: HCPCS | Performed by: FAMILY MEDICINE

## 2024-04-16 PROCEDURE — 99214 OFFICE O/P EST MOD 30 MIN: CPT | Performed by: FAMILY MEDICINE

## 2024-04-16 PROCEDURE — 3044F HG A1C LEVEL LT 7.0%: CPT | Performed by: FAMILY MEDICINE

## 2024-04-16 PROCEDURE — 1123F ACP DISCUSS/DSCN MKR DOCD: CPT | Performed by: FAMILY MEDICINE

## 2024-04-16 ASSESSMENT — ENCOUNTER SYMPTOMS
ABDOMINAL PAIN: 0
COUGH: 0
SHORTNESS OF BREATH: 0
BACK PAIN: 0
NAUSEA: 0

## 2024-04-16 NOTE — PROGRESS NOTES
Cervical back: Neck supple.      Right lower leg: Edema (trace) present.      Left lower leg: Edema (trace) present.   Neurological:      Mental Status: She is alert and oriented to person, place, and time.      Cranial Nerves: No cranial nerve deficit.      Gait: Gait abnormal (cane).   Psychiatric:         Mood and Affect: Mood normal.                An electronic signature was used to authenticate this note.    --Nicole Caraballo,      This dictation was generated by voice recognition computer software.  Although all attempts are made to edit the dictation for accuracy, there may be errors in the transcription that are not intended.

## 2024-04-26 ENCOUNTER — TELEPHONE (OUTPATIENT)
Dept: INTERNAL MEDICINE CLINIC | Age: 74
End: 2024-04-26

## 2024-04-26 NOTE — TELEPHONE ENCOUNTER
Pt was supposed to make MyChart Appt around 9/16/24 and did not. I called and left Vm to get Pt an Appt

## 2024-05-02 ENCOUNTER — HOSPITAL ENCOUNTER (OUTPATIENT)
Dept: GENERAL RADIOLOGY | Age: 74
Discharge: HOME | End: 2024-05-02
Payer: MEDICARE

## 2024-05-02 ENCOUNTER — HOSPITAL ENCOUNTER (OUTPATIENT)
Age: 74
Discharge: HOME | End: 2024-05-02
Payer: MEDICARE

## 2024-05-02 ENCOUNTER — HOSPITAL ENCOUNTER (OUTPATIENT)
Dept: WOMENS IMAGING | Age: 74
Discharge: HOME | End: 2024-05-02
Payer: MEDICARE

## 2024-05-02 DIAGNOSIS — G89.29 CHRONIC PAIN OF RIGHT KNEE: ICD-10-CM

## 2024-05-02 DIAGNOSIS — Z12.31 ENCOUNTER FOR SCREENING MAMMOGRAM FOR MALIGNANT NEOPLASM OF BREAST: ICD-10-CM

## 2024-05-02 DIAGNOSIS — M25.561 CHRONIC PAIN OF RIGHT KNEE: ICD-10-CM

## 2024-05-02 PROCEDURE — 77063 BREAST TOMOSYNTHESIS BI: CPT

## 2024-05-02 PROCEDURE — 73562 X-RAY EXAM OF KNEE 3: CPT

## 2024-05-09 DIAGNOSIS — E78.5 HYPERLIPIDEMIA, UNSPECIFIED HYPERLIPIDEMIA TYPE: ICD-10-CM

## 2024-05-09 RX ORDER — SIMVASTATIN 20 MG
20 TABLET ORAL EVERY EVENING
Qty: 90 TABLET | Refills: 1 | Status: SHIPPED | OUTPATIENT
Start: 2024-05-09

## 2024-05-16 ENCOUNTER — PATIENT MESSAGE (OUTPATIENT)
Dept: INTERNAL MEDICINE CLINIC | Age: 74
End: 2024-05-16

## 2024-05-16 DIAGNOSIS — M17.11 TRICOMPARTMENT OSTEOARTHRITIS OF RIGHT KNEE: Primary | ICD-10-CM

## 2024-05-18 LAB
CREATININE URINE: 86 MG/DL
GFR, ESTIMATED: 73.8
MICROALBUMIN/CREAT 24H UR: 8.4 MG/DL
MICROALBUMIN/CREAT UR-RTO: 9.8 MG/G

## 2024-05-20 NOTE — TELEPHONE ENCOUNTER
From: Cielo Howard  To: Dr. Nicole Caraballo  Sent: 5/16/2024 10:27 PM EDT  Subject: Several ?'s    Could you please write a order for a toilet riser. I have trouble with standing up from the toilet. I use my sink to assist with my standing.  I could use another recliner/lift chair, my current recliner is broken, warranty is way over. using a coffee table to keep my legs elevated.   And yes, a referral for orthopedics person for my knee would also be appreciated.  Thank you very much in advance.

## 2024-05-22 NOTE — TELEPHONE ENCOUNTER
Called patient, attempted to fax twice rang busy both times. Patient stated she will  order on Friday.

## 2024-06-05 ENCOUNTER — TELEPHONE (OUTPATIENT)
Dept: INTERNAL MEDICINE CLINIC | Age: 74
End: 2024-06-05

## 2024-06-18 ENCOUNTER — TELEPHONE (OUTPATIENT)
Dept: PHARMACY | Facility: CLINIC | Age: 74
End: 2024-06-18

## 2024-06-18 NOTE — TELEPHONE ENCOUNTER
[facility] CLINICAL PHARMACY: ADHERENCE REVIEW  Identified care gap per Annabella: fills at [facility]: Statin adherence    Patient also appears to be prescribed: ACE/ARB(adherent)    ASSESSMENT  STATIN ADHERENCE    Insurance Records claims through 6/10/24 (Prior Year PDC = not reported; YTD PDC = 73%; Potential Fail Date: 24):   Simvastatin 20mg Next refill due: 24    Prescribed si tablet/capsule daily    Per Insurer Portal: last filled on 3/10/24 for 60 day supply. New 90DS script sent and RTS 24    Per [facility]: will get 90 day supply ready to  since past due.  Old RX#[id number]    Lab Results   Component Value Date    CHOL 154 2023    TRIG 139 2023    HDL 45 2023    LDLDIRECT 113 (H) 06/15/2020     Lab Results   Component Value Date    LDL 81 2023    LDLDIRECT 113 (H) 06/15/2020      ALT   Date Value Ref Range Status   2024 16 10 - 40 U/L Final     AST   Date Value Ref Range Status   2024 14 (L) 15 - 37 IU/L Final     The 10-year ASCVD risk score (David DK, et al., 2019) is: 38.4%    Values used to calculate the score:      Age: 74 years      Sex: Female      Is Non- : No      Diabetic: Yes      Tobacco smoker: No      Systolic Blood Pressure: 126 mmHg      Is BP treated: Yes      HDL Cholesterol: 45 MG/DL      Total Cholesterol: 154 MG/DL       The following are interventions that have been identified:   Patient OVERDUE refilling Simvastatin 20mg and active on home medication list.     Attempting to reach patient to review.  Left message asking for return call. [employer] message sent to patient.      Last Visit: 24  Next Visit: 24      Liz Goodwin CPhT.   [facility] Clinical   [facility]  Toll free: [phone] Option 1     For Pharmacy Admin Tracking Only    Program: QBE  CPA in place:  No  Recommendation Provided To: Pharmacy:

## 2024-06-27 DIAGNOSIS — F32.A DEPRESSION, UNSPECIFIED DEPRESSION TYPE: ICD-10-CM

## 2024-06-27 RX ORDER — ESCITALOPRAM OXALATE 10 MG/1
10 TABLET ORAL DAILY
Qty: 90 TABLET | Refills: 1 | Status: SHIPPED | OUTPATIENT
Start: 2024-06-27

## 2024-07-09 ENCOUNTER — TELEPHONE (OUTPATIENT)
Dept: INTERNAL MEDICINE CLINIC | Age: 74
End: 2024-07-09

## 2024-07-09 NOTE — TELEPHONE ENCOUNTER
Got a call from victor manuel Rizzo home care nurse with [facility]. Asked about provider sending in prescription for nitroglycerin for the patient. Asked for call back at [phone].

## 2024-07-11 ENCOUNTER — TELEPHONE (OUTPATIENT)
Dept: CARDIOLOGY CLINIC | Age: 74
End: 2024-07-11

## 2024-07-11 NOTE — TELEPHONE ENCOUNTER
Called patient and advised patient to call her primary cardiologist Dr Stanford Hope at [facility].  Advised patient if having acute chest pain then patient needs to seek treatment at local ED. Patient stated understanding.

## 2024-07-11 NOTE — TELEPHONE ENCOUNTER
So hart/ [facility] called   Stated that patient had been complaining   Of chest pain , she had asked PCP  To prescribe Nitro but was advised to   Call cardiologist

## 2024-08-01 ENCOUNTER — OFFICE VISIT (OUTPATIENT)
Dept: NEUROLOGY | Age: 74
End: 2024-08-01
Payer: MEDICARE

## 2024-08-01 VITALS
BODY MASS INDEX: 46.77 KG/M2 | HEART RATE: 77 BPM | OXYGEN SATURATION: 97 % | HEIGHT: 60 IN | SYSTOLIC BLOOD PRESSURE: 126 MMHG | DIASTOLIC BLOOD PRESSURE: 74 MMHG | WEIGHT: 238.2 LBS

## 2024-08-01 DIAGNOSIS — G25.0 BENIGN ESSENTIAL TREMOR: Primary | ICD-10-CM

## 2024-08-01 PROCEDURE — 1123F ACP DISCUSS/DSCN MKR DOCD: CPT | Performed by: STUDENT IN AN ORGANIZED HEALTH CARE EDUCATION/TRAINING PROGRAM

## 2024-08-01 PROCEDURE — 3078F DIAST BP <80 MM HG: CPT | Performed by: STUDENT IN AN ORGANIZED HEALTH CARE EDUCATION/TRAINING PROGRAM

## 2024-08-01 PROCEDURE — 99205 OFFICE O/P NEW HI 60 MIN: CPT | Performed by: STUDENT IN AN ORGANIZED HEALTH CARE EDUCATION/TRAINING PROGRAM

## 2024-08-01 PROCEDURE — 3074F SYST BP LT 130 MM HG: CPT | Performed by: STUDENT IN AN ORGANIZED HEALTH CARE EDUCATION/TRAINING PROGRAM

## 2024-08-01 RX ORDER — DIPHENHYDRAMINE HCL 25 MG
25 CAPSULE ORAL NIGHTLY PRN
COMMUNITY

## 2024-08-01 NOTE — PROGRESS NOTES
Not on file   Housing Stability: Unknown (6/12/2023)    Housing Stability Vital Sign     Unable to Pay for Housing in the Last Year: Not on file     Number of Places Lived in the Last Year: Not on file     Unstable Housing in the Last Year: No    Patient denies others  Family History   Adopted: Yes   Problem Relation Age of Onset    Heart Disease Mother     Mental Illness Mother     Other Father         killed in the Endorphin War-no remains found    No Known Problems Sister     No Known Problems Brother     Parkinson's Disease Maternal Aunt     Dementia Maternal Aunt     Breast Cancer Neg Hx     Ovarian Cancer Neg Hx     Patient denies others.       ROS (10 systems)  10 point review of systems was completed and was negative except the above.     Physical Exam:       [unfilled]   Wt Readings from Last 3 Encounters:   08/01/24 108 kg (238 lb 3.2 oz)   07/24/24 107 kg (236 lb)   04/16/24 107.2 kg (236 lb 6.4 oz)     Temp Readings from Last 3 Encounters:   07/24/24 96.9 °F (36.1 °C)   03/14/24 96.9 °F (36.1 °C)   02/07/24 97 °F (36.1 °C)     BP Readings from Last 3 Encounters:   08/01/24 126/74   04/16/24 126/84   02/07/24 (!) 148/98     Pulse Readings from Last 3 Encounters:   08/01/24 77   07/24/24 97   04/16/24 88        Gen: A&O x 4, NAD, cooperative  HEENT: NC/AT, EOMI, PERRL, mmm, neck supple, no meningeal signs;  Heart: without central or acrocyanosis  Lungs: Without signs of respiratory distress  Ext: Mild bilateral nonpitting edema  Psych: normal mood and affect  Skin: no rashes or lesions    NEUROLOGIC EXAM:     Mental Status: A&O to self, location, month and year, NAD, speech clear, language fluent, repetition and naming intact, follows commands appropriately    Cranial Nerve Exam:   CN II-XII: PERRL, VFF, no nystagmus, no gaze paresis, sensation V1-V3 intact b/l, muscles of facial expression symmetric; hearing intact to conversational tone, palate elevates symmetrically, shoulder elevation symmetric and

## 2024-09-25 ENCOUNTER — HOSPITAL ENCOUNTER (OUTPATIENT)
Dept: MRI IMAGING | Age: 74
Discharge: HOME | End: 2024-09-25
Attending: FAMILY MEDICINE
Payer: MEDICARE

## 2024-09-25 DIAGNOSIS — M17.11 ARTHRITIS OF RIGHT KNEE: ICD-10-CM

## 2024-09-25 DIAGNOSIS — M25.561 RIGHT KNEE PAIN, UNSPECIFIED CHRONICITY: ICD-10-CM

## 2024-09-25 DIAGNOSIS — E66.09 OTHER OBESITY DUE TO EXCESS CALORIES: ICD-10-CM

## 2024-09-25 PROCEDURE — 73721 MRI JNT OF LWR EXTRE W/O DYE: CPT

## 2024-10-02 DIAGNOSIS — E78.5 HYPERLIPIDEMIA, UNSPECIFIED HYPERLIPIDEMIA TYPE: ICD-10-CM

## 2024-10-02 RX ORDER — SIMVASTATIN 20 MG
20 TABLET ORAL EVERY EVENING
Qty: 90 TABLET | Refills: 0 | Status: SHIPPED | OUTPATIENT
Start: 2024-10-02

## 2024-10-09 ENCOUNTER — HOSPITAL ENCOUNTER (OUTPATIENT)
Age: 74
Discharge: HOME | End: 2024-10-09
Payer: MEDICARE

## 2024-10-09 ENCOUNTER — HOSPITAL ENCOUNTER (OUTPATIENT)
Dept: GENERAL RADIOLOGY | Age: 74
Discharge: HOME | End: 2024-10-09
Payer: MEDICARE

## 2024-10-09 DIAGNOSIS — R06.02 SOB (SHORTNESS OF BREATH): ICD-10-CM

## 2024-10-09 PROCEDURE — 71046 X-RAY EXAM CHEST 2 VIEWS: CPT

## 2024-10-19 ENCOUNTER — HOSPITAL ENCOUNTER (OUTPATIENT)
Age: 74
Discharge: HOME | End: 2024-10-19
Payer: MEDICARE

## 2024-10-19 DIAGNOSIS — E78.5 HYPERLIPIDEMIA, UNSPECIFIED HYPERLIPIDEMIA TYPE: ICD-10-CM

## 2024-10-19 LAB
CHOLEST SERPL-MCNC: 161 MG/DL (ref 125–199)
HDLC SERPL-MCNC: 50 MG/DL
LDLC SERPL CALC-MCNC: 75 MG/DL
TRIGL SERPL-MCNC: 180 MG/DL

## 2024-10-19 PROCEDURE — 80061 LIPID PANEL: CPT

## 2024-10-19 PROCEDURE — 36415 COLL VENOUS BLD VENIPUNCTURE: CPT

## 2024-10-19 SDOH — ECONOMIC STABILITY: FOOD INSECURITY: WITHIN THE PAST 12 MONTHS, YOU WORRIED THAT YOUR FOOD WOULD RUN OUT BEFORE YOU GOT MONEY TO BUY MORE.: NEVER TRUE

## 2024-10-19 SDOH — ECONOMIC STABILITY: INCOME INSECURITY: HOW HARD IS IT FOR YOU TO PAY FOR THE VERY BASICS LIKE FOOD, HOUSING, MEDICAL CARE, AND HEATING?: NOT HARD AT ALL

## 2024-10-19 SDOH — ECONOMIC STABILITY: TRANSPORTATION INSECURITY
IN THE PAST 12 MONTHS, HAS LACK OF TRANSPORTATION KEPT YOU FROM MEETINGS, WORK, OR FROM GETTING THINGS NEEDED FOR DAILY LIVING?: NO

## 2024-10-19 SDOH — ECONOMIC STABILITY: FOOD INSECURITY: WITHIN THE PAST 12 MONTHS, THE FOOD YOU BOUGHT JUST DIDN'T LAST AND YOU DIDN'T HAVE MONEY TO GET MORE.: NEVER TRUE

## 2024-10-22 ENCOUNTER — OFFICE VISIT (OUTPATIENT)
Dept: INTERNAL MEDICINE CLINIC | Age: 74
End: 2024-10-22

## 2024-10-22 VITALS
SYSTOLIC BLOOD PRESSURE: 118 MMHG | DIASTOLIC BLOOD PRESSURE: 72 MMHG | WEIGHT: 243 LBS | HEART RATE: 85 BPM | BODY MASS INDEX: 45.91 KG/M2 | OXYGEN SATURATION: 96 %

## 2024-10-22 DIAGNOSIS — Z79.899 LONG TERM USE OF DRUG: ICD-10-CM

## 2024-10-22 DIAGNOSIS — Z79.4 TYPE 2 DIABETES MELLITUS WITHOUT COMPLICATION, WITH LONG-TERM CURRENT USE OF INSULIN (HCC): Primary | ICD-10-CM

## 2024-10-22 DIAGNOSIS — F33.0 MILD EPISODE OF RECURRENT MAJOR DEPRESSIVE DISORDER (HCC): ICD-10-CM

## 2024-10-22 DIAGNOSIS — E78.5 HYPERLIPIDEMIA, UNSPECIFIED HYPERLIPIDEMIA TYPE: ICD-10-CM

## 2024-10-22 DIAGNOSIS — E11.9 TYPE 2 DIABETES MELLITUS WITHOUT COMPLICATION, WITH LONG-TERM CURRENT USE OF INSULIN (HCC): Primary | ICD-10-CM

## 2024-10-22 DIAGNOSIS — I42.8 NONISCHEMIC CARDIOMYOPATHY (HCC): ICD-10-CM

## 2024-10-22 PROBLEM — I50.20 SYSTOLIC CONGESTIVE HEART FAILURE, UNSPECIFIED HF CHRONICITY (HCC): Status: RESOLVED | Noted: 2024-01-26 | Resolved: 2024-10-22

## 2024-10-22 RX ORDER — NITROGLYCERIN 0.4 MG/1
0.4 TABLET SUBLINGUAL EVERY 5 MIN PRN
COMMUNITY

## 2024-10-22 ASSESSMENT — ENCOUNTER SYMPTOMS
BACK PAIN: 0
SHORTNESS OF BREATH: 1
ABDOMINAL PAIN: 0
COUGH: 0
NAUSEA: 0

## 2024-10-22 NOTE — PROGRESS NOTES
Component Value Date/Time    COLORU YELLOW 07/02/2018 09:00 AM    NITRU NEGATIVE 07/02/2018 09:00 AM    GLUCOSEU 500 mg/dL 11/14/2023 03:19 PM    KETUA Negative 11/14/2023 03:19 PM    KETUA NEGATIVE 07/02/2018 09:00 AM    UROBILINOGEN NORMAL 07/02/2018 09:00 AM    BILIRUBINUR Negative 11/14/2023 03:19 PM       Subjective   SUBJECTIVE/OBJECTIVE:    HISTORY OF PRESENT ILLNESS:      History of Present Illness  The patient is a 74-year-old female who presents today for type 2 diabetes, hyperlipidemia, and major depressive disorder. She is stable on her medications.    She has known nonischemic cardiomyopathy and is under the care of a cardiologist.Patient has ICD. She occasionally uses nitroglycerin tablets. She reports no chest pain or shortness of breath. Her labs have been reviewed and there has been an improvement in her cholesterol levels. Her condition has remained stable with her current medications.      Patient Active Problem List    Diagnosis Date Noted    PVC (premature ventricular contraction) 01/26/2023    Biventricular ICD (implantable cardioverter-defibrillator) in place 12/06/2022    PMB (postmenopausal bleeding)     Vaginal discharge     Hyperlipidemia 09/19/2020    Non-ischemic cardiomyopathy (HCC)     Essential hypertension     Type 2 diabetes mellitus without complication, with long-term current use of insulin (HCC) 09/14/2020    Depression 09/14/2020    YOLANDA on CPAP 02/20/2017    Centrilobular emphysema (HCC) 10/22/2015    Mild intermittent asthma without complication 10/22/2015        Review of Systems   Constitutional:  Negative for diaphoresis and fever.   Respiratory:  Positive for shortness of breath (chronic- mild-stable). Negative for cough.    Cardiovascular:  Negative for chest pain and palpitations.   Gastrointestinal:  Negative for abdominal pain and nausea.   Genitourinary:  Negative for difficulty urinating.   Musculoskeletal:  Negative for back pain.   Neurological:  Negative for

## 2024-10-22 NOTE — PATIENT INSTRUCTIONS
We are committed to providing you the best care possible.    If you receive a survey after visiting one of our offices, please take time to share your experience concerning your physician office visit.  These surveys are confidential and no health information about you is shared.    We are eager to improve for you and continue to give you satisfactory care, we are counting on your feedback to help make that happen.

## 2024-10-26 SDOH — HEALTH STABILITY: PHYSICAL HEALTH: ON AVERAGE, HOW MANY MINUTES DO YOU ENGAGE IN EXERCISE AT THIS LEVEL?: 10 MIN

## 2024-10-26 SDOH — ECONOMIC STABILITY: FOOD INSECURITY: WITHIN THE PAST 12 MONTHS, THE FOOD YOU BOUGHT JUST DIDN'T LAST AND YOU DIDN'T HAVE MONEY TO GET MORE.: NEVER TRUE

## 2024-10-26 SDOH — ECONOMIC STABILITY: FOOD INSECURITY: WITHIN THE PAST 12 MONTHS, YOU WORRIED THAT YOUR FOOD WOULD RUN OUT BEFORE YOU GOT MONEY TO BUY MORE.: NEVER TRUE

## 2024-10-26 SDOH — ECONOMIC STABILITY: INCOME INSECURITY: HOW HARD IS IT FOR YOU TO PAY FOR THE VERY BASICS LIKE FOOD, HOUSING, MEDICAL CARE, AND HEATING?: NOT HARD AT ALL

## 2024-10-26 SDOH — HEALTH STABILITY: PHYSICAL HEALTH: ON AVERAGE, HOW MANY DAYS PER WEEK DO YOU ENGAGE IN MODERATE TO STRENUOUS EXERCISE (LIKE A BRISK WALK)?: 2 DAYS

## 2024-10-26 ASSESSMENT — LIFESTYLE VARIABLES
HOW MANY STANDARD DRINKS CONTAINING ALCOHOL DO YOU HAVE ON A TYPICAL DAY: 0
HOW OFTEN DO YOU HAVE A DRINK CONTAINING ALCOHOL: NEVER
HOW OFTEN DO YOU HAVE A DRINK CONTAINING ALCOHOL: 1
HOW MANY STANDARD DRINKS CONTAINING ALCOHOL DO YOU HAVE ON A TYPICAL DAY: PATIENT DOES NOT DRINK
HOW OFTEN DO YOU HAVE SIX OR MORE DRINKS ON ONE OCCASION: 1

## 2024-10-26 ASSESSMENT — PATIENT HEALTH QUESTIONNAIRE - PHQ9
SUM OF ALL RESPONSES TO PHQ9 QUESTIONS 1 & 2: 0
2. FEELING DOWN, DEPRESSED OR HOPELESS: NOT AT ALL
SUM OF ALL RESPONSES TO PHQ QUESTIONS 1-9: 0
SUM OF ALL RESPONSES TO PHQ QUESTIONS 1-9: 0
1. LITTLE INTEREST OR PLEASURE IN DOING THINGS: NOT AT ALL
SUM OF ALL RESPONSES TO PHQ QUESTIONS 1-9: 0
SUM OF ALL RESPONSES TO PHQ QUESTIONS 1-9: 0

## 2024-10-28 ENCOUNTER — TELEMEDICINE (OUTPATIENT)
Dept: INTERNAL MEDICINE CLINIC | Age: 74
End: 2024-10-28

## 2024-10-28 ENCOUNTER — TELEPHONE (OUTPATIENT)
Dept: INTERNAL MEDICINE CLINIC | Age: 74
End: 2024-10-28

## 2024-10-28 DIAGNOSIS — G89.29 CHRONIC PAIN OF RIGHT KNEE: ICD-10-CM

## 2024-10-28 DIAGNOSIS — M25.561 CHRONIC PAIN OF RIGHT KNEE: ICD-10-CM

## 2024-10-28 DIAGNOSIS — R26.9 GAIT DISTURBANCE: Primary | ICD-10-CM

## 2024-10-28 DIAGNOSIS — Z00.00 MEDICARE ANNUAL WELLNESS VISIT, SUBSEQUENT: Primary | ICD-10-CM

## 2024-10-28 ASSESSMENT — PATIENT HEALTH QUESTIONNAIRE - PHQ9
5. POOR APPETITE OR OVEREATING: NOT AT ALL
10. IF YOU CHECKED OFF ANY PROBLEMS, HOW DIFFICULT HAVE THESE PROBLEMS MADE IT FOR YOU TO DO YOUR WORK, TAKE CARE OF THINGS AT HOME, OR GET ALONG WITH OTHER PEOPLE: NOT DIFFICULT AT ALL
SUM OF ALL RESPONSES TO PHQ9 QUESTIONS 1 & 2: 0
SUM OF ALL RESPONSES TO PHQ QUESTIONS 1-9: 1
SUM OF ALL RESPONSES TO PHQ QUESTIONS 1-9: 1
3. TROUBLE FALLING OR STAYING ASLEEP: SEVERAL DAYS
7. TROUBLE CONCENTRATING ON THINGS, SUCH AS READING THE NEWSPAPER OR WATCHING TELEVISION: NOT AT ALL
1. LITTLE INTEREST OR PLEASURE IN DOING THINGS: NOT AT ALL
4. FEELING TIRED OR HAVING LITTLE ENERGY: NOT AT ALL
6. FEELING BAD ABOUT YOURSELF - OR THAT YOU ARE A FAILURE OR HAVE LET YOURSELF OR YOUR FAMILY DOWN: NOT AT ALL
2. FEELING DOWN, DEPRESSED OR HOPELESS: NOT AT ALL
8. MOVING OR SPEAKING SO SLOWLY THAT OTHER PEOPLE COULD HAVE NOTICED. OR THE OPPOSITE, BEING SO FIGETY OR RESTLESS THAT YOU HAVE BEEN MOVING AROUND A LOT MORE THAN USUAL: NOT AT ALL
SUM OF ALL RESPONSES TO PHQ QUESTIONS 1-9: 1
9. THOUGHTS THAT YOU WOULD BE BETTER OFF DEAD, OR OF HURTING YOURSELF: NOT AT ALL
SUM OF ALL RESPONSES TO PHQ QUESTIONS 1-9: 1

## 2024-10-28 ASSESSMENT — LIFESTYLE VARIABLES
HOW MANY STANDARD DRINKS CONTAINING ALCOHOL DO YOU HAVE ON A TYPICAL DAY: PATIENT DOES NOT DRINK
HOW OFTEN DO YOU HAVE A DRINK CONTAINING ALCOHOL: NEVER

## 2024-10-28 NOTE — PATIENT INSTRUCTIONS
Personalized Preventive Plan for Cielo Howard - 10/28/2024  Medicare offers a range of preventive health benefits. Some of the tests and screenings are paid in full while other may be subject to a deductible, co-insurance, and/or copay.    Some of these benefits include a comprehensive review of your medical history including lifestyle, illnesses that may run in your family, and various assessments and screenings as appropriate.    After reviewing your medical record and screening and assessments performed today your provider may have ordered immunizations, labs, imaging, and/or referrals for you.  A list of these orders (if applicable) as well as your Preventive Care list are included within your After Visit Summary for your review.    Other Preventive Recommendations:    A preventive eye exam performed by an eye specialist is recommended every 1-2 years to screen for glaucoma; cataracts, macular degeneration, and other eye disorders.  A preventive dental visit is recommended every 6 months.  Try to get at least 150 minutes of exercise per week or 10,000 steps per day on a pedometer .  Order or download the FREE \"Exercise & Physical Activity: Your Everyday Guide\" from [employer]. Call 1-611.293.7984 or search [employer] online.  You need 1354-6998 mg of calcium and 4104-0336 IU of vitamin D per day. It is possible to meet your calcium requirement with diet alone, but a vitamin D supplement is usually necessary to meet this goal.  When exposed to the sun, use a sunscreen that protects against both UVA and UVB radiation with an SPF of 30 or greater. Reapply every 2 to 3 hours or after sweating, drying off with a towel, or swimming.  Always wear a seat belt when traveling in a car. Always wear a helmet when riding a bicycle or motorcycle.

## 2024-10-28 NOTE — TELEPHONE ENCOUNTER
During AWV, patient asked about a raised seat for her toilet. Informed patient she can  the prescription for this. Recommend she try the medical supply stores, or some of the pharmacies, like [address], [facility], [facility]. Patient voiced understanding. She states she will pick the order this afternoon.

## 2024-10-28 NOTE — PROGRESS NOTES
Medicare Annual Wellness Visit    Cielo Howard is here for Medicare AWV    Assessment & Plan   Medicare annual wellness visit, subsequent  Recommendations for Preventive Services Due: see orders and patient instructions/AVS.  Recommended screening schedule for the next 5-10 years is provided to the patient in written form: see Patient Instructions/AVS.     No follow-ups on file.     Subjective       Patient's complete Health Risk Assessment and screening values have been reviewed and are found in Flowsheets. The following problems were reviewed today and where indicated follow up appointments were made and/or referrals ordered.    Positive Risk Factor Screenings with Interventions:    Fall Risk:  Do you feel unsteady or are you worried about falling? : (!) yes (cane always)  2 or more falls in past year?: no  Fall with injury in past year?: (!) yes     Interventions:    Patient comments: patient states she always uses a cane for stability.  Reviewed medications, home hazards, visual acuity, and co-morbidities that can increase risk for falls  Patient declines any further evaluation or treatment             Inactivity:  On average, how many days per week do you engage in moderate to strenuous exercise (like a brisk walk)?: 2 days (!) Abnormal  On average, how many minutes do you engage in exercise at this level?: 10 min  Interventions:  Patient declined any further interventions or treatment     Abnormal BMI (obese):  There is no height or weight on file to calculate BMI. (!) Abnormal  Interventions:  Patient declines any further evaluation or treatment            ADL's:   Patient reports needing help with:  Select all that apply: (!) Walking/Balance (HHA)  Select all that apply: (!) Food Preparation, Housekeeping, Laundry, Shopping, Transportation (MOW (Mon-Fri, 1 meal); HHA; pt does still drive)  Interventions:  Patient comments: states she has a Home Health Aide who helps her. She receives one meal per day

## 2024-12-03 DIAGNOSIS — E78.5 HYPERLIPIDEMIA, UNSPECIFIED HYPERLIPIDEMIA TYPE: ICD-10-CM

## 2024-12-03 RX ORDER — SIMVASTATIN 20 MG
20 TABLET ORAL EVERY EVENING
Qty: 90 TABLET | Refills: 1 | Status: SHIPPED | OUTPATIENT
Start: 2024-12-03

## 2024-12-24 DIAGNOSIS — F32.A DEPRESSION, UNSPECIFIED DEPRESSION TYPE: ICD-10-CM

## 2024-12-27 RX ORDER — ESCITALOPRAM OXALATE 10 MG/1
10 TABLET ORAL DAILY
Qty: 90 TABLET | Refills: 1 | Status: SHIPPED | OUTPATIENT
Start: 2024-12-27

## 2025-04-10 ENCOUNTER — HOSPITAL ENCOUNTER (OUTPATIENT)
Dept: LAB | Age: 75
Discharge: HOME | End: 2025-04-10
Payer: MEDICARE

## 2025-04-10 ENCOUNTER — RESULTS FOLLOW-UP (OUTPATIENT)
Dept: INTERNAL MEDICINE CLINIC | Age: 75
End: 2025-04-10

## 2025-04-10 DIAGNOSIS — E11.9 TYPE 2 DIABETES MELLITUS WITHOUT COMPLICATION, WITH LONG-TERM CURRENT USE OF INSULIN: ICD-10-CM

## 2025-04-10 DIAGNOSIS — Z79.4 TYPE 2 DIABETES MELLITUS WITHOUT COMPLICATION, WITH LONG-TERM CURRENT USE OF INSULIN: ICD-10-CM

## 2025-04-10 DIAGNOSIS — E78.5 HYPERLIPIDEMIA, UNSPECIFIED HYPERLIPIDEMIA TYPE: ICD-10-CM

## 2025-04-10 DIAGNOSIS — Z79.899 LONG TERM USE OF DRUG: ICD-10-CM

## 2025-04-10 LAB
ALBUMIN SERPL-MCNC: 4.3 G/DL (ref 3.4–5)
ALBUMIN/GLOB SERPL: 1.5 {RATIO} (ref 1.1–2.2)
ALP SERPL-CCNC: 104 U/L (ref 40–129)
ALT SERPL-CCNC: 27 U/L (ref 10–40)
ANION GAP SERPL CALCULATED.3IONS-SCNC: 14 MMOL/L (ref 9–17)
AST SERPL-CCNC: 20 U/L (ref 15–37)
BASOPHILS # BLD: 0.03 K/UL
BASOPHILS NFR BLD: 0 % (ref 0–1)
BILIRUB SERPL-MCNC: 0.3 MG/DL (ref 0–1)
BUN SERPL-MCNC: 18 MG/DL (ref 7–20)
CALCIUM SERPL-MCNC: 9.8 MG/DL (ref 8.3–10.6)
CHLORIDE SERPL-SCNC: 101 MMOL/L (ref 99–110)
CHOLEST SERPL-MCNC: 176 MG/DL (ref 125–199)
CO2 SERPL-SCNC: 22 MMOL/L (ref 21–32)
CREAT SERPL-MCNC: 0.8 MG/DL (ref 0.6–1.2)
EOSINOPHIL # BLD: 0.34 K/UL
EOSINOPHILS RELATIVE PERCENT: 4 % (ref 0–3)
ERYTHROCYTE [DISTWIDTH] IN BLOOD BY AUTOMATED COUNT: 12.9 % (ref 11.7–14.9)
EST. AVERAGE GLUCOSE BLD GHB EST-MCNC: 158 MG/DL
GFR, ESTIMATED: 66 ML/MIN/1.73M2
GLUCOSE SERPL-MCNC: 177 MG/DL (ref 74–99)
HBA1C MFR BLD: 7.1 % (ref 4.2–6.3)
HCT VFR BLD AUTO: 40.9 % (ref 37–47)
HDLC SERPL-MCNC: 45 MG/DL
HGB BLD-MCNC: 12.7 G/DL (ref 12.5–16)
IMM GRANULOCYTES # BLD AUTO: 0.03 K/UL
IMM GRANULOCYTES NFR BLD: 0 %
LDLC SERPL CALC-MCNC: 77 MG/DL
LYMPHOCYTES NFR BLD: 1.97 K/UL
LYMPHOCYTES RELATIVE PERCENT: 23 % (ref 24–44)
MCH RBC QN AUTO: 28.7 PG (ref 27–31)
MCHC RBC AUTO-ENTMCNC: 31.1 G/DL (ref 32–36)
MCV RBC AUTO: 92.3 FL (ref 78–100)
MONOCYTES NFR BLD: 0.55 K/UL
MONOCYTES NFR BLD: 7 % (ref 0–4)
NEUTROPHILS NFR BLD: 65 % (ref 36–66)
NEUTS SEG NFR BLD: 5.51 K/UL
PLATELET # BLD AUTO: 236 K/UL (ref 140–440)
PMV BLD AUTO: 9.5 FL (ref 7.5–11.1)
POTASSIUM SERPL-SCNC: 4.6 MMOL/L (ref 3.5–5.1)
PROT SERPL-MCNC: 7.1 G/DL (ref 6.4–8.2)
RBC # BLD AUTO: 4.43 M/UL (ref 4.2–5.4)
SODIUM SERPL-SCNC: 136 MMOL/L (ref 136–145)
TRIGL SERPL-MCNC: 270 MG/DL
WBC OTHER # BLD: 8.4 K/UL (ref 4–10.5)

## 2025-04-10 PROCEDURE — 80061 LIPID PANEL: CPT

## 2025-04-10 PROCEDURE — 80053 COMPREHEN METABOLIC PANEL: CPT

## 2025-04-10 PROCEDURE — 85025 COMPLETE CBC W/AUTO DIFF WBC: CPT

## 2025-04-10 PROCEDURE — 83036 HEMOGLOBIN GLYCOSYLATED A1C: CPT

## 2025-04-17 ENCOUNTER — TELEMEDICINE (OUTPATIENT)
Dept: INTERNAL MEDICINE CLINIC | Age: 75
End: 2025-04-17
Payer: MEDICARE

## 2025-04-17 DIAGNOSIS — E78.2 MIXED HYPERLIPIDEMIA: ICD-10-CM

## 2025-04-17 DIAGNOSIS — Z79.4 TYPE 2 DIABETES MELLITUS WITH HYPERGLYCEMIA, WITH LONG-TERM CURRENT USE OF INSULIN (HCC): Primary | ICD-10-CM

## 2025-04-17 DIAGNOSIS — F33.0 MILD EPISODE OF RECURRENT MAJOR DEPRESSIVE DISORDER: ICD-10-CM

## 2025-04-17 DIAGNOSIS — E11.65 TYPE 2 DIABETES MELLITUS WITH HYPERGLYCEMIA, WITH LONG-TERM CURRENT USE OF INSULIN (HCC): Primary | ICD-10-CM

## 2025-04-17 DIAGNOSIS — I42.8 NONISCHEMIC CARDIOMYOPATHY (HCC): ICD-10-CM

## 2025-04-17 PROCEDURE — 1159F MED LIST DOCD IN RCRD: CPT | Performed by: FAMILY MEDICINE

## 2025-04-17 PROCEDURE — 1160F RVW MEDS BY RX/DR IN RCRD: CPT | Performed by: FAMILY MEDICINE

## 2025-04-17 PROCEDURE — 99214 OFFICE O/P EST MOD 30 MIN: CPT | Performed by: FAMILY MEDICINE

## 2025-04-17 PROCEDURE — 1123F ACP DISCUSS/DSCN MKR DOCD: CPT | Performed by: FAMILY MEDICINE

## 2025-04-17 PROCEDURE — 3051F HG A1C>EQUAL 7.0%<8.0%: CPT | Performed by: FAMILY MEDICINE

## 2025-04-17 SDOH — ECONOMIC STABILITY: FOOD INSECURITY: WITHIN THE PAST 12 MONTHS, THE FOOD YOU BOUGHT JUST DIDN'T LAST AND YOU DIDN'T HAVE MONEY TO GET MORE.: NEVER TRUE

## 2025-04-17 SDOH — ECONOMIC STABILITY: INCOME INSECURITY: IN THE LAST 12 MONTHS, WAS THERE A TIME WHEN YOU WERE NOT ABLE TO PAY THE MORTGAGE OR RENT ON TIME?: NO

## 2025-04-17 SDOH — ECONOMIC STABILITY: TRANSPORTATION INSECURITY
IN THE PAST 12 MONTHS, HAS THE LACK OF TRANSPORTATION KEPT YOU FROM MEDICAL APPOINTMENTS OR FROM GETTING MEDICATIONS?: NO

## 2025-04-17 SDOH — ECONOMIC STABILITY: FOOD INSECURITY: WITHIN THE PAST 12 MONTHS, YOU WORRIED THAT YOUR FOOD WOULD RUN OUT BEFORE YOU GOT MONEY TO BUY MORE.: SOMETIMES TRUE

## 2025-04-17 ASSESSMENT — ENCOUNTER SYMPTOMS
COUGH: 0
SHORTNESS OF BREATH: 0
NAUSEA: 0
ABDOMINAL PAIN: 0

## 2025-04-17 NOTE — PROGRESS NOTES
2025    TELEHEALTH EVALUATION -- Audio/Visual    HPI:    Cielo Howard (:  1950) has requested an audio/video evaluation for the following concern(s):  Patient Active Problem List    Diagnosis Date Noted    PVC (premature ventricular contraction) 2023    Biventricular ICD (implantable cardioverter-defibrillator) in place 2022    Hyperlipidemia 2020    Non-ischemic cardiomyopathy (HCC)     Essential hypertension     Type 2 diabetes mellitus without complication, with long-term current use of insulin 2020    Depression 2020    YOLANDA on CPAP 2017    Centrilobular emphysema (HCC) 10/22/2015    Mild intermittent asthma without complication 10/22/2015     History of Present Illness  The patient is a 75-year-old female who presents today for type 2 diabetes, hyperlipidemia, major depressive disorder, and nonischemic cardiomyopathy.    She has type 2 diabetes mellitus, which she has been trying to manage with her diet. However, her hemoglobin A1c has increased to 7.1. She is interested in starting Jardiance. She was on the medication in the past per cardiology recommendation, but had to stop a couple of years ago due to cost. She has been trying to change her diet and try to make better choices. She has Meals on Wheels    Her major depressive disorder is stable on Lexapro 10 mg tablets.    She has nonischemic cardiomyopathy and was recently seen by cardiology. She reports that her condition is stable at this time.    For hyperlipidemia, she is currently on Zocor 20 mg tablets. Her last labs revealed an increase in triglyceride levels.      Review of Systems   Constitutional:  Negative for diaphoresis and fever.   Respiratory:  Negative for cough and shortness of breath.    Cardiovascular:  Negative for chest pain and palpitations.   Gastrointestinal:  Negative for abdominal pain and nausea.   Neurological:  Negative for dizziness and headaches.   Psychiatric/Behavioral:

## 2025-05-13 DIAGNOSIS — Z79.4 TYPE 2 DIABETES MELLITUS WITH HYPERGLYCEMIA, WITH LONG-TERM CURRENT USE OF INSULIN (HCC): ICD-10-CM

## 2025-05-13 DIAGNOSIS — F32.A DEPRESSION, UNSPECIFIED DEPRESSION TYPE: ICD-10-CM

## 2025-05-13 DIAGNOSIS — E11.65 TYPE 2 DIABETES MELLITUS WITH HYPERGLYCEMIA, WITH LONG-TERM CURRENT USE OF INSULIN (HCC): ICD-10-CM

## 2025-05-13 DIAGNOSIS — E78.5 HYPERLIPIDEMIA, UNSPECIFIED HYPERLIPIDEMIA TYPE: ICD-10-CM

## 2025-05-13 RX ORDER — SIMVASTATIN 20 MG
20 TABLET ORAL EVERY EVENING
Qty: 90 TABLET | Refills: 0 | Status: ON HOLD | OUTPATIENT
Start: 2025-05-13

## 2025-05-13 RX ORDER — EMPAGLIFLOZIN 10 MG/1
10 TABLET, FILM COATED ORAL DAILY
Qty: 30 TABLET | Refills: 0 | OUTPATIENT
Start: 2025-05-13

## 2025-05-13 RX ORDER — ESCITALOPRAM OXALATE 10 MG/1
10 TABLET ORAL DAILY
Qty: 90 TABLET | Refills: 0 | Status: ON HOLD | OUTPATIENT
Start: 2025-05-13

## 2025-05-13 NOTE — TELEPHONE ENCOUNTER
Call patient to schedule a follow up appointment at the end of July 2025. She has labs in [facility] that she can obtain prior to her next visit

## 2025-07-11 ENCOUNTER — HOSPITAL ENCOUNTER (INPATIENT)
Age: 75
LOS: 4 days | Discharge: HOME | DRG: 884 | End: 2025-07-17
Attending: EMERGENCY MEDICINE | Admitting: STUDENT IN AN ORGANIZED HEALTH CARE EDUCATION/TRAINING PROGRAM
Payer: MEDICARE

## 2025-07-11 ENCOUNTER — APPOINTMENT (OUTPATIENT)
Dept: CT IMAGING | Age: 75
DRG: 884 | End: 2025-07-11
Payer: MEDICARE

## 2025-07-11 DIAGNOSIS — R26.81 UNSTEADY GAIT: ICD-10-CM
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
## 2025-07-11 DIAGNOSIS — S09.90XA CLOSED HEAD INJURY, INITIAL ENCOUNTER: ICD-10-CM

## 2025-07-11 LAB
BASOPHILS # BLD: 0.03 K/UL
BASOPHILS NFR BLD: 0 % (ref 0–1)
EOSINOPHIL # BLD: 0.36 K/UL
EOSINOPHILS RELATIVE PERCENT: 4 % (ref 0–3)
ERYTHROCYTE [DISTWIDTH] IN BLOOD BY AUTOMATED COUNT: 13.4 % (ref 11.7–14.9)
HCT VFR BLD AUTO: 40.8 % (ref 37–47)
HGB BLD-MCNC: 12.7 G/DL (ref 12.5–16)
IMM GRANULOCYTES # BLD AUTO: 0.04 K/UL
IMM GRANULOCYTES NFR BLD: 0 %
LYMPHOCYTES NFR BLD: 3.11 K/UL
LYMPHOCYTES RELATIVE PERCENT: 31 % (ref 24–44)
MCH RBC QN AUTO: 28.3 PG (ref 27–31)
MCHC RBC AUTO-ENTMCNC: 31.1 G/DL (ref 32–36)
MCV RBC AUTO: 91.1 FL (ref 78–100)
MONOCYTES NFR BLD: 0.67 K/UL
MONOCYTES NFR BLD: 7 % (ref 0–5)
NEUTROPHILS NFR BLD: 58 % (ref 36–66)
NEUTS SEG NFR BLD: 5.69 K/UL
PLATELET # BLD AUTO: 254 K/UL (ref 140–440)
PMV BLD AUTO: 9.5 FL (ref 7.5–11.1)
RBC # BLD AUTO: 4.48 M/UL (ref 4.2–5.4)
WBC OTHER # BLD: 9.9 K/UL (ref 4–10.5)

## 2025-07-11 PROCEDURE — 96375 TX/PRO/DX INJ NEW DRUG ADDON: CPT

## 2025-07-11 PROCEDURE — 99285 EMERGENCY DEPT VISIT HI MDM: CPT

## 2025-07-11 PROCEDURE — 72125 CT NECK SPINE W/O DYE: CPT

## 2025-07-11 PROCEDURE — 80048 BASIC METABOLIC PNL TOTAL CA: CPT

## 2025-07-11 PROCEDURE — 6360000002 HC RX W HCPCS: Performed by: EMERGENCY MEDICINE

## 2025-07-11 PROCEDURE — 94761 N-INVAS EAR/PLS OXIMETRY MLT: CPT

## 2025-07-11 PROCEDURE — 96374 THER/PROPH/DIAG INJ IV PUSH: CPT

## 2025-07-11 PROCEDURE — 70450 CT HEAD/BRAIN W/O DYE: CPT

## 2025-07-11 PROCEDURE — 71260 CT THORAX DX C+: CPT

## 2025-07-11 PROCEDURE — 6360000004 HC RX CONTRAST MEDICATION: Performed by: EMERGENCY MEDICINE

## 2025-07-11 PROCEDURE — 72132 CT LUMBAR SPINE W/DYE: CPT

## 2025-07-11 PROCEDURE — 85025 COMPLETE CBC W/AUTO DIFF WBC: CPT

## 2025-07-11 PROCEDURE — 72129 CT CHEST SPINE W/DYE: CPT

## 2025-07-11 RX ORDER — ONDANSETRON 2 MG/ML
4 INJECTION INTRAMUSCULAR; INTRAVENOUS ONCE
Status: COMPLETED | OUTPATIENT
Start: 2025-07-11 | End: 2025-07-11

## 2025-07-11 RX ORDER — IOPAMIDOL 755 MG/ML
75 INJECTION, SOLUTION INTRAVASCULAR
Status: COMPLETED | OUTPATIENT
Start: 2025-07-11 | End: 2025-07-11

## 2025-07-11 RX ORDER — FENTANYL CITRATE 50 UG/ML
25 INJECTION, SOLUTION INTRAMUSCULAR; INTRAVENOUS ONCE
Refills: 0 | Status: COMPLETED | OUTPATIENT
Start: 2025-07-11 | End: 2025-07-11

## 2025-07-11 RX ADMIN — FENTANYL CITRATE 25 MCG: 50 INJECTION, SOLUTION INTRAMUSCULAR; INTRAVENOUS at 22:30

## 2025-07-11 RX ADMIN — IOPAMIDOL 75 ML: 755 INJECTION, SOLUTION INTRAVENOUS at 21:53

## 2025-07-11 RX ADMIN — ONDANSETRON 4 MG: 2 INJECTION INTRAMUSCULAR; INTRAVENOUS at 22:29

## 2025-07-11 ASSESSMENT — PAIN SCALES - GENERAL
PAINLEVEL_OUTOF10: 2
PAINLEVEL_OUTOF10: 6
PAINLEVEL_OUTOF10: 6

## 2025-07-11 ASSESSMENT — PAIN DESCRIPTION - DESCRIPTORS
DESCRIPTORS: ACHING

## 2025-07-11 ASSESSMENT — PAIN DESCRIPTION - LOCATION
LOCATION: NECK;HEAD
LOCATION: NECK;HEAD
LOCATION: HEAD;NECK

## 2025-07-11 ASSESSMENT — PAIN - FUNCTIONAL ASSESSMENT: PAIN_FUNCTIONAL_ASSESSMENT: 0-10

## 2025-07-12 PROBLEM — R53.81 DEBILITY: Status: ACTIVE | Noted: 2025-07-12

## 2025-07-12 LAB
ALBUMIN SERPL-MCNC: 3.9 G/DL (ref 3.4–5)
ALBUMIN/GLOB SERPL: 1.4 {RATIO} (ref 1.1–2.2)
ALP SERPL-CCNC: 112 U/L (ref 40–129)
ALT SERPL-CCNC: 18 U/L (ref 10–40)
ANION GAP SERPL CALCULATED.3IONS-SCNC: 13 MMOL/L (ref 9–17)
ANION GAP SERPL CALCULATED.3IONS-SCNC: 17 MMOL/L (ref 9–17)
AST SERPL-CCNC: 18 U/L (ref 15–37)
BASOPHILS # BLD: 0.03 K/UL
BASOPHILS NFR BLD: 0 % (ref 0–1)
BILIRUB SERPL-MCNC: 0.4 MG/DL (ref 0–1)
BUN SERPL-MCNC: 17 MG/DL (ref 7–20)
BUN SERPL-MCNC: 18 MG/DL (ref 7–20)
CALCIUM SERPL-MCNC: 10 MG/DL (ref 8.3–10.6)
CALCIUM SERPL-MCNC: 10.5 MG/DL (ref 8.3–10.6)
CHLORIDE SERPL-SCNC: 102 MMOL/L (ref 99–110)
CHLORIDE SERPL-SCNC: 99 MMOL/L (ref 99–110)
CO2 SERPL-SCNC: 18 MMOL/L (ref 21–32)
CO2 SERPL-SCNC: 18 MMOL/L (ref 21–32)
CREAT SERPL-MCNC: 0.7 MG/DL (ref 0.6–1.2)
CREAT SERPL-MCNC: 0.7 MG/DL (ref 0.6–1.2)
EOSINOPHIL # BLD: 0.11 K/UL
EOSINOPHILS RELATIVE PERCENT: 1 % (ref 0–3)
ERYTHROCYTE [DISTWIDTH] IN BLOOD BY AUTOMATED COUNT: 13.6 % (ref 11.7–14.9)
GFR, ESTIMATED: 78 ML/MIN/1.73M2
GFR, ESTIMATED: 83 ML/MIN/1.73M2
GLUCOSE BLD-MCNC: 139 MG/DL (ref 74–99)
GLUCOSE BLD-MCNC: 153 MG/DL (ref 74–99)
GLUCOSE BLD-MCNC: 174 MG/DL (ref 74–99)
GLUCOSE BLD-MCNC: 178 MG/DL (ref 74–99)
GLUCOSE SERPL-MCNC: 151 MG/DL (ref 74–99)
GLUCOSE SERPL-MCNC: 159 MG/DL (ref 74–99)
HCT VFR BLD AUTO: 41.7 % (ref 37–47)
HGB BLD-MCNC: 12.9 G/DL (ref 12.5–16)
IMM GRANULOCYTES # BLD AUTO: 0.03 K/UL
IMM GRANULOCYTES NFR BLD: 0 %
INR PPP: 1
LYMPHOCYTES NFR BLD: 2.7 K/UL
LYMPHOCYTES RELATIVE PERCENT: 25 % (ref 24–44)
MCH RBC QN AUTO: 27.8 PG (ref 27–31)
MCHC RBC AUTO-ENTMCNC: 30.9 G/DL (ref 32–36)
MCV RBC AUTO: 89.9 FL (ref 78–100)
MONOCYTES NFR BLD: 0.83 K/UL
MONOCYTES NFR BLD: 8 % (ref 0–5)
NEUTROPHILS NFR BLD: 65 % (ref 36–66)
NEUTS SEG NFR BLD: 6.98 K/UL
PLATELET # BLD AUTO: 262 K/UL (ref 140–440)
PMV BLD AUTO: 9.3 FL (ref 7.5–11.1)
POTASSIUM SERPL-SCNC: 4.7 MMOL/L (ref 3.5–5.1)
POTASSIUM SERPL-SCNC: 4.8 MMOL/L (ref 3.5–5.1)
PROT SERPL-MCNC: 6.7 G/DL (ref 6.4–8.2)
PROTHROMBIN TIME: 13.5 SEC (ref 11.7–14.5)
RBC # BLD AUTO: 4.64 M/UL (ref 4.2–5.4)
SODIUM SERPL-SCNC: 133 MMOL/L (ref 136–145)
SODIUM SERPL-SCNC: 133 MMOL/L (ref 136–145)
WBC OTHER # BLD: 10.7 K/UL (ref 4–10.5)

## 2025-07-12 PROCEDURE — 6360000002 HC RX W HCPCS: Performed by: STUDENT IN AN ORGANIZED HEALTH CARE EDUCATION/TRAINING PROGRAM

## 2025-07-12 PROCEDURE — 2500000003 HC RX 250 WO HCPCS: Performed by: STUDENT IN AN ORGANIZED HEALTH CARE EDUCATION/TRAINING PROGRAM

## 2025-07-12 PROCEDURE — G0378 HOSPITAL OBSERVATION PER HR: HCPCS

## 2025-07-12 PROCEDURE — 85025 COMPLETE CBC W/AUTO DIFF WBC: CPT

## 2025-07-12 PROCEDURE — 85610 PROTHROMBIN TIME: CPT

## 2025-07-12 PROCEDURE — 82962 GLUCOSE BLOOD TEST: CPT

## 2025-07-12 PROCEDURE — 80053 COMPREHEN METABOLIC PANEL: CPT

## 2025-07-12 PROCEDURE — 97535 SELF CARE MNGMENT TRAINING: CPT

## 2025-07-12 PROCEDURE — 36415 COLL VENOUS BLD VENIPUNCTURE: CPT

## 2025-07-12 PROCEDURE — 97116 GAIT TRAINING THERAPY: CPT

## 2025-07-12 PROCEDURE — 97166 OT EVAL MOD COMPLEX 45 MIN: CPT

## 2025-07-12 PROCEDURE — 6370000000 HC RX 637 (ALT 250 FOR IP): Performed by: PHYSICIAN ASSISTANT

## 2025-07-12 PROCEDURE — 94660 CPAP INITIATION&MGMT: CPT

## 2025-07-12 PROCEDURE — 97162 PT EVAL MOD COMPLEX 30 MIN: CPT

## 2025-07-12 PROCEDURE — 6370000000 HC RX 637 (ALT 250 FOR IP): Performed by: STUDENT IN AN ORGANIZED HEALTH CARE EDUCATION/TRAINING PROGRAM

## 2025-07-12 PROCEDURE — 94761 N-INVAS EAR/PLS OXIMETRY MLT: CPT

## 2025-07-12 RX ORDER — ATORVASTATIN CALCIUM 10 MG/1
10 TABLET, FILM COATED ORAL DAILY
Status: DISCONTINUED | OUTPATIENT
Start: 2025-07-12 | End: 2025-07-17 | Stop reason: HOSPADM

## 2025-07-12 RX ORDER — SODIUM CHLORIDE 0.9 % (FLUSH) 0.9 %
5-40 SYRINGE (ML) INJECTION PRN
Status: DISCONTINUED | OUTPATIENT
Start: 2025-07-12 | End: 2025-07-17 | Stop reason: HOSPADM

## 2025-07-12 RX ORDER — INSULIN LISPRO 100 [IU]/ML
0-4 INJECTION, SOLUTION INTRAVENOUS; SUBCUTANEOUS
Status: DISCONTINUED | OUTPATIENT
Start: 2025-07-12 | End: 2025-07-15

## 2025-07-12 RX ORDER — MAGNESIUM SULFATE IN WATER 40 MG/ML
2000 INJECTION, SOLUTION INTRAVENOUS PRN
Status: DISCONTINUED | OUTPATIENT
Start: 2025-07-12 | End: 2025-07-17 | Stop reason: HOSPADM

## 2025-07-12 RX ORDER — ENOXAPARIN SODIUM 100 MG/ML
30 INJECTION SUBCUTANEOUS 2 TIMES DAILY
Status: DISCONTINUED | OUTPATIENT
Start: 2025-07-12 | End: 2025-07-17 | Stop reason: HOSPADM

## 2025-07-12 RX ORDER — SPIRONOLACTONE 50 MG/1
25 TABLET, FILM COATED ORAL DAILY
Status: DISCONTINUED | OUTPATIENT
Start: 2025-07-12 | End: 2025-07-17 | Stop reason: HOSPADM

## 2025-07-12 RX ORDER — ESCITALOPRAM OXALATE 10 MG/1
10 TABLET ORAL DAILY
Status: DISCONTINUED | OUTPATIENT
Start: 2025-07-12 | End: 2025-07-17 | Stop reason: HOSPADM

## 2025-07-12 RX ORDER — ONDANSETRON 4 MG/1
4 TABLET, ORALLY DISINTEGRATING ORAL EVERY 8 HOURS PRN
Status: DISCONTINUED | OUTPATIENT
Start: 2025-07-12 | End: 2025-07-17 | Stop reason: HOSPADM

## 2025-07-12 RX ORDER — POTASSIUM CHLORIDE 1500 MG/1
40 TABLET, EXTENDED RELEASE ORAL PRN
Status: DISCONTINUED | OUTPATIENT
Start: 2025-07-12 | End: 2025-07-17 | Stop reason: HOSPADM

## 2025-07-12 RX ORDER — INSULIN LISPRO 100 [IU]/ML
0-4 INJECTION, SOLUTION INTRAVENOUS; SUBCUTANEOUS EVERY 4 HOURS
Status: DISCONTINUED | OUTPATIENT
Start: 2025-07-12 | End: 2025-07-15

## 2025-07-12 RX ORDER — ACETAMINOPHEN 325 MG/1
650 TABLET ORAL EVERY 6 HOURS PRN
Status: DISCONTINUED | OUTPATIENT
Start: 2025-07-12 | End: 2025-07-17 | Stop reason: HOSPADM

## 2025-07-12 RX ORDER — METOPROLOL SUCCINATE 50 MG/1
50 TABLET, EXTENDED RELEASE ORAL DAILY
Status: DISCONTINUED | OUTPATIENT
Start: 2025-07-12 | End: 2025-07-17 | Stop reason: HOSPADM

## 2025-07-12 RX ORDER — GLUCAGON 1 MG/ML
1 KIT INJECTION PRN
Status: DISCONTINUED | OUTPATIENT
Start: 2025-07-12 | End: 2025-07-17 | Stop reason: HOSPADM

## 2025-07-12 RX ORDER — ACETAMINOPHEN 650 MG/1
650 SUPPOSITORY RECTAL EVERY 6 HOURS PRN
Status: DISCONTINUED | OUTPATIENT
Start: 2025-07-12 | End: 2025-07-17 | Stop reason: HOSPADM

## 2025-07-12 RX ORDER — LOSARTAN POTASSIUM 100 MG/1
100 TABLET ORAL DAILY
Status: DISCONTINUED | OUTPATIENT
Start: 2025-07-12 | End: 2025-07-17 | Stop reason: HOSPADM

## 2025-07-12 RX ORDER — FLUTICASONE PROPIONATE 50 MCG
1 SPRAY, SUSPENSION (ML) NASAL DAILY PRN
Status: DISCONTINUED | OUTPATIENT
Start: 2025-07-12 | End: 2025-07-17 | Stop reason: HOSPADM

## 2025-07-12 RX ORDER — ONDANSETRON 2 MG/ML
4 INJECTION INTRAMUSCULAR; INTRAVENOUS EVERY 6 HOURS PRN
Status: DISCONTINUED | OUTPATIENT
Start: 2025-07-12 | End: 2025-07-17 | Stop reason: HOSPADM

## 2025-07-12 RX ORDER — SODIUM CHLORIDE 0.9 % (FLUSH) 0.9 %
5-40 SYRINGE (ML) INJECTION EVERY 12 HOURS SCHEDULED
Status: DISCONTINUED | OUTPATIENT
Start: 2025-07-12 | End: 2025-07-17 | Stop reason: HOSPADM

## 2025-07-12 RX ORDER — SODIUM CHLORIDE 9 MG/ML
INJECTION, SOLUTION INTRAVENOUS PRN
Status: DISCONTINUED | OUTPATIENT
Start: 2025-07-12 | End: 2025-07-17 | Stop reason: HOSPADM

## 2025-07-12 RX ORDER — POTASSIUM CHLORIDE 7.45 MG/ML
10 INJECTION INTRAVENOUS PRN
Status: DISCONTINUED | OUTPATIENT
Start: 2025-07-12 | End: 2025-07-17 | Stop reason: HOSPADM

## 2025-07-12 RX ORDER — ALBUTEROL SULFATE 90 UG/1
2 INHALANT RESPIRATORY (INHALATION) EVERY 6 HOURS PRN
Status: DISCONTINUED | OUTPATIENT
Start: 2025-07-12 | End: 2025-07-17 | Stop reason: HOSPADM

## 2025-07-12 RX ORDER — DEXTROSE MONOHYDRATE 100 MG/ML
INJECTION, SOLUTION INTRAVENOUS CONTINUOUS PRN
Status: DISCONTINUED | OUTPATIENT
Start: 2025-07-12 | End: 2025-07-17 | Stop reason: HOSPADM

## 2025-07-12 RX ORDER — POLYETHYLENE GLYCOL 3350 17 G/17G
17 POWDER, FOR SOLUTION ORAL DAILY PRN
Status: DISCONTINUED | OUTPATIENT
Start: 2025-07-12 | End: 2025-07-17 | Stop reason: HOSPADM

## 2025-07-12 RX ADMIN — METOPROLOL SUCCINATE 50 MG: 50 TABLET, EXTENDED RELEASE ORAL at 09:18

## 2025-07-12 RX ADMIN — Medication 5 MG: at 21:21

## 2025-07-12 RX ADMIN — SPIRONOLACTONE 25 MG: 50 TABLET ORAL at 09:18

## 2025-07-12 RX ADMIN — ACETAMINOPHEN 650 MG: 325 TABLET ORAL at 09:18

## 2025-07-12 RX ADMIN — ENOXAPARIN SODIUM 30 MG: 100 INJECTION SUBCUTANEOUS at 09:21

## 2025-07-12 RX ADMIN — ENOXAPARIN SODIUM 30 MG: 100 INJECTION SUBCUTANEOUS at 21:21

## 2025-07-12 RX ADMIN — ATORVASTATIN CALCIUM 10 MG: 10 TABLET, FILM COATED ORAL at 09:18

## 2025-07-12 RX ADMIN — SODIUM CHLORIDE, PRESERVATIVE FREE 10 ML: 5 INJECTION INTRAVENOUS at 09:20

## 2025-07-12 RX ADMIN — ACETAMINOPHEN 650 MG: 325 TABLET ORAL at 21:21

## 2025-07-12 RX ADMIN — ESCITALOPRAM OXALATE 10 MG: 10 TABLET ORAL at 09:18

## 2025-07-12 RX ADMIN — LOSARTAN POTASSIUM 100 MG: 100 TABLET, FILM COATED ORAL at 09:17

## 2025-07-12 RX ADMIN — EMPAGLIFLOZIN 25 MG: 10 TABLET, FILM COATED ORAL at 09:18

## 2025-07-12 ASSESSMENT — PAIN SCALES - GENERAL: PAINLEVEL_OUTOF10: 4

## 2025-07-12 NOTE — ED NOTES
Pt able to ambulate a short distance in her room with assistance from staff. Pt uneasy about living by herself and being unsteady on her feet. Pt working with case management on possibly getting placement and assistance. Dr. Angelo made aware.

## 2025-07-12 NOTE — PLAN OF CARE
Problem: Chronic Conditions and Co-morbidities  Goal: Patient's chronic conditions and co-morbidity symptoms are monitored and maintained or improved  7/12/2025 1131 by Elizabeth Nunez, RN  Outcome: Progressing  7/12/2025 0421 by Gabino Travis, RN  Outcome: Progressing

## 2025-07-12 NOTE — ED TRIAGE NOTES
Pt to the ED via EMS after falling down approx 8-10 stairs. Pt states that she did hit her head, but no LOC. On EMS arrival, pt is c/o head and lower neck pain. No blood thinners. Pt arrives in a c-collar.

## 2025-07-12 NOTE — ED NOTES
ED TO INPATIENT SBAR HANDOFF    Patient Name: Cielo Howard   :  1950  75 y.o.   Preferred Name    Family/Caregiver Present no   Restraints no   C-SSRS: Risk of Suicide: No Risk  Sitter no   Sepsis Risk Score Sepsis V2 Risk Score: 3.9    PLEASE NOTE--Encounter / Re-Admission Within 30 Days  This patient has had another encounter or admission within the last 30 days.      No data recorded    Situation  Chief Complaint   Patient presents with    Fall     Pt BIB EMS for a fall. Per EMS pt fell down 7-8 stairs, endorses hitting her head. Not on thinners. Pt A x O times 4.      Brief Description of Patient's Condition: Pt came to the ED after falling down approx 8 stairs at her apartment. CT's were all negative, but pt is needing placement as she is not able to care for herself.She is able to ambulate short distances with assistance, and has been hooked up to a purewick while here.   Mental Status: oriented, alert, coherent, logical, and thought processes intact  Arrived from: home    Imaging:   CT THORACIC SPINE W CONTRAST   Final Result      CT LUMBAR SPINE W CONTRAST   Final Result      CT CHEST ABDOMEN PELVIS W CONTRAST Additional Contrast? None   Final Result      CT CERVICAL SPINE WO CONTRAST   Final Result      CT Head W/O Contrast   Final Result        Abnormal labs:   Abnormal Labs Reviewed   CBC WITH AUTO DIFFERENTIAL - Abnormal; Notable for the following components:       Result Value    MCHC 31.1 (*)     Monocytes % 7 (*)     Eosinophils % 4 (*)     All other components within normal limits   BASIC METABOLIC PANEL - Abnormal; Notable for the following components:    Sodium 133 (*)     CO2 18 (*)     Glucose 151 (*)     All other components within normal limits        Background  History:   Past Medical History:   Diagnosis Date    Anemia     Arthritis     Right Hip    Asthma     Cardiomyopathy, nonischemic (HCC)     hypertensive type    COPD (chronic obstructive pulmonary disease) ([facility])     per  Strong peripheral pulses

## 2025-07-12 NOTE — H&P
History and Physical      Name:  Cielo Howard /Age/Sex: 1950  (75 y.o. female)   MRN & CSN:  [id number] & [id number] Encounter Date/Time: 2025 1:30 AM EDT   Location:   PCP: Nicole Caraballo DO       Hospital Day: 2    Assessment and Plan:     Patient is a 75 y.o. female who presented with Fall (Pt BIB EMS for a fall. Per EMS pt fell down 7-8 stairs, endorses hitting her head. Not on thinners. Pt A x O times 4. )    Debility 2/2 Fall   - Inability to ambulate after fall. Trauma imagining non-acute. PT/OT eval for placement     Non-ischemic Cardiomyopathy   - Euvolemic on exam     Non-Obs CAD  -Continue aspirin, statin, beta-blocker    T2DM  - LCSI  - Hold PO meds   - Hypoglycemic protocol     HTN  -Continue home BP meds     HLD  -Continue statin       Mood Disorder   -Continue home meds    Asthma   -Continue home inhalers     Checklist:  Advanced directive: full  Diet: cardiac   DVT ppx: Lovenox   Sugar: BG goal of 140-180 while inpatient    Disposition: place in observation.  Estimated discharge: 2 day(s).  Current living situation: home.  Expected disposition: home vs IPR    Spoke with ED provider who recommended admission for the patient and I agree with that plan.  Personally reviewed lab studies and imaging.  EKG interpreted personally and results as stated above.  Imaging that was interpreted personally and results as stated above.    History of Present Illness:     Chief Complaint:    Chief Complaint   Patient presents with    Fall     Pt BIB EMS for a fall. Per EMS pt fell down 7-8 stairs, endorses hitting her head. Not on thinners. Pt A x O times 4.        Patient is a 75 y.o. female with a PMHx as above who presented to the ED with fall. Pt presents after a fall while walking down the stairs, falling and hitting her head, without LOC. Pt reports she lives alone and has difficulty with ambulation and seeking placement.  Denied any F/C, HA, dizziness, presyncope, syncope, cough,

## 2025-07-12 NOTE — CONSULTS
[facility] ACUTE CARE PHYSICAL THERAPY EVALUATION  Cielo Howard, 1950, [id number], 7/12/2025    History  [place]:  The primary encounter diagnosis was Fall, initial encounter. Diagnoses of Closed head injury, initial encounter and Unsteady gait were also pertinent to this visit.  Patient  has a past medical history of Anemia, Arthritis, Asthma, Cardiomyopathy, nonischemic (HCC), COPD (chronic obstructive pulmonary disease) (HCC), Edema, Essential hypertension, History of blood transfusion, Hyperlipidemia, Labial cyst, Major depressive disorder, single episode, unspecified, Mitral valve regurgitation, Obesity, Obstructive sleep apnea on CPAP, Osteopenia, PMB (postmenopausal bleeding), Reflux esophagitis, Type 2 diabetes mellitus without complication (HCC), and Vaginal discharge.  Patient  has a past surgical history that includes eye surgery (4 years old); Dilation and curettage of uterus; Cardiac catheterization; [place] tooth extraction (as a child); Colonoscopy; Colonoscopy (10/20/2014); Cataract removal; Dilation and curettage of uterus (N/A, 08/23/2022); Cardiac defibrillator placement (Left, 12/06/2022); Dilation and curettage of uterus (N/A, 04/18/2023); and Tonsillectomy.    Recommendation: Facility for moderate post-acute rehabilitation, anticipate 1-2 hours per day and 5 days per week.    Subjective:  Patient states: \"Yippidy doo dah!\".    Pain:  5/10 pain in back at rest.    Communication with other providers:  RN approval for therapy session, co-eval with ISAIAH Allen  Restrictions: general precautions, falls    Home Setup/Prior level of function  Social/Functional History  Lives With: Alone  Type of Home: Apartment  Home Layout: One level  Home Access: Stairs to enter with rails  Entrance Stairs - Number of Steps: 8  Bathroom Shower/Tub: Tub/Shower unit  Bathroom Equipment: Shower chair  Home Equipment: Cane - Quad, Walker - Standard  Has the patient had two or more falls in the past year

## 2025-07-12 NOTE — CARE COORDINATION
CM received a consult from Dr. Angelo to see if patient may have any needs if returning home.     CM into room to see patient. CM introduced self and explained job role. Patient lives in [facility] @ [address] [place] [address] and has to walk down 8 steps to get outside or to let someone in unless they have their own key. Patient has been asking the apartments to place her on the bottom floor for fear of falling.     Patient fell today down the 8 steps and is in a neck brace and stating that she is in some pain. CM covered up patients' legs with warm blankets. Patient reported that she has a couple of annuities and her social security and makes to much money for almost all assistance programs or reduced rent. Patient stated that she probably brings in about 2500.00 a month for one person. Patient also stated that she was concerned b/c the first floor of her apartment building has bed bugs and she thinks that some may have made their way upstairs and could possibly be in her couch.    Patient lives alone and her only brother lives out of state. She does have a couple of friends that can help with her, but they are older also and are in fear of climbing the steps. Normally patient does use a quad cane, but even while using her cane on the steps today; she still fell.     Patient used to work at [employer] for one year but had a difference of opinion with an aide there and no longer is employed with them. Patient did talk about possibly going to rehab at a facility but said that she is not interested in going to [employer] [place] but may be interested into going to [facility].     Patient does have a RN through a Home Health Care Agency but the RN is unable to go up the steps to her apartment also, so she has the patient take her own vitals and then call her on the phone and give them to the RN.    Patient also interested in [facility] as a [facility] agency b/c even her RN suggested that

## 2025-07-12 NOTE — ED PROVIDER NOTES
neck and torso is all negative.  Patient is failing ambulation trial here and does require climbing an entire flight of steps to enter her apartment and is not suitable for home-going.  Case management is consulted and they are recommending admission for placement and possible inpatient rehab which patient is amenable to.  Preadmission labs are ordered and is overall nonacute.  The hospitalist is consulted for the admission.        History from : Patient    Limitations to history : None    Patient was given the following medications:  Medications   fentaNYL (SUBLIMAZE) injection 25 mcg (25 mcg IntraVENous Given 7/11/25 2230)   ondansetron (ZOFRAN) injection 4 mg (4 mg IntraVENous Given 7/11/25 2229)   iopamidol (ISOVUE-370) 76 % injection 75 mL (75 mLs IntraVENous Given 7/11/25 2153)       Independent Imaging Interpretation by me: CT head without ICH per my read    EKG (if obtained): (All EKG's are interpreted by myself in the absence of a cardiologist) NA    Chronic conditions affecting care: obesity, OA    Discussion with Other Profesionals : Admitting Team (hospitalist) and Case Management      Social Determinants : Lives in apartment that requires climbing an entire flight of steps for entry.    Records Reviewed : None    Disposition Considerations (tests considered but not done, Shared Decision Making, Pt Expectation of Test or Tx.):   Patient admitted.  Questions sought and answered with the patient. They voice understanding and agree with plan.    I am the Primary Clinician of Record.          Clinical Impression:  1. Fall, initial encounter    2. Closed head injury, initial encounter    3. Unsteady gait      Disposition referral (if applicable):  No follow-up provider specified.  Disposition medications (if applicable):  New Prescriptions    No medications on file       Comment: Please note this report has been produced using speech recognition software and may contain errors related to that system including

## 2025-07-13 LAB
GLUCOSE BLD-MCNC: 121 MG/DL (ref 74–99)
GLUCOSE BLD-MCNC: 137 MG/DL (ref 74–99)
GLUCOSE BLD-MCNC: 161 MG/DL (ref 74–99)
GLUCOSE BLD-MCNC: 167 MG/DL (ref 74–99)

## 2025-07-13 PROCEDURE — 97530 THERAPEUTIC ACTIVITIES: CPT

## 2025-07-13 PROCEDURE — 6360000002 HC RX W HCPCS: Performed by: STUDENT IN AN ORGANIZED HEALTH CARE EDUCATION/TRAINING PROGRAM

## 2025-07-13 PROCEDURE — 6370000000 HC RX 637 (ALT 250 FOR IP): Performed by: LICENSED PRACTICAL NURSE

## 2025-07-13 PROCEDURE — 6370000000 HC RX 637 (ALT 250 FOR IP): Performed by: PHYSICIAN ASSISTANT

## 2025-07-13 PROCEDURE — 82962 GLUCOSE BLOOD TEST: CPT

## 2025-07-13 PROCEDURE — 1200000000 HC SEMI PRIVATE

## 2025-07-13 PROCEDURE — G0378 HOSPITAL OBSERVATION PER HR: HCPCS

## 2025-07-13 PROCEDURE — 94761 N-INVAS EAR/PLS OXIMETRY MLT: CPT

## 2025-07-13 PROCEDURE — 97535 SELF CARE MNGMENT TRAINING: CPT

## 2025-07-13 PROCEDURE — 6370000000 HC RX 637 (ALT 250 FOR IP): Performed by: STUDENT IN AN ORGANIZED HEALTH CARE EDUCATION/TRAINING PROGRAM

## 2025-07-13 RX ORDER — METHOCARBAMOL 500 MG/1
750 TABLET, FILM COATED ORAL ONCE
Status: COMPLETED | OUTPATIENT
Start: 2025-07-13 | End: 2025-07-13

## 2025-07-13 RX ADMIN — ESCITALOPRAM OXALATE 10 MG: 10 TABLET ORAL at 08:55

## 2025-07-13 RX ADMIN — SPIRONOLACTONE 25 MG: 50 TABLET ORAL at 08:53

## 2025-07-13 RX ADMIN — ACETAMINOPHEN 650 MG: 325 TABLET ORAL at 09:06

## 2025-07-13 RX ADMIN — Medication 5 MG: at 21:14

## 2025-07-13 RX ADMIN — LOSARTAN POTASSIUM 100 MG: 100 TABLET, FILM COATED ORAL at 08:54

## 2025-07-13 RX ADMIN — ENOXAPARIN SODIUM 30 MG: 100 INJECTION SUBCUTANEOUS at 21:15

## 2025-07-13 RX ADMIN — ENOXAPARIN SODIUM 30 MG: 100 INJECTION SUBCUTANEOUS at 08:53

## 2025-07-13 RX ADMIN — METOPROLOL SUCCINATE 50 MG: 50 TABLET, EXTENDED RELEASE ORAL at 08:55

## 2025-07-13 RX ADMIN — METHOCARBAMOL 750 MG: 500 TABLET ORAL at 09:57

## 2025-07-13 RX ADMIN — ACETAMINOPHEN 650 MG: 325 TABLET ORAL at 21:14

## 2025-07-13 RX ADMIN — POLYETHYLENE GLYCOL (3350) 17 G: 17 POWDER, FOR SOLUTION ORAL at 16:56

## 2025-07-13 RX ADMIN — ATORVASTATIN CALCIUM 10 MG: 10 TABLET, FILM COATED ORAL at 08:54

## 2025-07-13 RX ADMIN — EMPAGLIFLOZIN 25 MG: 10 TABLET, FILM COATED ORAL at 08:55

## 2025-07-13 ASSESSMENT — PAIN DESCRIPTION - DESCRIPTORS
DESCRIPTORS: SORE
DESCRIPTORS: DULL

## 2025-07-13 ASSESSMENT — PAIN SCALES - GENERAL
PAINLEVEL_OUTOF10: 5
PAINLEVEL_OUTOF10: 2
PAINLEVEL_OUTOF10: 1
PAINLEVEL_OUTOF10: 0

## 2025-07-13 ASSESSMENT — PAIN - FUNCTIONAL ASSESSMENT: PAIN_FUNCTIONAL_ASSESSMENT: ACTIVITIES ARE NOT PREVENTED

## 2025-07-13 ASSESSMENT — PAIN DESCRIPTION - ORIENTATION: ORIENTATION: MID

## 2025-07-13 ASSESSMENT — PAIN DESCRIPTION - LOCATION
LOCATION: BUTTOCKS
LOCATION: BACK;FACE

## 2025-07-13 NOTE — PLAN OF CARE
Problem: Chronic Conditions and Co-morbidities  Goal: Patient's chronic conditions and co-morbidity symptoms are monitored and maintained or improved  Outcome: Progressing  Flowsheets  Taken 7/13/2025 0633  Care Plan - Patient's Chronic Conditions and Co-Morbidity Symptoms are Monitored and Maintained or Improved:   Update acute care plan with appropriate goals if chronic or comorbid symptoms are exacerbated and prevent overall improvement and discharge   Monitor and assess patient's chronic conditions and comorbid symptoms for stability, deterioration, or improvement  Taken 7/12/2025 2100  Care Plan - Patient's Chronic Conditions and Co-Morbidity Symptoms are Monitored and Maintained or Improved:   Monitor and assess patient's chronic conditions and comorbid symptoms for stability, deterioration, or improvement   Collaborate with multidisciplinary team to address chronic and comorbid conditions and prevent exacerbation or deterioration     Problem: Pain  Goal: Verbalizes/displays adequate comfort level or baseline comfort level  Outcome: Progressing  Flowsheets  Taken 7/13/2025 0633  Verbalizes/displays adequate comfort level or baseline comfort level:   Notify Licensed Independent Practitioner if interventions unsuccessful or patient reports new pain   Implement non-pharmacological measures as appropriate and evaluate response  Taken 7/12/2025 1947  Verbalizes/displays adequate comfort level or baseline comfort level:   Administer analgesics based on type and severity of pain and evaluate response   Encourage patient to monitor pain and request assistance     Problem: Safety - Adult  Goal: Free from fall injury  Outcome: Progressing  Flowsheets (Taken 7/13/2025 0633)  Free From Fall Injury: Instruct family/caregiver on patient safety

## 2025-07-13 NOTE — PLAN OF CARE
Problem: Chronic Conditions and Co-morbidities  Goal: Patient's chronic conditions and co-morbidity symptoms are monitored and maintained or improved  7/13/2025 1023 by Mynor Caldwell RN  Outcome: Progressing  7/13/2025 0633 by Lavelle Franklin RN  Outcome: Progressing  Flowsheets  Taken 7/13/2025 0633  Care Plan - Patient's Chronic Conditions and Co-Morbidity Symptoms are Monitored and Maintained or Improved:   Update acute care plan with appropriate goals if chronic or comorbid symptoms are exacerbated and prevent overall improvement and discharge   Monitor and assess patient's chronic conditions and comorbid symptoms for stability, deterioration, or improvement  Taken 7/12/2025 2100  Care Plan - Patient's Chronic Conditions and Co-Morbidity Symptoms are Monitored and Maintained or Improved:   Monitor and assess patient's chronic conditions and comorbid symptoms for stability, deterioration, or improvement   Collaborate with multidisciplinary team to address chronic and comorbid conditions and prevent exacerbation or deterioration     Problem: Discharge Planning  Goal: Discharge to home or other facility with appropriate resources  Outcome: Progressing  Flowsheets (Taken 7/12/2025 2100 by Lavelle Franklin, RN)  Discharge to home or other facility with appropriate resources:   Identify barriers to discharge with patient and caregiver   Identify discharge learning needs (meds, wound care, etc)   Refer to discharge planning if patient needs post-hospital services based on physician order or complex needs related to functional status, cognitive ability or social support system   Arrange for interpreters to assist at discharge as needed   Arrange for needed discharge resources and transportation as appropriate     Problem: Pain  Goal: Verbalizes/displays adequate comfort level or baseline comfort level  7/13/2025 1023 by Mynor Caldwell RN  Outcome: Progressing  7/13/2025 0633 by Lavelle Franklin RN  Outcome:

## 2025-07-13 NOTE — CARE COORDINATION
Pt lives in an apt alone.  Prt uses a quad cane at home and also has a walker.  Pt was independent prior to falling.  Pt has a PCP and has insurance to help with her healthcare cost.  LSW spoke with pt regarding PT/OT recs are for SNF.  SNF list given.  I read that pt told the ED CM of wanting to go to [facility].  Unfortunately [facility] does not take her [employer] insurance.  Pt looked over the list and requested 1. [facility], [facility]. [facility] and 3. [facility].  LSW sent referral to all 3 SNFs thru [facility].  Pt will need precert.         07/13/25 0952   Service Assessment   Patient Orientation Alert and Oriented   Cognition Alert   History Provided By Patient   Primary Caregiver Self   Support Systems Family Members   Patient's Healthcare Decision Maker is: Legal Next of Kin   PCP Verified by CM Yes   Last Visit to PCP Within last 3 months   Prior Functional Level Independent in ADLs/IADLs   Current Functional Level Assistance with the following:   Can patient return to prior living arrangement Unknown at present   Ability to make needs known: Good   Family able to assist with home care needs: No   Would you like for me to discuss the discharge plan with any other family members/significant others, and if so, who? No   Social/Functional History   Lives With Alone   Type of Home Apartment   Home Layout One level   Bathroom Shower/Tub Tub/Shower unit   Bathroom Equipment Shower chair   Home Equipment Cane - Quad;Walker - Standard   Discharge Planning   Type of Residence Apartment   Living Arrangements Alone   Patient expects to be discharged to: Apartment   Services At/After Discharge   Transition of Care Consult (CM Consult) SNF   Condition of Participation: Discharge Planning   The Patient and/or Patient Representative was provided with a Choice of Provider? Patient   The Patient and/Or Patient Representative agree with the Discharge Plan? Yes   Freedom of Choice list was provided with basic dialogue that supports the patient's

## 2025-07-14 LAB
ALBUMIN SERPL-MCNC: 3.7 G/DL (ref 3.4–5)
ALBUMIN/GLOB SERPL: 1.6 {RATIO} (ref 1.1–2.2)
ALP SERPL-CCNC: 93 U/L (ref 40–129)
ALT SERPL-CCNC: 18 U/L (ref 10–40)
ANION GAP SERPL CALCULATED.3IONS-SCNC: 12 MMOL/L (ref 9–17)
AST SERPL-CCNC: 14 U/L (ref 15–37)
BASOPHILS # BLD: 0.04 K/UL
BASOPHILS NFR BLD: 1 % (ref 0–1)
BILIRUB SERPL-MCNC: 0.3 MG/DL (ref 0–1)
BUN SERPL-MCNC: 13 MG/DL (ref 7–20)
CALCIUM SERPL-MCNC: 9.5 MG/DL (ref 8.3–10.6)
CHLORIDE SERPL-SCNC: 101 MMOL/L (ref 99–110)
CO2 SERPL-SCNC: 24 MMOL/L (ref 21–32)
CREAT SERPL-MCNC: 0.7 MG/DL (ref 0.6–1.2)
EOSINOPHIL # BLD: 0.21 K/UL
EOSINOPHILS RELATIVE PERCENT: 3 % (ref 0–3)
ERYTHROCYTE [DISTWIDTH] IN BLOOD BY AUTOMATED COUNT: 13.7 % (ref 11.7–14.9)
GFR, ESTIMATED: 82 ML/MIN/1.73M2
GLUCOSE BLD-MCNC: 122 MG/DL (ref 74–99)
GLUCOSE BLD-MCNC: 129 MG/DL (ref 74–99)
GLUCOSE BLD-MCNC: 137 MG/DL (ref 74–99)
GLUCOSE BLD-MCNC: 151 MG/DL (ref 74–99)
GLUCOSE SERPL-MCNC: 159 MG/DL (ref 74–99)
HCT VFR BLD AUTO: 37.2 % (ref 37–47)
HGB BLD-MCNC: 11.5 G/DL (ref 12.5–16)
IMM GRANULOCYTES # BLD AUTO: 0.02 K/UL
IMM GRANULOCYTES NFR BLD: 0 %
LYMPHOCYTES NFR BLD: 1.66 K/UL
LYMPHOCYTES RELATIVE PERCENT: 27 % (ref 24–44)
MCH RBC QN AUTO: 28.2 PG (ref 27–31)
MCHC RBC AUTO-ENTMCNC: 30.9 G/DL (ref 32–36)
MCV RBC AUTO: 91.2 FL (ref 78–100)
MONOCYTES NFR BLD: 0.52 K/UL
MONOCYTES NFR BLD: 9 % (ref 0–5)
NEUTROPHILS NFR BLD: 60 % (ref 36–66)
NEUTS SEG NFR BLD: 3.67 K/UL
PLATELET # BLD AUTO: 215 K/UL (ref 140–440)
PMV BLD AUTO: 9.3 FL (ref 7.5–11.1)
POTASSIUM SERPL-SCNC: 4.1 MMOL/L (ref 3.5–5.1)
PROT SERPL-MCNC: 6.1 G/DL (ref 6.4–8.2)
RBC # BLD AUTO: 4.08 M/UL (ref 4.2–5.4)
SODIUM SERPL-SCNC: 137 MMOL/L (ref 136–145)
WBC OTHER # BLD: 6.1 K/UL (ref 4–10.5)

## 2025-07-14 PROCEDURE — 94761 N-INVAS EAR/PLS OXIMETRY MLT: CPT

## 2025-07-14 PROCEDURE — 80053 COMPREHEN METABOLIC PANEL: CPT

## 2025-07-14 PROCEDURE — 6370000000 HC RX 637 (ALT 250 FOR IP): Performed by: STUDENT IN AN ORGANIZED HEALTH CARE EDUCATION/TRAINING PROGRAM

## 2025-07-14 PROCEDURE — 6360000002 HC RX W HCPCS: Performed by: STUDENT IN AN ORGANIZED HEALTH CARE EDUCATION/TRAINING PROGRAM

## 2025-07-14 PROCEDURE — 97116 GAIT TRAINING THERAPY: CPT

## 2025-07-14 PROCEDURE — 83036 HEMOGLOBIN GLYCOSYLATED A1C: CPT

## 2025-07-14 PROCEDURE — 1200000000 HC SEMI PRIVATE

## 2025-07-14 PROCEDURE — 6370000000 HC RX 637 (ALT 250 FOR IP): Performed by: LICENSED PRACTICAL NURSE

## 2025-07-14 PROCEDURE — 85025 COMPLETE CBC W/AUTO DIFF WBC: CPT

## 2025-07-14 PROCEDURE — 97110 THERAPEUTIC EXERCISES: CPT

## 2025-07-14 PROCEDURE — 82962 GLUCOSE BLOOD TEST: CPT

## 2025-07-14 PROCEDURE — 36415 COLL VENOUS BLD VENIPUNCTURE: CPT

## 2025-07-14 RX ORDER — FUROSEMIDE 20 MG/1
20 TABLET ORAL ONCE
Status: COMPLETED | OUTPATIENT
Start: 2025-07-14 | End: 2025-07-14

## 2025-07-14 RX ORDER — SENNOSIDES 8.6 MG/1
1 TABLET ORAL NIGHTLY
Status: DISCONTINUED | OUTPATIENT
Start: 2025-07-14 | End: 2025-07-17 | Stop reason: HOSPADM

## 2025-07-14 RX ORDER — BISACODYL 10 MG
10 SUPPOSITORY, RECTAL RECTAL DAILY PRN
Status: DISCONTINUED | OUTPATIENT
Start: 2025-07-14 | End: 2025-07-17 | Stop reason: HOSPADM

## 2025-07-14 RX ORDER — METHOCARBAMOL 500 MG/1
750 TABLET, FILM COATED ORAL 2 TIMES DAILY PRN
Status: COMPLETED | OUTPATIENT
Start: 2025-07-14 | End: 2025-07-17

## 2025-07-14 RX ADMIN — ESCITALOPRAM OXALATE 10 MG: 10 TABLET ORAL at 09:43

## 2025-07-14 RX ADMIN — SENNOSIDES 8.6 MG: 8.6 TABLET, FILM COATED ORAL at 21:46

## 2025-07-14 RX ADMIN — METHOCARBAMOL 750 MG: 500 TABLET ORAL at 16:25

## 2025-07-14 RX ADMIN — SPIRONOLACTONE 25 MG: 50 TABLET ORAL at 09:41

## 2025-07-14 RX ADMIN — ENOXAPARIN SODIUM 30 MG: 100 INJECTION SUBCUTANEOUS at 21:46

## 2025-07-14 RX ADMIN — ENOXAPARIN SODIUM 30 MG: 100 INJECTION SUBCUTANEOUS at 09:41

## 2025-07-14 RX ADMIN — ACETAMINOPHEN 650 MG: 325 TABLET ORAL at 04:35

## 2025-07-14 RX ADMIN — EMPAGLIFLOZIN 25 MG: 10 TABLET, FILM COATED ORAL at 09:42

## 2025-07-14 RX ADMIN — ATORVASTATIN CALCIUM 10 MG: 10 TABLET, FILM COATED ORAL at 09:42

## 2025-07-14 RX ADMIN — BISACODYL 10 MG: 10 SUPPOSITORY RECTAL at 16:24

## 2025-07-14 RX ADMIN — FUROSEMIDE 20 MG: 20 TABLET ORAL at 12:17

## 2025-07-14 RX ADMIN — METOPROLOL SUCCINATE 50 MG: 50 TABLET, EXTENDED RELEASE ORAL at 09:42

## 2025-07-14 RX ADMIN — ACETAMINOPHEN 650 MG: 325 TABLET ORAL at 16:24

## 2025-07-14 RX ADMIN — POLYETHYLENE GLYCOL (3350) 17 G: 17 POWDER, FOR SOLUTION ORAL at 09:44

## 2025-07-14 RX ADMIN — LOSARTAN POTASSIUM 100 MG: 100 TABLET, FILM COATED ORAL at 09:43

## 2025-07-14 ASSESSMENT — PAIN DESCRIPTION - LOCATION: LOCATION: HEAD

## 2025-07-14 ASSESSMENT — PAIN SCALES - GENERAL
PAINLEVEL_OUTOF10: 5
PAINLEVEL_OUTOF10: 0
PAINLEVEL_OUTOF10: 4
PAINLEVEL_OUTOF10: 4
PAINLEVEL_OUTOF10: 3

## 2025-07-14 ASSESSMENT — PAIN DESCRIPTION - DESCRIPTORS: DESCRIPTORS: ACHING

## 2025-07-14 NOTE — PLAN OF CARE
Problem: Pain  Goal: Verbalizes/displays adequate comfort level or baseline comfort level  7/14/2025 1403 by Susie Arredondo RN  Outcome: Progressing    Problem: ABCDS Injury Assessment  Goal: Absence of physical injury  7/14/2025 1403 by Susie Arredondo RN  Outcome: Progressing     Problem: Safety - Adult  Goal: Free from fall injury  7/14/2025 1403 by Susie Arredondo, RN  Outcome: Progressing

## 2025-07-14 NOTE — CARE COORDINATION
Chart reviewed. Cm notified that [facility] is able to accept pt. Cm sent PS to Dr. York informing him of this. Dr. York requested that precert be started today.     Cm sent message to Brielle RAY  manager and asked for precert to be started.     Cm in to see pt at bedside, introduced self and role of case management. Cm informed pt that [facility] accepted pt and that precert is pending. Pt verbalized understanding of above. No other questions or needs stated at this time. Cm to follow.     Discharging RN: Pt is going to [facility] at discharge. If pt is discharged after hours or over the weekend, please complete the following. Please set up transportation for pt via [place] at [phone]. Please call [phone] for report. Please fax face sheet, H&P, and AVS with completed SEA to [phone]. Thanks! --MB

## 2025-07-15 LAB
GLUCOSE BLD-MCNC: 132 MG/DL (ref 74–99)
GLUCOSE BLD-MCNC: 140 MG/DL (ref 74–99)
GLUCOSE BLD-MCNC: 142 MG/DL (ref 74–99)
GLUCOSE BLD-MCNC: 148 MG/DL (ref 74–99)

## 2025-07-15 PROCEDURE — 94761 N-INVAS EAR/PLS OXIMETRY MLT: CPT

## 2025-07-15 PROCEDURE — 1200000000 HC SEMI PRIVATE

## 2025-07-15 PROCEDURE — 94660 CPAP INITIATION&MGMT: CPT

## 2025-07-15 PROCEDURE — 6370000000 HC RX 637 (ALT 250 FOR IP): Performed by: LICENSED PRACTICAL NURSE

## 2025-07-15 PROCEDURE — 6370000000 HC RX 637 (ALT 250 FOR IP): Performed by: STUDENT IN AN ORGANIZED HEALTH CARE EDUCATION/TRAINING PROGRAM

## 2025-07-15 PROCEDURE — 97530 THERAPEUTIC ACTIVITIES: CPT

## 2025-07-15 PROCEDURE — 6360000002 HC RX W HCPCS: Performed by: STUDENT IN AN ORGANIZED HEALTH CARE EDUCATION/TRAINING PROGRAM

## 2025-07-15 PROCEDURE — 82962 GLUCOSE BLOOD TEST: CPT

## 2025-07-15 RX ADMIN — EMPAGLIFLOZIN 25 MG: 10 TABLET, FILM COATED ORAL at 09:42

## 2025-07-15 RX ADMIN — ENOXAPARIN SODIUM 30 MG: 100 INJECTION SUBCUTANEOUS at 20:17

## 2025-07-15 RX ADMIN — SENNOSIDES 8.6 MG: 8.6 TABLET, FILM COATED ORAL at 20:17

## 2025-07-15 RX ADMIN — ESCITALOPRAM OXALATE 10 MG: 10 TABLET ORAL at 09:43

## 2025-07-15 RX ADMIN — ENOXAPARIN SODIUM 30 MG: 100 INJECTION SUBCUTANEOUS at 09:41

## 2025-07-15 RX ADMIN — METOPROLOL SUCCINATE 50 MG: 50 TABLET, EXTENDED RELEASE ORAL at 09:41

## 2025-07-15 RX ADMIN — SPIRONOLACTONE 25 MG: 50 TABLET ORAL at 09:43

## 2025-07-15 RX ADMIN — METHOCARBAMOL 750 MG: 500 TABLET ORAL at 09:48

## 2025-07-15 RX ADMIN — ATORVASTATIN CALCIUM 10 MG: 10 TABLET, FILM COATED ORAL at 09:43

## 2025-07-15 RX ADMIN — ACETAMINOPHEN 650 MG: 325 TABLET ORAL at 16:34

## 2025-07-15 RX ADMIN — LOSARTAN POTASSIUM 100 MG: 100 TABLET, FILM COATED ORAL at 09:42

## 2025-07-15 ASSESSMENT — PAIN SCALES - GENERAL
PAINLEVEL_OUTOF10: 0
PAINLEVEL_OUTOF10: 4
PAINLEVEL_OUTOF10: 3
PAINLEVEL_OUTOF10: 2
PAINLEVEL_OUTOF10: 3
PAINLEVEL_OUTOF10: 0
PAINLEVEL_OUTOF10: 3

## 2025-07-15 ASSESSMENT — PAIN DESCRIPTION - LOCATION
LOCATION: COCCYX
LOCATION: BACK
LOCATION: COCCYX

## 2025-07-15 ASSESSMENT — PAIN DESCRIPTION - ORIENTATION
ORIENTATION: UPPER
ORIENTATION: UPPER

## 2025-07-15 ASSESSMENT — PAIN DESCRIPTION - DESCRIPTORS
DESCRIPTORS: DISCOMFORT
DESCRIPTORS: ACHING

## 2025-07-15 ASSESSMENT — PAIN - FUNCTIONAL ASSESSMENT: PAIN_FUNCTIONAL_ASSESSMENT: ACTIVITIES ARE NOT PREVENTED

## 2025-07-15 NOTE — PLAN OF CARE
Problem: Chronic Conditions and Co-morbidities  Goal: Patient's chronic conditions and co-morbidity symptoms are monitored and maintained or improved  Outcome: Progressing     Problem: Safety - Adult  Goal: Free from fall injury  7/15/2025 0023 by Gabino Travis RN  Outcome: Progressing     Problem: Discharge Planning  Goal: Discharge to home or other facility with appropriate resources  Outcome: Progressing

## 2025-07-15 NOTE — PLAN OF CARE
Problem: Chronic Conditions and Co-morbidities  Goal: Patient's chronic conditions and co-morbidity symptoms are monitored and maintained or improved  7/15/2025 1209 by Susie Arredondo RN  Outcome: Progressing     Problem: Discharge Planning  Goal: Discharge to home or other facility with appropriate resources  7/15/2025 1209 by Susie Arredondo RN  Outcome: Progressing     Problem: Pain  Goal: Verbalizes/displays adequate comfort level or baseline comfort level  7/15/2025 1209 by Susie Arredondo RN  Outcome: Progressing    Problem: ABCDS Injury Assessment  Goal: Absence of physical injury  7/15/2025 1209 by Susie Arredondo RN  Outcome: Progressing    Problem: Safety - Adult  Goal: Free from fall injury  7/15/2025 1209 by Susie Arredondo RN  Outcome: Progressing

## 2025-07-16 LAB
EST. AVERAGE GLUCOSE BLD GHB EST-MCNC: 135 MG/DL
HBA1C MFR BLD: 6.3 % (ref 4.2–6.3)

## 2025-07-16 PROCEDURE — 97116 GAIT TRAINING THERAPY: CPT

## 2025-07-16 PROCEDURE — 97110 THERAPEUTIC EXERCISES: CPT

## 2025-07-16 PROCEDURE — 94761 N-INVAS EAR/PLS OXIMETRY MLT: CPT

## 2025-07-16 PROCEDURE — 1200000000 HC SEMI PRIVATE

## 2025-07-16 PROCEDURE — 6370000000 HC RX 637 (ALT 250 FOR IP): Performed by: LICENSED PRACTICAL NURSE

## 2025-07-16 PROCEDURE — 6370000000 HC RX 637 (ALT 250 FOR IP): Performed by: STUDENT IN AN ORGANIZED HEALTH CARE EDUCATION/TRAINING PROGRAM

## 2025-07-16 PROCEDURE — APPNB15 APP NON BILLABLE TIME 0-15 MINS

## 2025-07-16 RX ADMIN — METOPROLOL SUCCINATE 50 MG: 50 TABLET, EXTENDED RELEASE ORAL at 08:50

## 2025-07-16 RX ADMIN — ATORVASTATIN CALCIUM 10 MG: 10 TABLET, FILM COATED ORAL at 08:50

## 2025-07-16 RX ADMIN — LOSARTAN POTASSIUM 100 MG: 100 TABLET, FILM COATED ORAL at 08:50

## 2025-07-16 RX ADMIN — ACETAMINOPHEN 650 MG: 325 TABLET ORAL at 10:55

## 2025-07-16 RX ADMIN — ESCITALOPRAM OXALATE 10 MG: 10 TABLET ORAL at 08:51

## 2025-07-16 RX ADMIN — METHOCARBAMOL 750 MG: 500 TABLET ORAL at 10:55

## 2025-07-16 RX ADMIN — SENNOSIDES 8.6 MG: 8.6 TABLET, FILM COATED ORAL at 22:51

## 2025-07-16 RX ADMIN — EMPAGLIFLOZIN 25 MG: 10 TABLET, FILM COATED ORAL at 08:51

## 2025-07-16 RX ADMIN — SPIRONOLACTONE 25 MG: 50 TABLET ORAL at 08:50

## 2025-07-16 RX ADMIN — POLYETHYLENE GLYCOL (3350) 17 G: 17 POWDER, FOR SOLUTION ORAL at 13:49

## 2025-07-16 ASSESSMENT — PAIN - FUNCTIONAL ASSESSMENT: PAIN_FUNCTIONAL_ASSESSMENT: ACTIVITIES ARE NOT PREVENTED

## 2025-07-16 ASSESSMENT — PAIN DESCRIPTION - DESCRIPTORS: DESCRIPTORS: ACHING

## 2025-07-16 ASSESSMENT — PAIN DESCRIPTION - ORIENTATION: ORIENTATION: UPPER;RIGHT;LEFT

## 2025-07-16 ASSESSMENT — PAIN SCALES - GENERAL
PAINLEVEL_OUTOF10: 2
PAINLEVEL_OUTOF10: 4

## 2025-07-16 ASSESSMENT — PAIN DESCRIPTION - LOCATION: LOCATION: BACK;SHOULDER

## 2025-07-16 NOTE — CARE COORDINATION
PRecert remains pending at this time per [employer] portal, uploaded additional PT/OT and progress notes

## 2025-07-16 NOTE — CARE COORDINATION
Pt was denied to go to [facility].  Peer to peer was offered.  PS sent to the doctor.      The doctor will need to call [phone] by 0800 on      You may have the follow for the P2P    Name:  Cielo NYDIA Howard  :  1948  Pending auth ID [id number]   Member ID # [id number]

## 2025-07-16 NOTE — PLAN OF CARE
Problem: Pain  Goal: Verbalizes/displays adequate comfort level or baseline comfort level  7/15/2025 2245 by Gabino Travis, RN  Outcome: Progressing     Problem: Safety - Adult  Goal: Free from fall injury  7/15/2025 2245 by Gabino Travis, RN  Outcome: Progressing     Problem: Discharge Planning  Goal: Discharge to home or other facility with appropriate resources  7/15/2025 2245 by Gabino Travis, RN  Outcome: Progressing

## 2025-07-16 NOTE — CARE COORDINATION
Received call Barry stating that a P2P is being offered. Provider to call [phone] by 0800 on 7/17.

## 2025-07-16 NOTE — PLAN OF CARE
Problem: Chronic Conditions and Co-morbidities  Goal: Patient's chronic conditions and co-morbidity symptoms are monitored and maintained or improved  7/16/2025 1114 by Pastor Feliciano RN  Outcome: Progressing  7/15/2025 2245 by Gabino Travis RN  Outcome: Progressing     Problem: Discharge Planning  Goal: Discharge to home or other facility with appropriate resources  7/16/2025 1114 by Pastor Feliciano RN  Outcome: Progressing  7/15/2025 2245 by Gabino Travis RN  Outcome: Progressing     Problem: Pain  Goal: Verbalizes/displays adequate comfort level or baseline comfort level  7/16/2025 1114 by Pastor Feliciano RN  Outcome: Progressing  7/15/2025 2245 by Gabino Travis RN  Outcome: Progressing     Problem: ABCDS Injury Assessment  Goal: Absence of physical injury  7/16/2025 1114 by Pastor Feliciano RN  Outcome: Progressing  7/15/2025 2245 by Gabino Travis RN  Outcome: Progressing     Problem: Safety - Adult  Goal: Free from fall injury  7/16/2025 1114 by Pastor Feliciano RN  Outcome: Progressing  7/15/2025 2245 by Gabino Travis RN  Outcome: Progressing

## 2025-07-17 VITALS
OXYGEN SATURATION: 92 % | HEART RATE: 71 BPM | TEMPERATURE: 97.5 F | SYSTOLIC BLOOD PRESSURE: 123 MMHG | RESPIRATION RATE: 16 BRPM | HEIGHT: 60 IN | DIASTOLIC BLOOD PRESSURE: 70 MMHG | BODY MASS INDEX: 48.13 KG/M2 | WEIGHT: 245.15 LBS

## 2025-07-17 PROCEDURE — 97535 SELF CARE MNGMENT TRAINING: CPT

## 2025-07-17 PROCEDURE — 94660 CPAP INITIATION&MGMT: CPT

## 2025-07-17 PROCEDURE — 6370000000 HC RX 637 (ALT 250 FOR IP): Performed by: LICENSED PRACTICAL NURSE

## 2025-07-17 PROCEDURE — 94761 N-INVAS EAR/PLS OXIMETRY MLT: CPT

## 2025-07-17 PROCEDURE — 97530 THERAPEUTIC ACTIVITIES: CPT

## 2025-07-17 PROCEDURE — 6370000000 HC RX 637 (ALT 250 FOR IP): Performed by: STUDENT IN AN ORGANIZED HEALTH CARE EDUCATION/TRAINING PROGRAM

## 2025-07-17 RX ORDER — ALBUTEROL SULFATE 90 UG/1
2 INHALANT RESPIRATORY (INHALATION) EVERY 6 HOURS PRN
Qty: 54 G | Refills: 3 | Status: SHIPPED | OUTPATIENT
Start: 2025-07-17

## 2025-07-17 RX ADMIN — ACETAMINOPHEN 650 MG: 325 TABLET ORAL at 08:13

## 2025-07-17 RX ADMIN — ATORVASTATIN CALCIUM 10 MG: 10 TABLET, FILM COATED ORAL at 08:12

## 2025-07-17 RX ADMIN — METOPROLOL SUCCINATE 50 MG: 50 TABLET, EXTENDED RELEASE ORAL at 08:12

## 2025-07-17 RX ADMIN — LOSARTAN POTASSIUM 100 MG: 100 TABLET, FILM COATED ORAL at 08:12

## 2025-07-17 RX ADMIN — EMPAGLIFLOZIN 25 MG: 10 TABLET, FILM COATED ORAL at 08:12

## 2025-07-17 RX ADMIN — METHOCARBAMOL 750 MG: 500 TABLET ORAL at 08:13

## 2025-07-17 RX ADMIN — ESCITALOPRAM OXALATE 10 MG: 10 TABLET ORAL at 08:12

## 2025-07-17 RX ADMIN — SPIRONOLACTONE 25 MG: 50 TABLET ORAL at 08:13

## 2025-07-17 ASSESSMENT — PAIN DESCRIPTION - DESCRIPTORS: DESCRIPTORS: ACHING

## 2025-07-17 ASSESSMENT — PAIN DESCRIPTION - ORIENTATION: ORIENTATION: MID;UPPER

## 2025-07-17 ASSESSMENT — PAIN SCALES - GENERAL: PAINLEVEL_OUTOF10: 4

## 2025-07-17 ASSESSMENT — PAIN DESCRIPTION - LOCATION: LOCATION: BACK

## 2025-07-17 NOTE — PROGRESS NOTES
V2.0    [facility] [place] Progress Note      Name:  Cielo Howard /Age/Sex: 1950  (75 y.o. female)   MRN & CSN:  [id number] & [id number] Encounter Date/Time: 7/15/2025 2:39 PM EDT   Location:  [address] [place] [address] PCP: Nicole Caraballo DO     Attending:Madeline Lynn MD       Hospital Day: 5    Assessment and Recommendations   Cielo Howard is a 75 y.o. female who presents with Debility      # Debility 2/2 Fall  Inability to ambulate after fall. Trauma imagining non-acute. PT/OT eval for placement -recommending moderate postacute rehabilitation, consult to CM placed, pre-cert started     # Non-ischemic Cardiomyopathy   - slight LLE noted today  -20mg oral lasix today, otherwise appears euvolemic       # Non-Obs CAD  -Continue aspirin, statin, beta-blocker     # T2DM  - LCSI  - Hold PO meds   - Hypoglycemic protocol      # HTN  -Continue home BP meds   -blood pressure trends stable     # HLD  -Continue statin      # Mood Disorder   -Continue home meds     # Asthma   -Continue home inhalers     Comment: Please note this report has been produced using speech recognition software and may contain errors related to that system including errors in grammar, punctuation, and spelling, as well as words and phrases that may be inappropriate. If there are any questions or concerns please feel free to contact the dictating provider for clarification.     Diet ADULT DIET; Regular; 4 carb choices (60 gm/meal)   DVT Prophylaxis [x] Lovenox, []  Heparin, [] SCDs, [] Ambulation,  [] Eliquis, [] Xarelto  [] Coumadin   Code Status Full Code   Disposition From: home  Expected Disposition: SNF  Estimated Date of Discharge: TBD  Patient requires continued admission due to pending placement   Surrogate Decision Maker/ POA  Jermoe/ brother     Personally reviewed point-of-care glucose trends, CBC, CMP    Subjective:     Chief Complaint: Debility    Patient seen and examined at bedside patient denies any complaints at this time      Review of 
    V2.0    [facility] [place]      Name:  Cielo Howard /Age/Sex: 1950  (75 y.o. female)   MRN & CSN:  [id number] & [id number] Encounter Date/Time: 2025 2:39 PM EDT   Location:  [address] [place] [address] PCP: Nicole Caraballo DO     Attending:Rupali York MD       Hospital Day: 4    Assessment and Recommendations   Cielo Howard is a 75 y.o. female who presents with Debility      Plan:     #Debility 2/2 Fall   - Inability to ambulate after fall. Trauma imagining non-acute.   PT/OT eval for placement -recommending moderate postacute rehabilitation  -consult to CM placed, pre-cert started     #Non-ischemic Cardiomyopathy   - slight LLE noted today  -20mg oral lasix today, otherwise appears euvolemic     #Leukocytosis  -refusing labs today despite education of risk of worsening hyponatremia and leukocytosis    #Non-Obs CAD  -Continue aspirin, statin, beta-blocker     #T2DM  - LCSI  - Hold PO meds   - Hypoglycemic protocol      #HTN  -Continue home BP meds   -blood pressure trends stable     #HLD  -Continue statin      # Mood Disorder   -Continue home meds     #Asthma   -Continue home inhalers       Diet ADULT DIET; Regular   DVT Prophylaxis [x] Lovenox, []  Heparin, [] SCDs, [] Ambulation,  [] Eliquis, [] Xarelto  [] Coumadin   Code Status Full Code   Disposition From: home  Expected Disposition: TBD  Estimated Date of Discharge: TBD  Patient requires continued admission due to above medical conditions   Surrogate Decision Maker/ POA  Jerome/ brother     Personally reviewed point-of-care glucose trends, CBC, CMP    Discussed management of the case with  who was available for questions and was in agreement with my plan of care and assessment.        Subjective:     Chief Complaint:     Cielo Howard is a 75 y.o. female whom was examined at bedside.  S she is noted to be sitting up in her her chair.  Patient endorses that she has no complaints.  Patient informed me she did allow lab to come.went over lab results 
    V2.0    [facility] [place] Progress Note      Name:  Cielo Howard /Age/Sex: 1950  (75 y.o. female)   MRN & CSN:  [id number] & [id number] Encounter Date/Time: 2025 2:39 PM EDT   Location:  [address] [place], [place] [address] PCP: Nicole Caraballo DO     Attending:Rupali York MD       Hospital Day: 2    Assessment and Recommendations   Cielo Howard is a 75 y.o. female who presents with Debility      Plan:     #Debility 2/2 Fall   - Inability to ambulate after fall. Trauma imagining non-acute.   PT/OT eval for placement -recommending moderate postacute rehabilitation     #Non-ischemic Cardiomyopathy   - Euvolemic on exam      #Non-Obs CAD  -Continue aspirin, statin, beta-blocker     #T2DM  - LCSI  - Hold PO meds   - Hypoglycemic protocol      #HTN  -Continue home BP meds   -blood pressure trends stable     HLD  -Continue statin       Mood Disorder   -Continue home meds     Asthma   -Continue home inhalers       Diet ADULT DIET; Regular   DVT Prophylaxis [x] Lovenox, []  Heparin, [] SCDs, [] Ambulation,  [] Eliquis, [] Xarelto  [] Coumadin   Code Status Full Code   Disposition From: home  Expected Disposition: TBD  Estimated Date of Discharge: TBD  Patient requires continued admission due to above medical conditions   Surrogate Decision Maker/ POA  Jerome/ brother     Personally reviewed point-of-care glucose trends, CBC, PT/INR, CMP, CBC    Discussed management of the case with  who was available for questions and was in agreement with my plan of care and assessment.        Subjective:     Chief Complaint:     Cielo Howard is a 75 y.o. female whom was examined at bedside.  She is noted to be laying in bed.  Patient reports that she fell yesterday while missing a step.  She said that she fell down her steps that are carpeted.  She reports that she did hit her head but she does not believe that she lost consciousness.  Currently she is still having some head and neck pain but it is better since coming into the hospital..  Patient 
    V2.0    [facility] [place]      Name:  Cielo Howard /Age/Sex: 1950  (75 y.o. female)   MRN & CSN:  [id number] & [id number] Encounter Date/Time: 2025 2:39 PM EDT   Location:  [address] [place] [address] PCP: Nicole Caraballo DO     Attending:Rupali York MD       Hospital Day: 3    Assessment and Recommendations   Cielo Howard is a 75 y.o. female who presents with Debility      Plan:     #Debility 2/2 Fall   - Inability to ambulate after fall. Trauma imagining non-acute.   PT/OT eval for placement -recommending moderate postacute rehabilitation  -consult to CM placed     #Non-ischemic Cardiomyopathy   - Euvolemic on exam      #Leukocytosis  -refusing labs today despite education of risk of worsening hyponatremia and leukocytosis    #Non-Obs CAD  -Continue aspirin, statin, beta-blocker     #T2DM  - LCSI  - Hold PO meds   - Hypoglycemic protocol      #HTN  -Continue home BP meds   -blood pressure trends stable     HLD  -Continue statin       Mood Disorder   -Continue home meds     Asthma   -Continue home inhalers       Diet ADULT DIET; Regular   DVT Prophylaxis [x] Lovenox, []  Heparin, [] SCDs, [] Ambulation,  [] Eliquis, [] Xarelto  [] Coumadin   Code Status Full Code   Disposition From: home  Expected Disposition: TBD  Estimated Date of Discharge: TBD  Patient requires continued admission due to above medical conditions   Surrogate Decision Maker/ POA  Jerome/ brother     Personally reviewed point-of-care glucose trends    Discussed management of the case with  who was available for questions and was in agreement with my plan of care and assessment.        Subjective:     Chief Complaint:     Cielo Howard is a 75 y.o. female whom was examined at bedside.  S she is noted to be sitting up in her her chair.  Patient endorses that she has no complaints.  She also at this point is refusing any labs drawn at this time.  She stated that she is tired of being a pin cushion and no longer wants to have lab draws.  
  Physical Therapy Treatment Note  Name: Cielo Howard MRN: [id number] :   1950   Date:  2025   Admission Date: 2025 Room:  [address] [place], [place] [address]   Restrictions/Precautions: general precautions, falls   Communication with other providers:  Pt okay to see for therapy per RN   Subjective:  Patient states:  Agreeable to session, motivated.   Pain:   Location, Type, Intensity (0/10 to 10/10):  Endorses pain at upper back.   Objective:    Observation:  Pt sitting up in recliner upon PTA arrival.   Objective Measures:  Tele, stable  Treatment, including education/measures:    Therapeutic Activity Training:   Therapeutic activity training was instructed today.  Cues were given for safety, sequence, UE/LE placement, awareness, and balance. Activities performed today included STS.     Mobility:  STS: CGA from recliner, CGA from commode.     Gait:  Gait training conducted for ~200ft with RW and CGA for safety. Pt demos decreased step length, decreased gait speed, decreased heel strike, mild postural sway, decreased foot clearance. PTA provided cues for upright gaze and posture and for increased foot clearance.     Exercises:  Pt performed seated APs, marching 1 x 20 ea. And pt requested conclusion dt arrival of lunch.     Safety:   Gait belt donned this session. Pt returned safely to recliner with chair alarm activated, call light in reach, all needs met.   Assessment / Impression:    Pt tolerated OOB activities well this date but does present with impaired strength and endurance and will continue to benefit from skilled therapy to address the same.   Patient's tolerance of treatment:  Good   Adverse Reaction: none  Significant change in status and impact:  none  Barriers to improvement:  raúl  Plan for Next Session:    Plan to continue OOB activities.   Time in:  1150  Time out:  1213  Timed treatment minutes:  23  Total treatment time:  23    Previously filed items:  Social/Functional History  Lives With: 
  Physician Progress Note      PATIENT:               ISAIAS PULLIAM  [facility] #:                  [id number]  :                       1950  ADMIT DATE:       2025 9:03 PM  DISCH DATE:  RESPONDING  PROVIDER #:        Melvi Sandoval          QUERY TEXT:    Based on your medical judgment, please clarify these findings and document if   any of the following are being evaluated and/or treated:    The clinical indicators include:  Age 75, Unsteady on feet, frequent falls    ED note: Pt came to the ED after falling down approx 8 stairs at her   apartment. CT's were all negative, but pt is needing placement as she is not   able to care for herself.She is able to ambulate short distances with   assistance, and has been hooked up to a purewick while here. Patient is   failing ambulation trial here and does require climbing an entire flight of   steps to enter her apartment and is not suitable for home-going.  Case   management is consulted and they are recommending admission for placement and   possible inpatient rehab which patient is amenable to.    PN ; #Debility 2/2 Fall  - Inability to ambulate after fall. Trauma imagining non-acute.  PT/OT eval for placement -recommending moderate postacute rehabilitation    PN :My assessment and plan reveals fall secondary to physical   deconditioning, PT OT was consulted who recommend SNF patient agreeable to go   to skilled nursing facility, medically stable    PT/OT, Care coordinator for ECF placement, moderate postacute rehabilitation  Options provided:  -- Falls due to Age Related Physical Debility  -- Other - I will add my own diagnosis  -- Disagree - Not applicable / Not valid  -- Disagree - Clinically unable to determine / Unknown  -- Refer to Clinical Documentation Reviewer    PROVIDER RESPONSE TEXT:    This patient has falls due to age related physical debility.    Query created by: Renata Loo on 2025 12:09 PM      Electronically signed by:  Melvi MILLER 
4 Eyes Skin Assessment     NAME:  Cielo Howard  YOB: 1950  MEDICAL RECORD NUMBER:  [id number]    The patient is being assessed for  Admission    I agree that at least one RN has performed a thorough Head to Toe Skin Assessment on the patient. ALL assessment sites listed below have been assessed.      Areas assessed by both nurses:    Head, Face, Ears, Shoulders, Back, Chest, Arms, Elbows, Hands, Sacrum. Buttock, Coccyx, Ischium, Legs. Feet and Heels, and Under Medical Devices         Does the Patient have a Wound? No noted wound(s)       David Prevention initiated by RN: No  Wound Care Orders initiated by RN: No    Pressure Injury (Stage 1,2,3,4, Unstageable, DTI, NWPT, and Complex wounds) if present, place Wound referral order by RN under : No    New Ostomies, if present place, Ostomy referral order under : No     Nurse 1 eSignature: Electronically signed by Gabino Travis RN on 7/12/25 at 3:40 AM EDT    **SHARE this note so that the co-signing nurse can place an eSignature**    Nurse 2 eSignature: Electronically signed by Lavelle Ho RN on 7/12/25 at 4:36 AM EDT   
Called for [facility] and left information for call back to discuss about the case, awaiting call from insurance company.    Got a Call Back from Medical Director Dr Alvarenga states as navi is walking 10+50+75 feet he declined the SNF and stated we have an option to do an expedited appeal.  
Occupational Therapy    Occupational Therapy Treatment Note    Name: Cielo Howard MRN: [id number] :   1950   Date:  2025   Admission Date: 2025 Room:  [address] [place] [address]       Restrictions/Precautions:          fall risk, general precautions      Subjective:  Patient states:  \"I'm doing better each day\"  Pain:   reports back pain, did not rate    Objective:    Observation: seated in recliner, agreeable   Objective Measures:  vitals stable on room air    Treatment, including education:  Self Care Training:   Cues were given for safety, sequence, UE/LE placement, visual cues, and balance.    Activities performed today included grooming    SBA standing at the sink to wash hands, brush teeth + mouthwash, and brush hair. Extra time to complete for thoroughness.    Therapeutic Activity Training:   Therapeutic activity training was instructed today.  Cues were given for safety, sequence, UE/LE placement, awareness, and balance.    Activities performed today included sit-stand, ambulation.    STS to/from recliner with SBA, Vcs for hand placement and initiation.   Ambulated to/from BR + functional household distance using RW with CGA - SBA, Vcs for pathway and RW mgmt. Standing RB in between bouts of ambulation d/t fatigue, limited in overall distance d/t fatigue and SOB.     Left seated in recliner with all needs met, alarm on.     Assessment / Impression:    Patient's tolerance of treatment: Well  Adverse Reaction: None  Significant change in status and impact: none  Barriers to improvement: weakness, activity tolerance, safety      Plan for Next Session:    Continue w/ OT POC and functional goals, address safety/independence w/ ADLs and transfers/mobility.       Time in:  1512  Time out:  1538  Timed treatment minutes:  26  Total treatment time:  26      Electronically signed by:      Tiffany Harvey OT          
Occupational Therapy    Occupational Therapy Treatment Note  Name: Cielo Howard MRN: [id number] :   1950   Date:  2025   Admission Date: 2025 Room:  [address] [place] [address]-A     Communication with other providers:  RN approved session.     Subjective:  Patient states:  \"I need to go over a few things\"  Pain: Denies pain   Restrictions: general and fall  No one at bedside    Objective:    Observation:  pt was seated in chair upon arrival, agreeable to session  Objective Measures:  Pt alert and oriented    Treatment, including education:  Self Care Training:   Self care training was performed today.  Cues were given for safety, sequence, UE/LE placement, visual cues, and balance.      Pt seated in chair upon arrival and completed lower body dressing with dressing stick to doff socks with set up and SBA. Pt used reacher to grab socks. Pt completed donning socks with use of socks with demonstration provided and SBA with increased time.     Therapeutic Activity Training:   Therapeutic activity training was instructed today.  Cues were given for safety, sequence, UE/LE placement, visual cues, and balance.      Pt completed sit to stand from armed chair with WW.  Pt completed functional mobility with WW and CGA less than household distance with 1 standing rest break with verbal cues for pursed lip breathing. Pt returned in room and seated in chair and educated on stairs in apartment to do laundry and to get into apartment. Pt educated on energy conservation techniques with laundry and grocery task with WW. Pt left seated in chair.     Education: Role of OT, OT POC, safety, benefits of EOB/OOB activity, rationale for treatment  Safety Measures: Gait belt used for safety of pt and therapist, Left in seated inc chair. , Alarm in place, call light and phone within reach, lines managed    Assessment / Impression:    Patient's tolerance of treatment: good   Adverse Reaction: none  Significant change in status and impact:  
Occupational Therapy    Occupational Therapy Treatment Note  Name: Cielo Howard MRN: [id number] :   1950   Date:  7/15/2025   Admission Date: 2025 Room:  [address] [place] [address]-A     Communication with other providers:  RN approved session.     Subjective:  Patient states:  \"Let's get walking\"  Pain: denies pain  Restrictions: general and fall  No one at bedside    Objective:    Observation:  pt was seated in chair upon arrival, agreeable to session  Objective Measures:  Pt alert and oriented    Treatment, including education:  Therapeutic Activity Training:   Therapeutic activity training was instructed today.  Cues were given for safety, sequence, UE/LE placement, visual cues, and balance.       Pt seated in chair upon arrival. Pt completed sit to stand from armed chair with CGA and completed functional WW and less than household distance with verbal cues for safety awareness. Pt returned in room and seated in chair.     Education: Role of OT, OT POC, safety, benefits of EOB/OOB activity, rationale for treatment  Safety Measures: Gait belt used for safety of pt and therapist, Left in seated in chair, Alarm in place, call light and phone within reach, lines managed    Assessment / Impression:    Patient's tolerance of treatment: good  Adverse Reaction: none  Significant change in status and impact:  none  Barriers to improvement: none    Plan for Next Session:    Continue OT POC    Time in: 1310  Time out:1329    Timed treatment minutes: 19   Total treatment time: 19    Electronically signed by:       CARMEN Velazquez      
Occupational Therapy  [facility] ACUTE CARE OCCUPATIONAL THERAPY EVALUATION    Cielo Howard, 1950, 3018/3018-A, 7/12/2025    Discharge Recommendation: Facility for moderate post-acute rehabilitation, anticipate 1-2 hours per day and 5 days per week.      History:  [place]:  The primary encounter diagnosis was Fall, initial encounter. Diagnoses of Closed head injury, initial encounter and Unsteady gait were also pertinent to this visit.  Past Medical History:   Diagnosis Date    Anemia     Arthritis     Right Hip    Asthma     Cardiomyopathy, nonischemic ([facility])     hypertensive type    COPD (chronic obstructive pulmonary disease) ([facility]) 2014    per PFT 2014    Edema     Essential hypertension     History of blood transfusion     Hyperlipidemia     Labial cyst     Major depressive disorder, single episode, unspecified     Mitral valve regurgitation     Obesity     Obstructive sleep apnea on CPAP     Osteopenia     PMB (postmenopausal bleeding)     Reflux esophagitis     Type 2 diabetes mellitus without complication ([facility])     Vaginal discharge        Subjective:  Patient states: \"I have to persevere if I want to go home\"  Pain:  reports 5/10 back pain  Communication with other providers: co-eval with PT, handoff to RN  Restrictions: General Precautions, Fall Risk    Home Setup/Prior level of function:  Social/Functional History  Lives With: Alone  Type of Home: Apartment  Home Layout: One level  Home Access: Stairs to enter with rails  Entrance Stairs - Number of Steps: 8  Bathroom Shower/Tub: Tub/Shower unit  Bathroom Equipment: Shower chair  Home Equipment: Cane - Quad, Walker - Standard  Has the patient had two or more falls in the past year or any fall with injury in the past year?: No (One related to admission)  Receives Help From: Neighbor, Home health ( 3x/week)  Prior Level of Assist for ADLs: Independent  Prior Level of Assist for Homemaking: Needs assistance  Prior Level of Assist for 
Pacemaker interrogated.    Reviewed with electrophysiology.    Normal function with occasional PACs noted.    No urgent cardiac workup needed at this time.    Maxwell Evans PA-C 07/16/25 11:17 AM    
Patient has no IV access and refusing new PIV placement. Attending aware.  
Physical Therapy    Physical Therapy Treatment Note  Name: Cielo Howard MRN: [id number] :   1950   Date:  2025   Admission Date: 2025 Room:  [address] [place] [address]   Restrictions/Precautions:  general precautions, falls  Communication with other providers:  RN approval for PT session  Subjective:  Patient states: \"That movie had something crawling out of the sink!\"  Pain:   Location, Type, Intensity (0/10 to 10/10):  reports soreness in tailbone at rest, does not provide numerical rating. Provided pt with pressure relief cushion in recliner  Objective:    Observation:  Seated in recliner upon arrival, agreeable to PT  Objective Measures:  stable vitals on room air, pt reports mild SOB with ambulation    Treatment, including education/measures:  Sit to stand: from recliner (2x) and from toilet (1x) to standing at RW, SBA for safety  Stand to sit: from standing at RW to seated on toilet (1x) and to recliner (2x), SBA for safety    Standing balance: Pt completed light dynamic activity while managing depends in standing with single UE support, SBA for safety    Gait: completed 10ft + 50ft + 75ft of gait training with RW, CGA to SBA for safety. Pt continues to demonstrate decreased pace with decreased step length and height bilaterally. Pt required two standing breaks between bouts due to fatigue and SOB. Pt still feels too unsteady to use device less supportive than RW.     Therapeutic exercise: Pt completed the following exercises seated in the recliner with verbal cues and demonstration for correct form. Pt completed x10 reps of ea exercise on BLEs  LAQs  Heel raises  Toe raises  Alternating marches    Educated pt on PT POC, role of PT, importance of OOB mobility, progress towards goals    Safety: Pt left in recliner with alarm, call light within reach, all needs met, gait belt used    Assessment / Impression:    Patient's tolerance of treatment:  Good   Adverse Reaction: none  Significant change in status 
    Hepatic:   Recent Labs     07/14/25  0514   AST 14*   ALT 18   BILITOT 0.3   ALKPHOS 93     Lipids:   Lab Results   Component Value Date/Time    CHOL 176 04/10/2025 09:24 AM    HDL 45 04/10/2025 09:24 AM    TRIG 270 04/10/2025 09:24 AM     Hemoglobin A1C:   Lab Results   Component Value Date/Time    LABA1C 6.3 07/14/2025 05:14 AM     TSH: No results found for: \"TSH\"  Troponin: No results found for: \"TROPONINT\"  Lactic Acid: No results for input(s): \"LACTA\" in the last 72 hours.  BNP: No results for input(s): \"PROBNP\" in the last 72 hours.  UA:  Lab Results   Component Value Date/Time    NITRU NEGATIVE 07/02/2018 09:00 AM    COLORU YELLOW 07/02/2018 09:00 AM    PHUR 6.0 11/14/2023 03:19 PM    WBCUA 9 07/02/2018 09:00 AM    RBCUA <1 07/02/2018 09:00 AM    MUCUS RARE 07/02/2018 09:00 AM    TRICHOMONAS NONE SEEN 07/02/2018 09:00 AM    BACTERIA RARE 07/02/2018 09:00 AM    CLARITYU HAZY 07/02/2018 09:00 AM    SPECGRAV 1.010 11/14/2023 03:19 PM    LEUKOCYTESUR Negative 11/14/2023 03:19 PM    LEUKOCYTESUR LARGE 07/02/2018 09:00 AM    UROBILINOGEN NORMAL 07/02/2018 09:00 AM    BILIRUBINUR Negative 11/14/2023 03:19 PM    BLOODU Negative 11/14/2023 03:19 PM    BLOODU NEGATIVE 07/02/2018 09:00 AM    GLUCOSEU 500 mg/dL 11/14/2023 03:19 PM    KETUA Negative 11/14/2023 03:19 PM    KETUA NEGATIVE 07/02/2018 09:00 AM     Urine Cultures:   Lab Results   Component Value Date/Time    LABURIN No growth at 18 to 36 hours 11/14/2023 03:43 PM     Blood Cultures: No results found for: \"BC\"  No results found for: \"BLOODCULT2\"  Organism: No results found for: \"ORG\"      Electronically signed by Madeline Lynn MD on 7/16/2025 at 11:56 AM

## 2025-07-17 NOTE — CARE COORDINATION
1248 Nancy DURAN gave ok for hhc from Dr Loyd office/Sams[facility].  Rakel saw pt and reviewed demo info and she also stated that she told pt to f/u w/MD office for a visit so hhc can be continued.

## 2025-07-17 NOTE — CARE COORDINATION
LSW spoke with pt and informed her that her insurance has denied for her to go to [facility].  LSW explained that the doctor has called the insurance to to complete a peer to peer.  Doctor waiting for insurance to return his call.   LSW spoke with pt regarding if after the P2P if completed and if insurance uphold the denial.  CM can try to do a expedited appeal.  Pt stated understanding. LSW spoke with pt regarding her plan is the insurance continues to deny SNF.  Pt stated she is active with [facility] and likely go home and resume services with [facility].  LSW is following.

## 2025-07-17 NOTE — PLAN OF CARE
Problem: Discharge Planning  Goal: Discharge to home or other facility with appropriate resources  7/16/2025 2235 by Gabino Travis RN  Outcome: Progressing     Problem: Safety - Adult  Goal: Free from fall injury  7/16/2025 2235 by Gabino Travis, RN  Outcome: Progressing     Problem: Chronic Conditions and Co-morbidities  Goal: Patient's chronic conditions and co-morbidity symptoms are monitored and maintained or improved  7/16/2025 2235 by Gabino Travis, RN  Outcome: Progressing

## 2025-07-17 NOTE — PLAN OF CARE
Problem: Chronic Conditions and Co-morbidities  Goal: Patient's chronic conditions and co-morbidity symptoms are monitored and maintained or improved  7/17/2025 1035 by Pastor Feliciano RN  Outcome: Progressing  7/16/2025 2235 by Gabino Travis RN  Outcome: Progressing     Problem: Discharge Planning  Goal: Discharge to home or other facility with appropriate resources  7/17/2025 1035 by Pastor Feliciano RN  Outcome: Progressing  7/16/2025 2235 by Gabino Travis RN  Outcome: Progressing     Problem: Pain  Goal: Verbalizes/displays adequate comfort level or baseline comfort level  7/17/2025 1035 by Pastor Feliciano RN  Outcome: Progressing  7/16/2025 2235 by Gabino Travis RN  Outcome: Progressing     Problem: ABCDS Injury Assessment  Goal: Absence of physical injury  7/17/2025 1035 by Pastor Feliciano RN  Outcome: Progressing  7/16/2025 2235 by Gabino Travis RN  Outcome: Progressing     Problem: Safety - Adult  Goal: Free from fall injury  7/17/2025 1035 by Pastor Feliciano RN  Outcome: Progressing  7/16/2025 2235 by Gabino Travis RN  Outcome: Progressing

## 2025-07-17 NOTE — DISCHARGE SUMMARY
V2.0  Discharge Summary    Name:  Cielo Howard /Age/Sex: 1950 (75 y.o. female)   Admit Date: 2025  Discharge Date: 25    MRN & CSN:  [id number] & [id number] Encounter Date and Time 25 10:19 AM EDT    Attending:  Madeline Lynn MD Discharging Provider: Madeline Lynn MD       Hospital Course:     Brief HPI: Cielo Howard is a 75 y.o. female who presented with debility    Brief Problem Based Course:   # Debility status post fall: Presented with inability to ambulate after fall, trauma imaging nonacute, PT/OT evaluated and recommended SNF, SNF declined, did peer to peer also declined again stating that patient is walking about 75 feet but patient lives alone at home and patient after discussing want to go home with home health care so discharge patient in stable condition to follow-up with PCP.      The patient expressed appropriate understanding of, and agreement with the discharge recommendations, medications, and plan.     Consults this admission:  IP CONSULT TO CASE MANAGEMENT  IP CONSULT TO CARDIOLOGY  IP CONSULT TO HOME CARE NEEDS    Discharge Diagnosis:   Debility      Discharge Instruction:   Follow up appointments: PCP  Primary care physician: Nicole Caraballo DO within 1 week  Diet: diabetic diet   Activity: activity as tolerated  Disposition: Discharged to:   []Home, [x]C, []SNF, []Acute Rehab, []Hospice   Condition on discharge: Stable  Labs and Tests to be Followed up as an outpatient by PCP or Specialist:     Discharge Medications:        Medication List        CONTINUE taking these medications      albuterol sulfate  (90 Base) MCG/ACT inhaler  Commonly known as: Ventolin HFA  Inhale 2 puffs into the lungs every 6 hours as needed for Wheezing     Calcium Carb-Cholecalciferol 600-800 MG-UNIT Chew     empagliflozin 25 MG tablet  Commonly known as: Jardiance  Take 1 tablet by mouth daily     escitalopram 10 MG tablet  Commonly known as: LEXAPRO  Take 1 tablet

## 2025-07-17 NOTE — CARE COORDINATION
Doctor completed the peer to peer and the insurance upheld the denial.  Doctor spoke with pt about trying an expedited appeal and pt declined and stated she wants to go home and resume her HC with [facility].      LSW called [facility] and the number that LSW has for [facility] has been disconnected.  LSW spoke with pt and asked if she has a number for the HC.  Pt stated she does not want [facility] any longer.  She said the RN that comes to her home can hardly go up the steps.  LSW informed pt that if she no longer wants HC services with [facility] she has to call them and discontinue services with that HC.  Pt reached for her phone and called them and cancel their services.  LSW spoke with pt regarding HC agencies in the area.  Pt requested [facility].  LSW sent a referral through [facility] and also sent a PS to [facility]

## 2025-07-18 ENCOUNTER — CARE COORDINATION (OUTPATIENT)
Dept: CASE MANAGEMENT | Age: 75
End: 2025-07-18

## 2025-07-18 DIAGNOSIS — R53.81 DEBILITY: Primary | ICD-10-CM

## 2025-07-18 PROCEDURE — 1111F DSCHRG MED/CURRENT MED MERGE: CPT | Performed by: FAMILY MEDICINE

## 2025-07-18 NOTE — CARE COORDINATION
Transitions Interventions     Other Services: Declined (Comment: rpm)      Transportation Support: Declined    Meals on Wheels: Completed  DME Assistance: Declined     Senior Services: Completed     Medication Assistance Program: Declined       Social Work: Declined              Follow Up Appointment:   Discussed follow up appointments. Patient has hospital follow up appointment scheduled Message routed to PCP   Future Appointments         Provider Specialty Dept Phone    11/3/2025 2:00 PM Lpn, [facility] Internal Medicine [phone]            Care Transition Nurse provided contact information.  Plan for follow-up call in 2-5 days based on severity of symptoms and risk factors.  Plan for next call: falls, PCP , [facility]     Shanae Fischer RN

## 2025-07-21 ENCOUNTER — TELEPHONE (OUTPATIENT)
Dept: INTERNAL MEDICINE CLINIC | Age: 75
End: 2025-07-21

## 2025-07-21 NOTE — TELEPHONE ENCOUNTER
Care Transitions Initial Follow Up Call    Outreach made within 2 business days of discharge: Yes    Patient: Cielo Howard Patient : 1950   MRN: [id number]  Reason for Admission: 25  Discharge Date: 25       Spoke with: dayo on secured vm-instructed to call the office    Discharge department/facility: [facility]    TCM Interactive Patient Contact:  Was patient able to fill all prescriptions: Yes  Was patient instructed to bring all medications to the follow-up visit: Yes  Is patient taking all medications as directed in the discharge summary? Yes  Does patient understand their discharge instructions: Yes  Does patient have questions or concerns that need addressed prior to 7-14 day follow up office visit: no    Additional needs identified to be addressed with provider  No needs identified             Scheduled appointment with PCP within 7-14 days    Follow Up  Future Appointments   Date Time Provider Department Center   11/3/2025  2:00 PM Lpn, [facility]       Maite Guadalupe LPN

## 2025-07-23 ENCOUNTER — HOSPITAL ENCOUNTER (OUTPATIENT)
Age: 75
Discharge: HOME | End: 2025-07-23
Payer: MEDICARE

## 2025-07-23 ENCOUNTER — CARE COORDINATION (OUTPATIENT)
Dept: CASE MANAGEMENT | Age: 75
End: 2025-07-23

## 2025-07-23 DIAGNOSIS — E11.65 TYPE 2 DIABETES MELLITUS WITH HYPERGLYCEMIA, WITH LONG-TERM CURRENT USE OF INSULIN (HCC): ICD-10-CM

## 2025-07-23 DIAGNOSIS — Z79.4 TYPE 2 DIABETES MELLITUS WITH HYPERGLYCEMIA, WITH LONG-TERM CURRENT USE OF INSULIN (HCC): ICD-10-CM

## 2025-07-23 LAB
CREAT UR-MCNC: 110 MG/DL (ref 28–217)
EST. AVERAGE GLUCOSE BLD GHB EST-MCNC: 142 MG/DL
HBA1C MFR BLD: 6.6 % (ref 4.2–6.3)
MICROALBUMIN UR-MCNC: 25 MG/L (ref 0–20)
MICROALBUMIN/CREAT UR-RTO: 23 MCG/MG CREAT

## 2025-07-23 PROCEDURE — 83036 HEMOGLOBIN GLYCOSYLATED A1C: CPT

## 2025-07-23 PROCEDURE — 82043 UR ALBUMIN QUANTITATIVE: CPT

## 2025-07-23 PROCEDURE — 82570 ASSAY OF URINE CREATININE: CPT

## 2025-07-23 NOTE — CARE COORDINATION
Care Transitions Note    Follow Up Call     Patient Current Location:  Home: [address] [place] [address]  [address]  [facility] [address]    Care Transition Nurse contacted the patient by telephone. Verified name and  as identifiers.    Additional needs identified to be addressed with provider   No needs identified                 Method of communication with provider: none.    Care Summary Note: Pt denies increase SOB, falls or LOB, dizziness/lightheadedness, CP, palpitations, NVD or increased weakness /fatigue. Pt active with [facility]. Denies med changes since last call. Pt had ordered lab work obtained this am.Pt appetite and fluid intake are good. Pt obtained weight this .5#. Pt aware when to call MD/91Laura and is agreeable to additional calls    Plan of care updates since last contact:  Review of patient management of conditions/medications: see above       Advance Care Planning:   Does patient have an Advance Directive: reviewed and current.    Medication Review:  No changes since last call.     Remote Patient Monitoring:  Offered patient enrollment in the Remote Patient Monitoring (RPM) program for in-home monitoring: Yes, but did not enroll at this time: already monitoring with home equipment.    Assessments:  Care Transitions Subsequent and Final Call    Schedule Follow Up Appointment with PCP: Completed  Subsequent and Final Calls  Do you have any ongoing symptoms?: No  Have your medications changed?: No  Do you have any questions related to your medications?: No  Do you currently have any active services?: Yes  Are you currently active with any services?: Home Health  Do you have any needs or concerns that I can assist you with?: No  Identified Barriers: Other  Care Transitions Interventions     Other Services: Declined (Comment: rpm)      Transportation Support: Declined    Meals on Wheels: Completed  DME Assistance: Declined     Senior Services: Completed     Medication Assistance Program: Declined

## 2025-07-29 ENCOUNTER — CARE COORDINATION (OUTPATIENT)
Dept: CASE MANAGEMENT | Age: 75
End: 2025-07-29

## 2025-07-29 NOTE — CARE COORDINATION
Care Transitions Note    Follow Up Call     Attempted to reach patient for transitions of care follow up.  Unable to reach patient.      Outreach Attempts:   HIPAA compliant voicemail left for patient.     Care Summary Note: UTR    Follow Up Appointment:   Future Appointments         Provider Specialty Dept Phone    7/30/2025 11:20 AM Nicole Caraballo DO Internal Medicine [phone]    11/3/2025 2:00 PM Lpn, [facility] Internal Medicine [phone]            Plan for follow-up on next business day.  based on severity of symptoms and risk factors. Plan for next call: ISAIAH Fischer RN

## 2025-07-30 ENCOUNTER — OFFICE VISIT (OUTPATIENT)
Dept: INTERNAL MEDICINE CLINIC | Age: 75
End: 2025-07-30

## 2025-07-30 ENCOUNTER — CARE COORDINATION (OUTPATIENT)
Dept: CASE MANAGEMENT | Age: 75
End: 2025-07-30

## 2025-07-30 VITALS
HEART RATE: 84 BPM | BODY MASS INDEX: 47.26 KG/M2 | OXYGEN SATURATION: 97 % | SYSTOLIC BLOOD PRESSURE: 124 MMHG | DIASTOLIC BLOOD PRESSURE: 80 MMHG | WEIGHT: 242 LBS

## 2025-07-30 DIAGNOSIS — Z09 HOSPITAL DISCHARGE FOLLOW-UP: ICD-10-CM

## 2025-07-30 DIAGNOSIS — F33.0 MILD EPISODE OF RECURRENT MAJOR DEPRESSIVE DISORDER: ICD-10-CM

## 2025-07-30 DIAGNOSIS — Z79.4 TYPE 2 DIABETES MELLITUS WITH HYPERGLYCEMIA, WITH LONG-TERM CURRENT USE OF INSULIN (HCC): ICD-10-CM

## 2025-07-30 DIAGNOSIS — E78.5 HYPERLIPIDEMIA, UNSPECIFIED HYPERLIPIDEMIA TYPE: ICD-10-CM

## 2025-07-30 DIAGNOSIS — E11.65 TYPE 2 DIABETES MELLITUS WITH HYPERGLYCEMIA, WITH LONG-TERM CURRENT USE OF INSULIN (HCC): ICD-10-CM

## 2025-07-30 DIAGNOSIS — R53.81 DEBILITY: ICD-10-CM

## 2025-07-30 DIAGNOSIS — I42.8 NONISCHEMIC CARDIOMYOPATHY (HCC): ICD-10-CM

## 2025-07-30 DIAGNOSIS — J43.2 CENTRILOBULAR EMPHYSEMA (HCC): ICD-10-CM
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
## 2025-07-30 RX ORDER — SIMVASTATIN 20 MG
20 TABLET ORAL EVERY EVENING
Qty: 90 TABLET | Refills: 1 | Status: SHIPPED | OUTPATIENT
Start: 2025-07-30

## 2025-07-30 RX ORDER — ESCITALOPRAM OXALATE 10 MG/1
10 TABLET ORAL DAILY
Qty: 90 TABLET | Refills: 1 | Status: SHIPPED | OUTPATIENT
Start: 2025-07-30

## 2025-07-30 ASSESSMENT — PATIENT HEALTH QUESTIONNAIRE - PHQ9
6. FEELING BAD ABOUT YOURSELF - OR THAT YOU ARE A FAILURE OR HAVE LET YOURSELF OR YOUR FAMILY DOWN: NOT AT ALL
7. TROUBLE CONCENTRATING ON THINGS, SUCH AS READING THE NEWSPAPER OR WATCHING TELEVISION: NOT AT ALL
SUM OF ALL RESPONSES TO PHQ QUESTIONS 1-9: 0
2. FEELING DOWN, DEPRESSED OR HOPELESS: NOT AT ALL
4. FEELING TIRED OR HAVING LITTLE ENERGY: NOT AT ALL
SUM OF ALL RESPONSES TO PHQ QUESTIONS 1-9: 0
3. TROUBLE FALLING OR STAYING ASLEEP: NOT AT ALL
SUM OF ALL RESPONSES TO PHQ QUESTIONS 1-9: 0
9. THOUGHTS THAT YOU WOULD BE BETTER OFF DEAD, OR OF HURTING YOURSELF: NOT AT ALL
10. IF YOU CHECKED OFF ANY PROBLEMS, HOW DIFFICULT HAVE THESE PROBLEMS MADE IT FOR YOU TO DO YOUR WORK, TAKE CARE OF THINGS AT HOME, OR GET ALONG WITH OTHER PEOPLE: NOT DIFFICULT AT ALL
8. MOVING OR SPEAKING SO SLOWLY THAT OTHER PEOPLE COULD HAVE NOTICED. OR THE OPPOSITE, BEING SO FIGETY OR RESTLESS THAT YOU HAVE BEEN MOVING AROUND A LOT MORE THAN USUAL: NOT AT ALL
1. LITTLE INTEREST OR PLEASURE IN DOING THINGS: NOT AT ALL
SUM OF ALL RESPONSES TO PHQ QUESTIONS 1-9: 0
5. POOR APPETITE OR OVEREATING: NOT AT ALL

## 2025-07-30 NOTE — PROGRESS NOTES
Medications patient taking as of now reconciled against medications ordered at time of hospital discharge: Yes    A comprehensive review of systems was negative except for what was noted in the HPI.    Lab Results   Component Value Date    WBC 6.1 07/14/2025    HGB 11.5 (L) 07/14/2025    HCT 37.2 07/14/2025    MCV 91.2 07/14/2025     07/14/2025     Lab Results   Component Value Date     07/14/2025    K 4.1 07/14/2025     07/14/2025    CO2 24 07/14/2025    BUN 13 07/14/2025    CREATININE 0.7 07/14/2025    GLUCOSE 159 (H) 07/14/2025    CALCIUM 9.5 07/14/2025    BILITOT 0.3 07/14/2025    ALKPHOS 93 07/14/2025    AST 14 (L) 07/14/2025    ALT 18 07/14/2025    LABGLOM 82 07/14/2025    GFRAA >60 08/23/2022         Hemoglobin A1C   Date Value Ref Range Status   07/23/2025 6.6 (H) 4.2 - 6.3 % Final     Microalb/Crt. Ratio 23     Lab Results   Component Value Date    CHOL 176 04/10/2025    CHOL 161 10/19/2024    CHOL 154 01/11/2023     Lab Results   Component Value Date    TRIG 270 (H) 04/10/2025    TRIG 180 (H) 10/19/2024    TRIG 139 01/11/2023     Lab Results   Component Value Date    HDL 45 04/10/2025    HDL 50 10/19/2024    HDL 45 01/11/2023     Lab Results   Component Value Date    LDL 77 04/10/2025    LDL 75 10/19/2024    LDL 81 01/11/2023 7/11/25 CT Head WO Contrast  IMPRESSION:     1.  No acute intracranial abnormality. Chronic findings  2.  There is no evidence of hemorrhage, mass lesion or acute infarction.     7/11/25 CT Cervical Spine WO Contrast  IMPRESSION:   1.  Straightening of lordosis may be due to spasm or positioning. Degenerative   findings. No acute fracture or dislocation    7/11/25 CT Chest Abdomen Pelvis W Contrrast   IMPRESSION:   1.  Chest: No evidence for acute thoracic injury  2.  Abdomen and pelvis: No evidence for acute intra-abdominal or pelvic injury     3.  Chronic findings    7/11/25 CT Lumbar/ Thoracic spine  MPRESSION:   1.  No acute fracture or malalignment in

## 2025-07-30 NOTE — CARE COORDINATION
Care Transitions Note    Follow Up Call     Patient Current Location:  Home: [address] [place] [address]  [address]  [facility] [address]    Care Transition Nurse contacted the patient by telephone. Verified name and  as identifiers.    Additional needs identified to be addressed with provider   No needs identified                 Method of communication with provider: none.    Care Summary Note: Pt had PCP appointment this AM. No med changes nad NNO. Pt reports weight today 242# . [facility] Nurse present during call. Denies falls/lob since last call. No reports of increase edema, increase sob, dizziness, increase weakness , CP ,or palpitations.Pt report being afebrile, good appetite and fluid intake. B/b remain at baseline. Pt agreeable to additional calls     Plan of care updates since last contact:  Review of patient management of conditions/medications: see above       Advance Care Planning:   Does patient have an Advance Directive: reviewed and current.    Medication Review:  No changes since last call.     Remote Patient Monitoring:  Offered patient enrollment in the Remote Patient Monitoring (RPM) program for in-home monitoring: Yes, but did not enroll at this time: already monitoring with home equipment.    Assessments:  Care Transitions Subsequent and Final Call    Schedule Follow Up Appointment with PCP: Completed  Subsequent and Final Calls  Do you have any ongoing symptoms?: No  Have your medications changed?: No  Do you have any questions related to your medications?: No  Do you currently have any active services?: Yes  Are you currently active with any services?: Home Health  Do you have any needs or concerns that I can assist you with?: No  Identified Barriers: Other, Medication Side Effects  Care Transitions Interventions     Other Services: Declined (Comment: rpm)      Transportation Support: Declined    Meals on Wheels: Completed  DME Assistance: Declined     Senior Services: Completed     Medication

## 2025-08-07 ENCOUNTER — CARE COORDINATION (OUTPATIENT)
Dept: CASE MANAGEMENT | Age: 75
End: 2025-08-07

## 2025-08-15 ENCOUNTER — CARE COORDINATION (OUTPATIENT)
Dept: CASE MANAGEMENT | Age: 75
End: 2025-08-15

## 2025-08-18 ENCOUNTER — CARE COORDINATION (OUTPATIENT)
Dept: CASE MANAGEMENT | Age: 75
End: 2025-08-18

## 2025-09-03 ENCOUNTER — OFFICE VISIT (OUTPATIENT)
Dept: FAMILY MEDICINE CLINIC | Age: 75
End: 2025-09-03
Payer: MEDICARE

## 2025-09-03 VITALS — BODY MASS INDEX: 47.81 KG/M2 | WEIGHT: 244.8 LBS | TEMPERATURE: 97.1 F | HEART RATE: 81 BPM | OXYGEN SATURATION: 96 %

## 2025-09-03 DIAGNOSIS — J01.90 ACUTE BACTERIAL SINUSITIS: ICD-10-CM

## 2025-09-03 DIAGNOSIS — B96.89 ACUTE BACTERIAL SINUSITIS: ICD-10-CM

## 2025-09-03 DIAGNOSIS — R39.9 UTI SYMPTOMS: Primary | ICD-10-CM

## 2025-09-03 LAB
BILIRUBIN, POC: ABNORMAL
BLOOD URINE, POC: ABNORMAL
CLARITY, POC: ABNORMAL
COLOR, POC: ABNORMAL
GLUCOSE URINE, POC: ABNORMAL MG/DL
KETONES, POC: ABNORMAL MG/DL
LEUKOCYTE EST, POC: ABNORMAL
NITRITE, POC: ABNORMAL
PH, POC: 7
PROTEIN, POC: ABNORMAL MG/DL
SPECIFIC GRAVITY, POC: 1.01
UROBILINOGEN, POC: ABNORMAL MG/DL

## 2025-09-03 PROCEDURE — 1123F ACP DISCUSS/DSCN MKR DOCD: CPT | Performed by: NURSE PRACTITIONER

## 2025-09-03 PROCEDURE — 99213 OFFICE O/P EST LOW 20 MIN: CPT | Performed by: NURSE PRACTITIONER

## 2025-09-03 PROCEDURE — 1160F RVW MEDS BY RX/DR IN RCRD: CPT | Performed by: NURSE PRACTITIONER

## 2025-09-03 PROCEDURE — 81002 URINALYSIS NONAUTO W/O SCOPE: CPT | Performed by: NURSE PRACTITIONER

## 2025-09-03 PROCEDURE — 1159F MED LIST DOCD IN RCRD: CPT | Performed by: NURSE PRACTITIONER

## 2025-09-03 RX ORDER — DOXYCYCLINE HYCLATE 100 MG
100 TABLET ORAL 2 TIMES DAILY
Qty: 20 TABLET | Refills: 0 | Status: SHIPPED | OUTPATIENT
Start: 2025-09-03 | End: 2025-09-13

## 2025-09-06 LAB
BACTERIA UR CULT: ABNORMAL
ORGANISM: ABNORMAL

## (undated) DEVICE — GLOVE SURG SZ 65 THK91MIL LTX FREE SYN POLYISOPRENE

## (undated) DEVICE — SET IRRIG L94IN ID0.281IN W/ 4.5IN DST FLX CONN 2 LD ON OFF

## (undated) DEVICE — GLOVE SURG SZ 65 CRM LTX FREE POLYISOPRENE POLYMER BEAD ANTI

## (undated) DEVICE — SHEET,DRAPE,UNDERBUTTOCK,GRAD POUCH,PORT: Brand: MEDLINE

## (undated) DEVICE — BAG INFUSION PRESSURE THMB WHL AIR

## (undated) DEVICE — GOWN,ISOLATION,MICROPOROUS,LV3,WHT,XL: Brand: MEDLINE

## (undated) DEVICE — MARKER SURG SKIN UTIL REGULAR/FINE 2 TIP W/ RUL AND 9 LBL

## (undated) DEVICE — PAD,NON-ADHERENT,3X8,STERILE,LF,1/PK: Brand: MEDLINE

## (undated) DEVICE — TOWEL,OR,DSP,ST,BLUE,STD,6/PK,12PK/CS: Brand: MEDLINE

## (undated) DEVICE — PREMIUM WET SKIN PREP TRAY: Brand: MEDLINE INDUSTRIES, INC.

## (undated) DEVICE — PAD MATERNITY CURITY ADH STRIP DISP

## (undated) DEVICE — MATERNITY PAD,HEAVY: Brand: CURITY

## (undated) DEVICE — TELFA NON-ADHERENT ABSORBENT DRESSING: Brand: TELFA

## (undated) DEVICE — PAD DRESSING TELFA OUCHLESS NONADH 3X8IN

## (undated) DEVICE — STERILE LATEX POWDER-FREE SURGICAL GLOVESWITH NITRILE COATING: Brand: PROTEXIS

## (undated) DEVICE — TRAY PREP DRY W/ PREM GLV 2 APPL 6 SPNG 2 UNDPD 1 OVERWRAP

## (undated) DEVICE — GLOVE ORANGE PI 8   MSG9080

## (undated) DEVICE — GAUZE,SPONGE,4"X4",16PLY,XRAY,STRL,LF: Brand: MEDLINE

## (undated) DEVICE — GOWN,SIRUS,FABRNF,RAGLAN,XL,ST,28/CS: Brand: MEDLINE

## (undated) DEVICE — GLOVE SURG SZ 65 L12IN FNGR THK79MIL GRN LTX FREE

## (undated) DEVICE — GOWN,ECLIPSE,POLYRNF,BRTHSLV,XL,30/CS: Brand: MEDLINE

## (undated) DEVICE — LEGGINGS, PAIR, CLEAR, STERILE: Brand: MEDLINE

## (undated) DEVICE — PACK,BASIC,SIRUS,V: Brand: MEDLINE

## (undated) DEVICE — CATHETER,URETHRAL,VINYL,MALE,16",16 FR: Brand: MEDLINE

## (undated) DEVICE — GLOVE SURG SZ 75 L12IN THK75MIL DK GRN LTX FREE

## (undated) DEVICE — GLOVE SURG SZ 7 CRM LTX FREE POLYISOPRENE POLYMER BEAD ANTI

## (undated) DEVICE — DRESSING TRNSPAR W6XL8IN FLM SURESITE 123